# Patient Record
Sex: FEMALE | Race: WHITE | Employment: UNEMPLOYED | URBAN - METROPOLITAN AREA
[De-identification: names, ages, dates, MRNs, and addresses within clinical notes are randomized per-mention and may not be internally consistent; named-entity substitution may affect disease eponyms.]

---

## 2017-01-03 RX ORDER — TRAMADOL HYDROCHLORIDE 50 MG/1
TABLET ORAL
Qty: 240 TABLET | Refills: 0 | Status: SHIPPED | OUTPATIENT
Start: 2017-01-03 | End: 2017-01-29 | Stop reason: SDUPTHER

## 2017-02-09 DIAGNOSIS — N39.41 URGE INCONTINENCE: ICD-10-CM

## 2017-02-10 RX ORDER — OXYBUTYNIN CHLORIDE 10 MG/1
TABLET, EXTENDED RELEASE ORAL
Qty: 30 TABLET | Refills: 11 | Status: SHIPPED | OUTPATIENT
Start: 2017-02-10 | End: 2017-06-13 | Stop reason: SDUPTHER

## 2017-03-13 ENCOUNTER — OFFICE VISIT (OUTPATIENT)
Dept: FAMILY MEDICINE CLINIC | Age: 57
End: 2017-03-13
Payer: MEDICARE

## 2017-03-13 VITALS
SYSTOLIC BLOOD PRESSURE: 130 MMHG | HEART RATE: 89 BPM | BODY MASS INDEX: 43.98 KG/M2 | DIASTOLIC BLOOD PRESSURE: 74 MMHG | WEIGHT: 285 LBS

## 2017-03-13 DIAGNOSIS — M15.9 PRIMARY OSTEOARTHRITIS INVOLVING MULTIPLE JOINTS: ICD-10-CM

## 2017-03-13 DIAGNOSIS — Z12.11 COLON CANCER SCREENING: ICD-10-CM

## 2017-03-13 DIAGNOSIS — Z23 IMMUNIZATION DUE: ICD-10-CM

## 2017-03-13 DIAGNOSIS — I10 ESSENTIAL HYPERTENSION: Primary | ICD-10-CM

## 2017-03-13 PROCEDURE — 90732 PPSV23 VACC 2 YRS+ SUBQ/IM: CPT | Performed by: FAMILY MEDICINE

## 2017-03-13 PROCEDURE — 99213 OFFICE O/P EST LOW 20 MIN: CPT | Performed by: FAMILY MEDICINE

## 2017-03-13 PROCEDURE — 90471 IMMUNIZATION ADMIN: CPT | Performed by: FAMILY MEDICINE

## 2017-03-13 RX ORDER — FLUTICASONE PROPIONATE 50 MCG
2 SPRAY, SUSPENSION (ML) NASAL DAILY
Qty: 1 BOTTLE | Refills: 5 | Status: SHIPPED | OUTPATIENT
Start: 2017-03-13 | End: 2017-06-13 | Stop reason: SDUPTHER

## 2017-03-13 RX ORDER — ESCITALOPRAM OXALATE 20 MG/1
20 TABLET ORAL DAILY
Qty: 30 TABLET | Refills: 5 | Status: SHIPPED | OUTPATIENT
Start: 2017-03-13 | End: 2017-06-13 | Stop reason: SDUPTHER

## 2017-03-13 ASSESSMENT — ENCOUNTER SYMPTOMS
BACK PAIN: 1
SORE THROAT: 0
SINUS PRESSURE: 0
SHORTNESS OF BREATH: 0
DIARRHEA: 0
NAUSEA: 0
VOMITING: 0
COUGH: 0

## 2017-04-04 RX ORDER — LISINOPRIL 20 MG/1
TABLET ORAL
Qty: 30 TABLET | Refills: 4 | Status: SHIPPED | OUTPATIENT
Start: 2017-04-04 | End: 2017-06-13 | Stop reason: SDUPTHER

## 2017-04-24 RX ORDER — TRAMADOL HYDROCHLORIDE 50 MG/1
TABLET ORAL
Qty: 240 TABLET | Refills: 1 | Status: SHIPPED | OUTPATIENT
Start: 2017-04-24 | End: 2017-06-21 | Stop reason: SDUPTHER

## 2017-06-09 ENCOUNTER — TELEPHONE (OUTPATIENT)
Dept: FAMILY MEDICINE CLINIC | Age: 57
End: 2017-06-09

## 2017-06-13 ENCOUNTER — OFFICE VISIT (OUTPATIENT)
Dept: FAMILY MEDICINE CLINIC | Age: 57
End: 2017-06-13
Payer: MEDICARE

## 2017-06-13 VITALS
BODY MASS INDEX: 45.36 KG/M2 | DIASTOLIC BLOOD PRESSURE: 80 MMHG | HEIGHT: 67 IN | SYSTOLIC BLOOD PRESSURE: 120 MMHG | WEIGHT: 289 LBS | HEART RATE: 96 BPM

## 2017-06-13 DIAGNOSIS — I10 ESSENTIAL HYPERTENSION: Primary | ICD-10-CM

## 2017-06-13 DIAGNOSIS — F32.A DEPRESSION, UNSPECIFIED DEPRESSION TYPE: ICD-10-CM

## 2017-06-13 DIAGNOSIS — N39.41 URGE INCONTINENCE: ICD-10-CM

## 2017-06-13 DIAGNOSIS — M15.9 PRIMARY OSTEOARTHRITIS INVOLVING MULTIPLE JOINTS: ICD-10-CM

## 2017-06-13 PROCEDURE — 99213 OFFICE O/P EST LOW 20 MIN: CPT | Performed by: FAMILY MEDICINE

## 2017-06-13 RX ORDER — METOPROLOL TARTRATE 50 MG/1
50 TABLET, FILM COATED ORAL 2 TIMES DAILY
Qty: 60 TABLET | Refills: 5 | Status: SHIPPED | OUTPATIENT
Start: 2017-06-13 | End: 2017-10-13 | Stop reason: SDUPTHER

## 2017-06-13 RX ORDER — BUPROPION HYDROCHLORIDE 150 MG/1
150 TABLET, EXTENDED RELEASE ORAL 2 TIMES DAILY
Qty: 60 TABLET | Refills: 3 | Status: SHIPPED | OUTPATIENT
Start: 2017-06-13 | End: 2018-04-02 | Stop reason: ALTCHOICE

## 2017-06-13 RX ORDER — TRAMADOL HYDROCHLORIDE 50 MG/1
TABLET ORAL
Qty: 240 TABLET | Refills: 1 | Status: CANCELLED | OUTPATIENT
Start: 2017-06-13

## 2017-06-13 RX ORDER — ESCITALOPRAM OXALATE 20 MG/1
20 TABLET ORAL DAILY
Qty: 30 TABLET | Refills: 5 | Status: SHIPPED | OUTPATIENT
Start: 2017-06-13 | End: 2018-04-02 | Stop reason: ALTCHOICE

## 2017-06-13 RX ORDER — OXYBUTYNIN CHLORIDE 10 MG/1
TABLET, EXTENDED RELEASE ORAL
Qty: 30 TABLET | Refills: 5 | Status: SHIPPED | OUTPATIENT
Start: 2017-06-13 | End: 2018-04-02 | Stop reason: ALTCHOICE

## 2017-06-13 RX ORDER — LISINOPRIL 20 MG/1
TABLET ORAL
Qty: 30 TABLET | Refills: 5 | Status: SHIPPED | OUTPATIENT
Start: 2017-06-13 | End: 2017-10-13 | Stop reason: SDUPTHER

## 2017-06-13 RX ORDER — RANITIDINE 150 MG/1
150 TABLET ORAL 2 TIMES DAILY
Qty: 60 TABLET | Refills: 5 | Status: SHIPPED | OUTPATIENT
Start: 2017-06-13 | End: 2019-11-08

## 2017-06-13 RX ORDER — FLUTICASONE PROPIONATE 50 MCG
2 SPRAY, SUSPENSION (ML) NASAL DAILY
Qty: 1 BOTTLE | Refills: 5 | Status: SHIPPED | OUTPATIENT
Start: 2017-06-13 | End: 2018-05-22 | Stop reason: SDUPTHER

## 2017-06-13 ASSESSMENT — PATIENT HEALTH QUESTIONNAIRE - PHQ9
9. THOUGHTS THAT YOU WOULD BE BETTER OFF DEAD, OR OF HURTING YOURSELF: 2
2. FEELING DOWN, DEPRESSED OR HOPELESS: 3
4. FEELING TIRED OR HAVING LITTLE ENERGY: 3
7. TROUBLE CONCENTRATING ON THINGS, SUCH AS READING THE NEWSPAPER OR WATCHING TELEVISION: 3
SUM OF ALL RESPONSES TO PHQ9 QUESTIONS 1 & 2: 6
1. LITTLE INTEREST OR PLEASURE IN DOING THINGS: 3
SUM OF ALL RESPONSES TO PHQ QUESTIONS 1-9: 26
8. MOVING OR SPEAKING SO SLOWLY THAT OTHER PEOPLE COULD HAVE NOTICED. OR THE OPPOSITE, BEING SO FIGETY OR RESTLESS THAT YOU HAVE BEEN MOVING AROUND A LOT MORE THAN USUAL: 3
3. TROUBLE FALLING OR STAYING ASLEEP: 3
5. POOR APPETITE OR OVEREATING: 3
6. FEELING BAD ABOUT YOURSELF - OR THAT YOU ARE A FAILURE OR HAVE LET YOURSELF OR YOUR FAMILY DOWN: 3

## 2017-06-13 ASSESSMENT — ENCOUNTER SYMPTOMS
BACK PAIN: 1
DIARRHEA: 0
VOMITING: 0
SHORTNESS OF BREATH: 0
NAUSEA: 0
SINUS PRESSURE: 0
SORE THROAT: 0
COUGH: 0

## 2017-06-19 ENCOUNTER — TELEPHONE (OUTPATIENT)
Dept: FAMILY MEDICINE CLINIC | Age: 57
End: 2017-06-19

## 2017-06-19 RX ORDER — CEPHALEXIN 500 MG/1
500 CAPSULE ORAL 3 TIMES DAILY
Qty: 30 CAPSULE | Refills: 0 | Status: SHIPPED | OUTPATIENT
Start: 2017-06-19 | End: 2017-07-12 | Stop reason: SDUPTHER

## 2017-07-12 ENCOUNTER — TELEPHONE (OUTPATIENT)
Dept: FAMILY MEDICINE CLINIC | Age: 57
End: 2017-07-12

## 2017-07-12 RX ORDER — CEPHALEXIN 500 MG/1
500 CAPSULE ORAL 3 TIMES DAILY
Qty: 30 CAPSULE | Refills: 0 | Status: SHIPPED | OUTPATIENT
Start: 2017-07-12 | End: 2017-08-18 | Stop reason: SDUPTHER

## 2017-07-19 RX ORDER — TRAMADOL HYDROCHLORIDE 50 MG/1
TABLET ORAL
Qty: 240 TABLET | Refills: 0 | Status: SHIPPED | OUTPATIENT
Start: 2017-07-19 | End: 2017-08-17 | Stop reason: SDUPTHER

## 2017-07-20 ENCOUNTER — INITIAL CONSULT (OUTPATIENT)
Dept: FAMILY MEDICINE CLINIC | Age: 57
End: 2017-07-20
Payer: MEDICARE

## 2017-07-20 DIAGNOSIS — F33.2 MAJOR DEPRESSIVE DISORDER, RECURRENT EPISODE, SEVERE WITH MIXED FEATURES (HCC): Primary | ICD-10-CM

## 2017-07-20 PROCEDURE — 90791 PSYCH DIAGNOSTIC EVALUATION: CPT | Performed by: SOCIAL WORKER

## 2017-07-20 ASSESSMENT — PATIENT HEALTH QUESTIONNAIRE - PHQ9
7. TROUBLE CONCENTRATING ON THINGS, SUCH AS READING THE NEWSPAPER OR WATCHING TELEVISION: 3
9. THOUGHTS THAT YOU WOULD BE BETTER OFF DEAD, OR OF HURTING YOURSELF: 1
2. FEELING DOWN, DEPRESSED OR HOPELESS: 3
6. FEELING BAD ABOUT YOURSELF - OR THAT YOU ARE A FAILURE OR HAVE LET YOURSELF OR YOUR FAMILY DOWN: 3
3. TROUBLE FALLING OR STAYING ASLEEP: 3
10. IF YOU CHECKED OFF ANY PROBLEMS, HOW DIFFICULT HAVE THESE PROBLEMS MADE IT FOR YOU TO DO YOUR WORK, TAKE CARE OF THINGS AT HOME, OR GET ALONG WITH OTHER PEOPLE: 2
5. POOR APPETITE OR OVEREATING: 3
8. MOVING OR SPEAKING SO SLOWLY THAT OTHER PEOPLE COULD HAVE NOTICED. OR THE OPPOSITE, BEING SO FIGETY OR RESTLESS THAT YOU HAVE BEEN MOVING AROUND A LOT MORE THAN USUAL: 0
SUM OF ALL RESPONSES TO PHQ9 QUESTIONS 1 & 2: 4
SUM OF ALL RESPONSES TO PHQ QUESTIONS 1-9: 20
4. FEELING TIRED OR HAVING LITTLE ENERGY: 3
1. LITTLE INTEREST OR PLEASURE IN DOING THINGS: 1

## 2017-07-28 ENCOUNTER — INITIAL CONSULT (OUTPATIENT)
Dept: FAMILY MEDICINE CLINIC | Age: 57
End: 2017-07-28
Payer: MEDICARE

## 2017-07-28 DIAGNOSIS — F33.1 DEPRESSION, MAJOR, RECURRENT, MODERATE (HCC): Primary | ICD-10-CM

## 2017-07-28 PROCEDURE — 90834 PSYTX W PT 45 MINUTES: CPT | Performed by: SOCIAL WORKER

## 2017-08-17 RX ORDER — TRAMADOL HYDROCHLORIDE 50 MG/1
TABLET ORAL
Qty: 240 TABLET | Refills: 0 | Status: SHIPPED | OUTPATIENT
Start: 2017-08-17 | End: 2017-09-15 | Stop reason: SDUPTHER

## 2017-08-18 ENCOUNTER — TELEPHONE (OUTPATIENT)
Dept: FAMILY MEDICINE CLINIC | Age: 57
End: 2017-08-18

## 2017-08-18 ENCOUNTER — INITIAL CONSULT (OUTPATIENT)
Dept: FAMILY MEDICINE CLINIC | Age: 57
End: 2017-08-18
Payer: MEDICARE

## 2017-08-18 DIAGNOSIS — F33.1 DEPRESSION, MAJOR, RECURRENT, MODERATE (HCC): Primary | ICD-10-CM

## 2017-08-18 PROCEDURE — 90834 PSYTX W PT 45 MINUTES: CPT | Performed by: SOCIAL WORKER

## 2017-08-18 RX ORDER — CEPHALEXIN 500 MG/1
500 CAPSULE ORAL 3 TIMES DAILY
Qty: 30 CAPSULE | Refills: 0 | Status: SHIPPED | OUTPATIENT
Start: 2017-08-18 | End: 2017-08-30 | Stop reason: SDUPTHER

## 2017-08-31 RX ORDER — CEPHALEXIN 500 MG/1
CAPSULE ORAL
Qty: 30 CAPSULE | Refills: 0 | Status: SHIPPED | OUTPATIENT
Start: 2017-08-31 | End: 2017-10-13 | Stop reason: SDUPTHER

## 2017-09-21 ENCOUNTER — TELEPHONE (OUTPATIENT)
Dept: FAMILY MEDICINE CLINIC | Age: 57
End: 2017-09-21

## 2017-09-21 RX ORDER — CEPHALEXIN 500 MG/1
500 CAPSULE ORAL 3 TIMES DAILY
Qty: 30 CAPSULE | Refills: 0 | Status: SHIPPED | OUTPATIENT
Start: 2017-09-21 | End: 2017-10-13 | Stop reason: ALTCHOICE

## 2017-09-21 NOTE — TELEPHONE ENCOUNTER
Patient had to cancel her appt today because she is not feeling well at all. She wants to know if you will send an rx to the pharmacy for keflex, or whatever you gave her before.  She will call to reschedule next week. cherri nova

## 2017-10-11 RX ORDER — TRAMADOL HYDROCHLORIDE 50 MG/1
TABLET ORAL
Qty: 240 TABLET | Refills: 0 | Status: SHIPPED | OUTPATIENT
Start: 2017-10-11 | End: 2017-10-13 | Stop reason: SDUPTHER

## 2017-10-11 NOTE — TELEPHONE ENCOUNTER
Last refill 9-   Health Maintenance   Topic Date Due    Hepatitis C screen  1960    HIV screen  08/27/1975    DTaP/Tdap/Td vaccine (1 - Tdap) 08/27/1979    Diabetes screen  08/27/2000    Colon cancer screen colonoscopy  02/08/2017    Flu vaccine (1) 12/23/2017 (Originally 9/1/2017)    Breast cancer screen  01/07/2018    Lipid screen  12/07/2021    Pneumococcal med risk  Completed       No results found for: LABA1C          ( goal A1C is < 7)   No results found for: LABMICR  LDL Calculated (mg/dL)   Date Value   12/07/2016 79       (goal LDL is <100)   AST (IU/L)   Date Value   12/07/2016 26     ALT (IU/L)   Date Value   12/07/2016 21     BUN (mg/dl)   Date Value   12/07/2016 11     BP Readings from Last 3 Encounters:   06/13/17 120/80   03/13/17 130/74   12/23/16 128/82          (goal 120/80)    All Future Testing planned in [facility]  Lab Frequency Next Occurrence   CBC Auto Differential Once 01/17/2017       Next Visit Date:  Future Appointments  Date Time Provider Lane Gipson   10/13/2017 2:50 PM Shantell Villalobos MD [facility]            Patient Active Problem List:     S/P REID-BSO     Osteoarthritis     Hypertension     Chronic headaches     Lumbar stenosis with neurogenic claudication     Morbid obesity due to excess calories (HCC)     Colon polyps     Major depressive disorder, recurrent episode, severe with mixed features ([address]

## 2017-10-13 ENCOUNTER — OFFICE VISIT (OUTPATIENT)
Dept: FAMILY MEDICINE CLINIC | Age: 57
End: 2017-10-13
Payer: MEDICARE

## 2017-10-13 VITALS
DIASTOLIC BLOOD PRESSURE: 80 MMHG | HEART RATE: 96 BPM | SYSTOLIC BLOOD PRESSURE: 130 MMHG | BODY MASS INDEX: 46.05 KG/M2 | WEIGHT: 293 LBS

## 2017-10-13 DIAGNOSIS — I10 ESSENTIAL HYPERTENSION: ICD-10-CM

## 2017-10-13 DIAGNOSIS — L97.919 ULCERS OF BOTH LOWER LEGS (HCC): ICD-10-CM

## 2017-10-13 DIAGNOSIS — R73.01 IMPAIRED FASTING GLUCOSE: Primary | ICD-10-CM

## 2017-10-13 DIAGNOSIS — L97.929 ULCERS OF BOTH LOWER LEGS (HCC): ICD-10-CM

## 2017-10-13 DIAGNOSIS — Z23 IMMUNIZATION DUE: ICD-10-CM

## 2017-10-13 PROCEDURE — 90471 IMMUNIZATION ADMIN: CPT | Performed by: FAMILY MEDICINE

## 2017-10-13 PROCEDURE — 99213 OFFICE O/P EST LOW 20 MIN: CPT | Performed by: FAMILY MEDICINE

## 2017-10-13 PROCEDURE — 90688 IIV4 VACCINE SPLT 0.5 ML IM: CPT | Performed by: FAMILY MEDICINE

## 2017-10-13 RX ORDER — METOPROLOL TARTRATE 50 MG/1
50 TABLET, FILM COATED ORAL 2 TIMES DAILY
Qty: 60 TABLET | Refills: 5 | Status: SHIPPED | OUTPATIENT
Start: 2017-10-13 | End: 2018-10-14 | Stop reason: SDUPTHER

## 2017-10-13 RX ORDER — LISINOPRIL 20 MG/1
TABLET ORAL
Qty: 30 TABLET | Refills: 5 | Status: SHIPPED | OUTPATIENT
Start: 2017-10-13 | End: 2018-05-24 | Stop reason: ALTCHOICE

## 2017-10-13 RX ORDER — CEFDINIR 300 MG/1
300 CAPSULE ORAL 2 TIMES DAILY
Qty: 20 CAPSULE | Refills: 0 | Status: SHIPPED | OUTPATIENT
Start: 2017-10-13 | End: 2017-11-01 | Stop reason: SDUPTHER

## 2017-10-13 RX ORDER — TRAMADOL HYDROCHLORIDE 50 MG/1
TABLET ORAL
Qty: 240 TABLET | Refills: 0 | Status: SHIPPED | OUTPATIENT
Start: 2017-10-13 | End: 2018-01-24 | Stop reason: SDUPTHER

## 2017-10-13 ASSESSMENT — ENCOUNTER SYMPTOMS
DIARRHEA: 0
VOMITING: 0
SINUS PRESSURE: 0
BACK PAIN: 1
COUGH: 0
NAUSEA: 0
SHORTNESS OF BREATH: 0
SORE THROAT: 0

## 2017-10-13 NOTE — LETTER
MEDICATION AGREEMENT     Lowell Horton  [birth year]      For certain conditions, multiple classes of medications may be used to help better manage your symptoms, and to improve your ability to function at home, work and in social settings. However, these medications do have risks, which will be discussed with you, including addiction and dependency. The following prescribed medications need frequent monitoring and will require you to partner and assist in your healthcare. Medication  Dose, instructions and quantity as indicated on current prescription bottle Diagnosis/Reason(s) for Taking Category     Tramadol                             Benefits and goals of Controlled Substance Medications: There are two potential goals for your treatment: (1) decreased pain and suffering (2) improved daily life functions. There are many possible treatments for your chronic condition(s), and, in addition to controlled substance medications, we will try alternatives such as physical therapy, yoga, massage, home daily exercise, meditation, relaxation techniques, injections, chiropractic manipulations, surgery, cognitive therapy, hypnosis and many medications that are not habit-forming. Use of controlled substance medications may be helpful, but they are unlikely to resolve all of your symptoms or restore all function. Risks of Controlled Substance Medications:    Opioid pain medications: These medications can lead to problems such as addiction/dependence, sedation, lightheadedness/dizziness, memory issues, falls, constipation, nausea, or vomiting. They may also impair the ability to drive or operate machinery. Additionally, these medications may lower testosterone levels, leading to loss of bone strength, stamina and sex drive.   They may cause problems with breathing, sleep apnea and reduced coughing, which are especially dangerous for patients with lung disease. Overdose or dangerous interactions with alcohol and other medications may occur, leading to death. Hyperalgesia may develop, in which patients receiving opioids for the treatment of pain may actually become more sensitive to certain painful stimuli, and in some cases, experience pain from ordinarily non-painful stimuli. Women between the ages of 14-53 who could become pregnant should carefully weigh the risks and benefits of opioids with their physicians, as these medications increase the risk of pregnancy complications, including miscarriage,  delivery and stillbirth. It is also possible for babies to be born addicted to opioids. Opioid dependence withdrawal symptoms may include; feelings of uneasiness, increased pain, irritability, belly pain, diarrhea, sweats and goose-flesh. Benzodiazepines and non-benzodiazepine sleep medications: These medications can lead to problems such as addiction/dependence, sedation, fatigue, lightheadedness, dizziness, incoordination, falls, depression, hallucinations, and impaired judgment, memory and concentration. The ability to drive and operate machinery may also be affected. Abnormal sleep-related behaviors have been reported, including sleep walking, driving, making telephone calls, eating, or having sex while not fully awake. These medications can suppress breathing and worsen sleep apnea, particularly when combined with alcohol or other sedating medications, potentially leading to death. Dependence withdrawal symptoms may include tremors, anxiety, hallucinations and seizures. Stimulants:  Common adverse effects include addiction/dependence, increased blood pressure and heart rate, decreased appetite, nausea, involuntary weight loss, insomnia, irritability, and headaches.   These risks may increase when these medications are combined with other stimulants, such as caffeine pills or energy drinks, certain weight loss supplements and oral decongestants. Dependence withdrawal symptoms may include depressed mood, loss of interest, suicidal thoughts, anxiety, fatigue, appetite changes and agitation. Testosterone replacement therapy:  Potential side effects include increased risk of stroke and heart attack, blood clots, increased blood pressure, increased cholesterol, enlarged prostate, sleep apnea, irritability/aggression and other mood disorders, and decreased fertility. Other:     1. I understand that I have the following responsibilities:  · I will take medications at the dose and frequency prescribed. · I will not increase or change how I take my medications without the approval of the health care provider who signs this Medication Agreement. · I will arrange for refills at the prescribed interval ONLY during regular office hours. I will not ask for refills earlier than agreed, after-hours, on holidays or on weekends. · I will obtain all refills for these medications at  ·  ____________________________________  pharmacy (phone number  ·  ________________________), with full consent for my provider and pharmacist to exchange information in writing or verbally. · I will not request any pain medications or controlled substances from other providers and will inform this provider of all other medications I am taking. · I will inform my other health care providers that I am taking these medications and of the existence of this [id number]. In the event of an emergency, I will provide the same information to the emergency department providers. · I will protect my prescriptions and medications. I understand that lost or misplaced prescriptions will not be replaced. · I will keep medications only for my own use and will not share them with others. I will keep all medications away from children. · I agree to participate in any medical, psychological or psychiatric assessments recommended by my provider.

## 2017-10-13 NOTE — LETTER
MEDICATION AGREEMENT     Nilton Alves  [birth year]      For certain conditions, multiple classes of medications may be used to help better manage your symptoms, and to improve your ability to function at home, work and in social settings. However, these medications do have risks, which will be discussed with you, including addiction and dependency. The following prescribed medications need frequent monitoring and will require you to partner and assist in your healthcare. Medication  Dose, instructions and quantity as indicated on current prescription bottle Diagnosis/Reason(s) for Taking Category                                  Benefits and goals of Controlled Substance Medications: There are two potential goals for your treatment: (1) decreased pain and suffering (2) improved daily life functions. There are many possible treatments for your chronic condition(s), and, in addition to controlled substance medications, we will try alternatives such as physical therapy, yoga, massage, home daily exercise, meditation, relaxation techniques, injections, chiropractic manipulations, surgery, cognitive therapy, hypnosis and many medications that are not habit-forming. Use of controlled substance medications may be helpful, but they are unlikely to resolve all of your symptoms or restore all function. Risks of Controlled Substance Medications:    Opioid pain medications: These medications can lead to problems such as addiction/dependence, sedation, lightheadedness/dizziness, memory issues, falls, constipation, nausea, or vomiting. They may also impair the ability to drive or operate machinery. Additionally, these medications may lower testosterone levels, leading to loss of bone strength, stamina and sex drive.   They may cause problems with breathing, sleep apnea and reduced coughing, which are especially dangerous for patients with lung

## 2017-10-13 NOTE — PROGRESS NOTES
Gardenia Hayes is a 62 y.o. female who presents today for her medical conditions/complaints as noted below. Gardenia Hayes is c/o of Hypertension; Health Maintenance (colon, adacel); and Medication Refill      HPI:     HYPERTENSION visit     BP Readings from Last 3 Encounters:   10/13/17 130/80   06/13/17 120/80   03/13/17 130/74       LDL Calculated (mg/dL)   Date Value   12/07/2016 79     HDL (mg/dl)   Date Value   12/07/2016 40     BUN (mg/dl)   Date Value   12/07/2016 11     CREATININE (mg/dl)   Date Value   12/07/2016 0.9     Glucose (mg/dl)   Date Value   12/07/2016 104 (H)              Have you changed or started any medications since your last visit including any over-the-counter medicines, vitamins, or herbal medicines? no   Have you stopped taking any of your medications? Is so, why? -  no  Are you having any side effects from any of your medications? - no    Have you seen any other physician or provider since your last visit?  no   Have you had any other diagnostic tests since your last visit?  no   Have you been seen in the emergency room and/or had an admission in a hospital since we last saw you?  no   Have you had your routine dental cleaning in the past 6 months?  yes -      Do you have an active MyChart account? If no, what is the barrier? No:     Patient Care Team:  Kings Torres MD as PCP - General (Family Medicine)    Medical History Review  Past Medical, Family, and Social History reviewed and  contribute to the patient presenting condition    Health Maintenance   Topic Date Due    Hepatitis C screen  1960    HIV screen  08/27/1975    DTaP/Tdap/Td vaccine (1 - Tdap) 08/27/1979    Diabetes screen  08/27/2000    Colon cancer screen colonoscopy  02/08/2017    Flu vaccine (1) 12/23/2017 (Originally 9/1/2017)    Breast cancer screen  01/07/2018    Lipid screen  12/07/2021    Pneumococcal med risk  Completed         No past medical history on file.    Past Surgical History:

## 2017-10-13 NOTE — LETTER
[facility]  [address]  [address] [place] [address]  Phone: [phone]  Fax: [phone]    Maru Mendez MD        October 20, 2017     [address]  [facility] [address]      Dear Yoel Herron:    Your test results are essentially normal.     Below are the results from your recent visit:    Resulted Orders   Lipid Panel   Result Value Ref Range    Cholesterol 147 <200 mg/dl    Triglycerides 137 <150 mg/dl    HDL 44 40 - 60 mg/dl      Comment:         HDL <40 mg/dL IS A RISK FACTOR FOR CORONARY HEART DISEASE. HDL >60 mg/dL IS A NEGATIVE RISK FACTOR FOR CORONARY HEART DISEASE.          LDL Calculated 76 <130 mg/dL      Comment:         LDL CHOLESTEROL REFERENCE RANGE FOR 0-19 YEARS:        DESIRABLE <110 mg/dL, BORDERLINE 110-129, HIGH RISK >130     LDL CHOLESTEROL REFERENCE RANGE FOR ADULTS:        DESIRABLE <100 mg/dL, BORDERLINE 130-159, HIGH RISK >160       **REFERENCE RANGES REVISED 6/10/16 ACCORDING TO NCEP GUIDELINES**      VLDL CHOLESTEROL CALCULATED 27 <30 mg/dL    LDl/HDL Ratio 1.7 <3.5    Chol/HDL Ratio 3.3 <5    Narrative    Ordering Provider: Susanna iVllegas   Comprehensive Metabolic Panel   Result Value Ref Range    Glucose 143 (H) 70 - 100 mg/dl      Comment:            DIAGNOSTIC THRESHOLDS FOR DIABETES AND IMPAIRED FASTING GLUCOSE (IFG)                                        FASTING PLASMA GLUCOSE                NORMAL                       <100     MG/DL                IFG                          100-125  MG/DL                DIABETES                     >/= 126  MG/DL      BUN 16 10 - 20 mg/dl    CREATININE 1.1 0.5 - 1.3 mg/dl    eGFR African American 62 >60    EGFR IF NonAfrican American 51 (L) >60      Comment:               * ESTIMATED GFR (eGFR) IS CALCULATED FROM SERUM CREATININE AND        OTHER VARIABLES AFFECTING ITS VALUE (AGE, RACE, AND SEX). * FOR PATIENT WITH ESTABLISHED CHRONIC KIDNEY DISEASE, THE CHART        BELOW DEFINES STAGES WITH eGFR VALUES.                       Stage 1     90 mL/min/1.73 m2 or greater                  Stage 2     60-89 mL/min/1.73 m2                   Stage 3     30-59 mL/min/1.73 m2                  Stage 4     15-29 mL/min/1.73 m2                  Stage 5     14 mL/min/1.73 m2 or less      Calcium 9.5 8.7 - 10.8 mg/dl    Sodium 149 (H) 134 - 147 mmol/L    Potassium 5.3 3.5 - 5.4 mmol/L    Chloride 107 95 - 111 mmol/L    CO2 23 21 - 32 mmol/L    Anion Gap 24     Total Bilirubin 0.5 0.2 - 1.3 mg/dl    Alk Phosphatase 104 35 - 121 IU/L    AST 18 10 - 42 IU/L    ALT 22 5 - 59 IU/L    Total Protein 6.5 5.8 - 8.0 g/dl    Alb 3.8 3.2 - 5.3 g/dl    Narrative    Ordering Provider: Hermelinda Vaca   CBC   Result Value Ref Range    WBC 10.4 3.7 - 10.8 K/ul    RBC 5.29 4.00 - 5.50 M/ul    Hemoglobin 15.2 12.0 - 16.0 g/dL    Hematocrit 45.3 36.0 - 48.0 %    MCV 85.6 80 - 100 fl    MCH 28.7 27.0 - 34.0 pg    MCHC 33.6 31.0 - 36.0 g/dl    RDW 12.8 10.8 - 14.8 %    Platelets 725 095 - 373 K/ul    Absolute Neut # 6.1 1.3 - 9.1 K/ul    Neutrophils % 58.3 %    Absolute Lymph # 3.1 0.8 - 5.2 K/ul    Lymphocyte % 30.0 %    Absolute Mono # 0.7 0.1 - 0.9 K/ul    Monocytes 7.1 %    Absolute Eos # 0.3 0.1 - 0.4 K/ul    Eosinophils % 3.2 %    Absolute Baso # 0.1 0.0 - 0.1 K/ul    Basophils % 0.9 %      Comment:      MANUAL DIFFERENTIAL PERFORMED         Narrative    Ordering Provider: Hermelinda Vaca   Hemoglobin A1C   Result Value Ref Range    Hemoglobin A1C 5.7 4.2 - 5.8 %    AVERAGE GLUCOSE 117 (H) 66 - 114 mg/dL      Comment:                 HEMOGLOBIN A1c      DEGREE OF GLUCOSE CONTROL        ----------------    ----------------------------            <5.7%            Decreased risk of diabetes          5.7 - 6.4%         Increased risk of diabetes             >6.4%            Consistent with diagnosis of diabetes Pathology [address] [facility] [address], [address]  : [place] [address] [id number]   [facility] Accreditation No. [id number]      Narrative    Ordering Provider: Nelly Cruz     The test results show that your current treatment is working. Please continue your current medication and plan. If you have any questions or concerns, please don't hesitate to call.     Sincerely,        Michael Brewer MD   TW

## 2017-10-17 LAB
ABSOLUTE BASO #: 0.1 K/UL (ref 0–0.1)
ABSOLUTE EOS #: 0.3 K/UL (ref 0.1–0.4)
ABSOLUTE LYMPH #: 3.1 K/UL (ref 0.8–5.2)
ABSOLUTE MONO #: 0.7 K/UL (ref 0.1–0.9)
ABSOLUTE NEUT #: 6.1 K/UL (ref 1.3–9.1)
ALBUMIN SERPL-MCNC: 3.8 G/DL (ref 3.2–5.3)
ALK PHOSPHATASE: 104 IU/L (ref 35–121)
ALT SERPL-CCNC: 22 IU/L (ref 5–59)
ANION GAP SERPL CALCULATED.3IONS-SCNC: 24 MMOL/L
AST SERPL-CCNC: 18 IU/L (ref 10–42)
AVERAGE GLUCOSE: 117 MG/DL (ref 66–114)
BASOPHILS RELATIVE PERCENT: 0.9 %
BILIRUB SERPL-MCNC: 0.5 MG/DL (ref 0.2–1.3)
BUN BLDV-MCNC: 16 MG/DL (ref 10–20)
CALCIUM SERPL-MCNC: 9.5 MG/DL (ref 8.7–10.8)
CHLORIDE BLD-SCNC: 107 MMOL/L (ref 95–111)
CHOLESTEROL/HDL RATIO: 3.3
CHOLESTEROL: 147 MG/DL
CO2: 23 MMOL/L (ref 21–32)
CREAT SERPL-MCNC: 1.1 MG/DL (ref 0.5–1.3)
EGFR AFRICAN AMERICAN: 62
EGFR IF NONAFRICAN AMERICAN: 51
EOSINOPHILS RELATIVE PERCENT: 3.2 %
GLUCOSE: 143 MG/DL (ref 70–100)
HBA1C MFR BLD: 5.7 % (ref 4.2–5.8)
HCT VFR BLD CALC: 45.3 % (ref 36–48)
HDLC SERPL-MCNC: 44 MG/DL (ref 40–60)
HEMOGLOBIN: 15.2 G/DL (ref 12–16)
LDL CHOLESTEROL CALCULATED: 76 MG/DL
LDL/HDL RATIO: 1.7
LYMPHOCYTE %: 30 %
MCH RBC QN AUTO: 28.7 PG (ref 27–34)
MCHC RBC AUTO-ENTMCNC: 33.6 G/DL (ref 31–36)
MCV RBC AUTO: 85.6 FL (ref 80–100)
MONOCYTES # BLD: 7.1 %
NEUTROPHILS RELATIVE PERCENT: 58.3 %
PDW BLD-RTO: 12.8 % (ref 10.8–14.8)
PLATELETS: 208 K/UL (ref 150–450)
POTASSIUM SERPL-SCNC: 5.3 MMOL/L (ref 3.5–5.4)
RBC: 5.29 M/UL (ref 4–5.5)
SODIUM BLD-SCNC: 149 MMOL/L (ref 134–147)
TOTAL PROTEIN: 6.5 G/DL (ref 5.8–8)
TRIGL SERPL-MCNC: 137 MG/DL
VLDLC SERPL CALC-MCNC: 27 MG/DL
WBC: 10.4 K/UL (ref 3.7–10.8)

## 2017-11-01 DIAGNOSIS — L97.919 ULCERS OF BOTH LOWER LEGS (HCC): ICD-10-CM

## 2017-11-01 DIAGNOSIS — L97.929 ULCERS OF BOTH LOWER LEGS (HCC): ICD-10-CM

## 2017-11-02 RX ORDER — CEFDINIR 300 MG/1
CAPSULE ORAL
Qty: 20 CAPSULE | Refills: 0 | Status: SHIPPED | OUTPATIENT
Start: 2017-11-02 | End: 2017-12-07 | Stop reason: SDUPTHER

## 2017-11-13 RX ORDER — TRAMADOL HYDROCHLORIDE 50 MG/1
TABLET ORAL
Qty: 240 TABLET | Refills: 0 | Status: SHIPPED | OUTPATIENT
Start: 2017-11-13 | End: 2017-12-07 | Stop reason: SDUPTHER

## 2017-12-07 DIAGNOSIS — L97.929 ULCERS OF BOTH LOWER LEGS (HCC): ICD-10-CM

## 2017-12-07 DIAGNOSIS — L97.919 ULCERS OF BOTH LOWER LEGS (HCC): ICD-10-CM

## 2017-12-07 RX ORDER — CEFDINIR 300 MG/1
CAPSULE ORAL
Qty: 20 CAPSULE | Refills: 0 | Status: SHIPPED | OUTPATIENT
Start: 2017-12-07 | End: 2018-01-03 | Stop reason: SDUPTHER

## 2017-12-07 RX ORDER — TRAMADOL HYDROCHLORIDE 50 MG/1
TABLET ORAL
Qty: 240 TABLET | Refills: 0 | Status: SHIPPED | OUTPATIENT
Start: 2017-12-07 | End: 2018-01-03 | Stop reason: SDUPTHER

## 2018-01-03 DIAGNOSIS — L97.929 ULCERS OF BOTH LOWER LEGS (HCC): ICD-10-CM

## 2018-01-03 DIAGNOSIS — L97.919 ULCERS OF BOTH LOWER LEGS (HCC): ICD-10-CM

## 2018-01-03 DIAGNOSIS — M48.062 LUMBAR STENOSIS WITH NEUROGENIC CLAUDICATION: Primary | ICD-10-CM

## 2018-01-03 RX ORDER — TRAMADOL HYDROCHLORIDE 50 MG/1
TABLET ORAL
Qty: 240 TABLET | Refills: 0 | Status: SHIPPED | OUTPATIENT
Start: 2018-01-03 | End: 2018-02-06 | Stop reason: SDUPTHER

## 2018-01-03 RX ORDER — CEFDINIR 300 MG/1
CAPSULE ORAL
Qty: 20 CAPSULE | Refills: 0 | Status: SHIPPED | OUTPATIENT
Start: 2018-01-03 | End: 2018-01-24 | Stop reason: SDUPTHER

## 2018-01-03 NOTE — TELEPHONE ENCOUNTER
Last refill 12-   Health Maintenance   Topic Date Due    Potassium monitoring  1960    Creatinine monitoring  1960    Hepatitis C screen  1960    HIV screen  08/27/1975    DTaP/Tdap/Td vaccine (1 - Tdap) 08/27/1979    Colon cancer screen colonoscopy  02/08/2017    Breast cancer screen  01/07/2018    A1C test (Diabetic or Prediabetic)  10/16/2018    Diabetes screen  10/16/2020    Lipid screen  10/16/2022    Flu vaccine  Completed    Pneumococcal med risk  Completed       Hemoglobin A1C (%)   Date Value   10/16/2017 5.7             ( goal A1C is < 7)   No results found for: LABMICR  LDL Calculated (mg/dL)   Date Value   10/16/2017 76       (goal LDL is <100)   AST (IU/L)   Date Value   10/16/2017 18     ALT (IU/L)   Date Value   10/16/2017 22     BUN (mg/dl)   Date Value   10/16/2017 16     BP Readings from Last 3 Encounters:   10/13/17 130/80   06/13/17 120/80   03/13/17 130/74          (goal 120/80)    All Future Testing planned in [facility]  Lab Frequency Next Occurrence   CBC Auto Differential Once 01/11/2018       Next Visit Date:  Future Appointments  Date Time Provider Lane [address]   1/15/2018 2:00 PM MD XU [facility]re[facility]            Patient Active Problem List:     S/P REID-BSO     Osteoarthritis     Hypertension     Chronic headaches     Lumbar stenosis with neurogenic claudication     Morbid obesity due to excess calories (HCC)     Colon polyps     Major depressive disorder, recurrent episode, severe with mixed features ([address]

## 2018-01-09 ENCOUNTER — TELEPHONE (OUTPATIENT)
Dept: FAMILY MEDICINE CLINIC | Age: 58
End: 2018-01-09

## 2018-01-10 ENCOUNTER — TELEPHONE (OUTPATIENT)
Dept: FAMILY MEDICINE CLINIC | Age: 58
End: 2018-01-10

## 2018-01-12 ENCOUNTER — TELEPHONE (OUTPATIENT)
Dept: FAMILY MEDICINE CLINIC | Age: 58
End: 2018-01-12

## 2018-01-24 ENCOUNTER — OFFICE VISIT (OUTPATIENT)
Dept: FAMILY MEDICINE CLINIC | Age: 58
End: 2018-01-24
Payer: MEDICARE

## 2018-01-24 VITALS
BODY MASS INDEX: 45.99 KG/M2 | HEART RATE: 86 BPM | WEIGHT: 293 LBS | HEIGHT: 67 IN | DIASTOLIC BLOOD PRESSURE: 84 MMHG | SYSTOLIC BLOOD PRESSURE: 136 MMHG

## 2018-01-24 DIAGNOSIS — M48.062 LUMBAR STENOSIS WITH NEUROGENIC CLAUDICATION: ICD-10-CM

## 2018-01-24 DIAGNOSIS — E66.01 MORBID OBESITY DUE TO EXCESS CALORIES (HCC): ICD-10-CM

## 2018-01-24 DIAGNOSIS — M15.9 PRIMARY OSTEOARTHRITIS INVOLVING MULTIPLE JOINTS: Primary | ICD-10-CM

## 2018-01-24 DIAGNOSIS — L97.929 ULCERS OF BOTH LOWER LEGS (HCC): ICD-10-CM

## 2018-01-24 DIAGNOSIS — I10 ESSENTIAL HYPERTENSION: ICD-10-CM

## 2018-01-24 DIAGNOSIS — L97.919 ULCERS OF BOTH LOWER LEGS (HCC): ICD-10-CM

## 2018-01-24 PROCEDURE — G8427 DOCREV CUR MEDS BY ELIG CLIN: HCPCS | Performed by: FAMILY MEDICINE

## 2018-01-24 PROCEDURE — 3017F COLORECTAL CA SCREEN DOC REV: CPT | Performed by: FAMILY MEDICINE

## 2018-01-24 PROCEDURE — G8484 FLU IMMUNIZE NO ADMIN: HCPCS | Performed by: FAMILY MEDICINE

## 2018-01-24 PROCEDURE — 3014F SCREEN MAMMO DOC REV: CPT | Performed by: FAMILY MEDICINE

## 2018-01-24 PROCEDURE — 4004F PT TOBACCO SCREEN RCVD TLK: CPT | Performed by: FAMILY MEDICINE

## 2018-01-24 PROCEDURE — 99213 OFFICE O/P EST LOW 20 MIN: CPT | Performed by: FAMILY MEDICINE

## 2018-01-24 PROCEDURE — G8417 CALC BMI ABV UP PARAM F/U: HCPCS | Performed by: FAMILY MEDICINE

## 2018-01-24 RX ORDER — CEFDINIR 300 MG/1
CAPSULE ORAL
Qty: 20 CAPSULE | Refills: 0 | Status: SHIPPED | OUTPATIENT
Start: 2018-01-24 | End: 2018-02-06 | Stop reason: SDUPTHER

## 2018-01-24 ASSESSMENT — ENCOUNTER SYMPTOMS
COUGH: 0
SHORTNESS OF BREATH: 0
VOMITING: 0
DIARRHEA: 0
BACK PAIN: 1
SINUS PRESSURE: 0
SORE THROAT: 0
NAUSEA: 0

## 2018-02-02 ENCOUNTER — TELEPHONE (OUTPATIENT)
Dept: FAMILY MEDICINE CLINIC | Age: 58
End: 2018-02-02

## 2018-02-06 DIAGNOSIS — L97.929 ULCERS OF BOTH LOWER LEGS (HCC): ICD-10-CM

## 2018-02-06 DIAGNOSIS — L97.919 ULCERS OF BOTH LOWER LEGS (HCC): ICD-10-CM

## 2018-02-06 DIAGNOSIS — M48.062 LUMBAR STENOSIS WITH NEUROGENIC CLAUDICATION: ICD-10-CM

## 2018-02-06 RX ORDER — TRAMADOL HYDROCHLORIDE 50 MG/1
TABLET ORAL
Qty: 170 TABLET | Refills: 0 | Status: SHIPPED | OUTPATIENT
Start: 2018-02-06 | End: 2018-02-26 | Stop reason: SDUPTHER

## 2018-02-06 RX ORDER — CEFDINIR 300 MG/1
CAPSULE ORAL
Qty: 20 CAPSULE | Refills: 0 | Status: SHIPPED | OUTPATIENT
Start: 2018-02-06 | End: 2018-04-02 | Stop reason: ALTCHOICE

## 2018-02-08 ENCOUNTER — TELEPHONE (OUTPATIENT)
Dept: FAMILY MEDICINE CLINIC | Age: 58
End: 2018-02-08

## 2018-02-08 NOTE — TELEPHONE ENCOUNTER
Patient is requesting that you call her at [phone]. I asked if I could help her but she said she has to go through you.

## 2018-02-26 RX ORDER — CEPHALEXIN 500 MG/1
CAPSULE ORAL
Qty: 30 CAPSULE | Refills: 0 | Status: SHIPPED | OUTPATIENT
Start: 2018-02-26 | End: 2018-02-27 | Stop reason: ALTCHOICE

## 2018-02-26 NOTE — TELEPHONE ENCOUNTER
Health Maintenance   Topic Date Due    Hepatitis C screen  1960    HIV screen  08/27/1975    DTaP/Tdap/Td vaccine (1 - Tdap) 08/27/1979    Shingles Vaccine (1 of 2 - 2 Dose Series) 08/27/2010    Colon cancer screen colonoscopy  02/08/2017    Breast cancer screen  01/07/2018    A1C test (Diabetic or Prediabetic)  10/16/2018    Potassium monitoring  10/16/2018    Creatinine monitoring  10/16/2018    Lipid screen  10/16/2022    Flu vaccine  Completed    Pneumococcal med risk  Completed       Hemoglobin A1C (%)   Date Value   10/16/2017 5.7             ( goal A1C is < 7)   No results found for: LABMICR  LDL Calculated (mg/dL)   Date Value   10/16/2017 76       (goal LDL is <100)   AST (IU/L)   Date Value   10/16/2017 18     ALT (IU/L)   Date Value   10/16/2017 22     BUN (mg/dl)   Date Value   10/16/2017 16     BP Readings from Last 3 Encounters:   01/24/18 136/84   10/13/17 130/80   06/13/17 120/80          (goal 120/80)    All Future Testing planned in [facility]  Lab Frequency Next Occurrence   CBC Auto Differential Once 01/11/2018       Next Visit Date:  Future Appointments  Date Time Provider Lane Gipson   2/27/2018 1:50 PM MD XU [facility]ai [facility]   4/24/2018 2:20 PM MD XU Desai            Patient Active Problem List:     S/P REID-BSO     Osteoarthritis     Hypertension     Chronic headaches     Lumbar stenosis with neurogenic claudication     Morbid obesity due to excess calories (HCC)     Colon polyps     Major depressive disorder, recurrent episode, severe with mixed features ([address]

## 2018-02-27 ENCOUNTER — OFFICE VISIT (OUTPATIENT)
Dept: FAMILY MEDICINE CLINIC | Age: 58
End: 2018-02-27
Payer: MEDICARE

## 2018-02-27 VITALS
BODY MASS INDEX: 47.14 KG/M2 | SYSTOLIC BLOOD PRESSURE: 150 MMHG | HEART RATE: 97 BPM | DIASTOLIC BLOOD PRESSURE: 90 MMHG | WEIGHT: 293 LBS

## 2018-02-27 DIAGNOSIS — I10 ESSENTIAL HYPERTENSION: ICD-10-CM

## 2018-02-27 DIAGNOSIS — M15.9 PRIMARY OSTEOARTHRITIS INVOLVING MULTIPLE JOINTS: Primary | ICD-10-CM

## 2018-02-27 DIAGNOSIS — E66.01 MORBID OBESITY DUE TO EXCESS CALORIES (HCC): ICD-10-CM

## 2018-02-27 DIAGNOSIS — M48.062 LUMBAR STENOSIS WITH NEUROGENIC CLAUDICATION: ICD-10-CM

## 2018-02-27 PROCEDURE — G8484 FLU IMMUNIZE NO ADMIN: HCPCS | Performed by: FAMILY MEDICINE

## 2018-02-27 PROCEDURE — G8417 CALC BMI ABV UP PARAM F/U: HCPCS | Performed by: FAMILY MEDICINE

## 2018-02-27 PROCEDURE — G8427 DOCREV CUR MEDS BY ELIG CLIN: HCPCS | Performed by: FAMILY MEDICINE

## 2018-02-27 PROCEDURE — 3014F SCREEN MAMMO DOC REV: CPT | Performed by: FAMILY MEDICINE

## 2018-02-27 PROCEDURE — 99213 OFFICE O/P EST LOW 20 MIN: CPT | Performed by: FAMILY MEDICINE

## 2018-02-27 PROCEDURE — 4004F PT TOBACCO SCREEN RCVD TLK: CPT | Performed by: FAMILY MEDICINE

## 2018-02-27 PROCEDURE — 3017F COLORECTAL CA SCREEN DOC REV: CPT | Performed by: FAMILY MEDICINE

## 2018-02-27 RX ORDER — TRAMADOL HYDROCHLORIDE 50 MG/1
TABLET ORAL
Qty: 240 TABLET | Refills: 0 | Status: SHIPPED | OUTPATIENT
Start: 2018-02-27 | End: 2018-04-02 | Stop reason: SDUPTHER

## 2018-02-27 ASSESSMENT — ENCOUNTER SYMPTOMS
SHORTNESS OF BREATH: 0
COUGH: 0
VOMITING: 0
NAUSEA: 0
BACK PAIN: 0
SINUS PRESSURE: 0
DIARRHEA: 0
SORE THROAT: 0

## 2018-02-27 NOTE — PROGRESS NOTES
Wilmar Jensen is a 62 y.o. female who presents today for her medical conditions/complaints as noted below. Wilmar Jensen is c/o of Hypertension and Health Maintenance (macy, colon)  Routine follow up on PL having a lot of knee pain right more so than left. She is asking for tramadol Rx early as time for approval and then need is such to satisfy insurance needs. She wants to see an Ortho Dr for her knee pain. HPI:     Visit Information    Have you changed or started any medications since your last visit including any over-the-counter medicines, vitamins, or herbal medicines? no   Are you having any side effects from any of your medications? -  no  Have you stopped taking any of your medications? Is so, why? -  no    Have you seen any other physician or provider since your last visit? No  Have you had any other diagnostic tests since your last visit? No  Have you been seen in the emergency room and/or had an admission to a hospital since we last saw you? No  Have you had your routine dental cleaning in the past 6 months? yes -     Have you activated your [employer] account? If not, what are your barriers? No:      Patient Care Team:  Modesto Santana MD as PCP - General (Family Medicine)    Medical History Review  Past Medical, Family, and Social History reviewed and  contribute to the patient presenting condition    Health Maintenance   Topic Date Due    Hepatitis C screen  1960    HIV screen  08/27/1975    DTaP/Tdap/Td vaccine (1 - Tdap) 08/27/1979    Shingles Vaccine (1 of 2 - 2 Dose Series) 08/27/2010    Colon cancer screen colonoscopy  02/08/2017    Breast cancer screen  01/07/2018    A1C test (Diabetic or Prediabetic)  10/16/2018    Potassium monitoring  10/16/2018    Creatinine monitoring  10/16/2018    Lipid screen  10/16/2022    Flu vaccine  Completed    Pneumococcal med risk  Completed       No past medical history on file.    Past Surgical History:   Procedure Laterality Date Specific Question:   Reason for exam:     Answer:   knee pain    XR KNEE RIGHT (3 VIEWS)     Standing Status:   Future     Standing Expiration Date:   2/27/2019     Order Specific Question:   Reason for exam:     Answer:   pain   [id number], Karen Washburn DO, [facility]     Referral Priority:   Routine     Referral Type:   Consult for Advice and Opinion     Referral Reason:   Specialty Services Required     Referred to Provider:   Connor Dugan DO     Requested Specialty:   Orthopedic Surgery     Number of Visits Requested:   1     Orders Placed This Encounter   Medications    traMADol (ULTRAM) 50 MG tablet     Sig: One to two pills every six hours as needed for pain. Dispense:  240 tablet     Refill:  0     Discussed weight loss and the use of low CHO diet and suggested readings were provided to her that might benefit her.

## 2018-03-02 ENCOUNTER — TELEPHONE (OUTPATIENT)
Dept: FAMILY MEDICINE CLINIC | Age: 58
End: 2018-03-02

## 2018-03-02 NOTE — TELEPHONE ENCOUNTER
Mily Templeton is telling her that they havent received the prior auth back from the insurance co, and we have to call the insurance co. Today so she can get her Ultram.

## 2018-03-05 ENCOUNTER — TELEPHONE (OUTPATIENT)
Dept: FAMILY MEDICINE CLINIC | Age: 58
End: 2018-03-05

## 2018-03-13 ENCOUNTER — TELEPHONE (OUTPATIENT)
Dept: FAMILY MEDICINE CLINIC | Age: 58
End: 2018-03-13

## 2018-03-13 DIAGNOSIS — M15.9 PRIMARY OSTEOARTHRITIS INVOLVING MULTIPLE JOINTS: ICD-10-CM

## 2018-03-14 ENCOUNTER — TELEPHONE (OUTPATIENT)
Dept: FAMILY MEDICINE CLINIC | Age: 58
End: 2018-03-14

## 2018-03-16 RX ORDER — CEPHALEXIN 500 MG/1
CAPSULE ORAL
Qty: 30 CAPSULE | Refills: 0 | Status: SHIPPED | OUTPATIENT
Start: 2018-03-16 | End: 2018-04-02 | Stop reason: SDUPTHER

## 2018-03-16 RX ORDER — FLUTICASONE PROPIONATE 50 MCG
SPRAY, SUSPENSION (ML) NASAL
Qty: 1 BOTTLE | Refills: 4 | Status: SHIPPED | OUTPATIENT
Start: 2018-03-16 | End: 2019-02-28 | Stop reason: SDUPTHER

## 2018-03-16 NOTE — TELEPHONE ENCOUNTER
Health Maintenance   Topic Date Due    Hepatitis C screen  1960    HIV screen  08/27/1975    DTaP/Tdap/Td vaccine (1 - Tdap) 08/27/1979    Shingles Vaccine (1 of 2 - 2 Dose Series) 08/27/2010    Colon cancer screen colonoscopy  02/08/2017    Breast cancer screen  01/07/2018    A1C test (Diabetic or Prediabetic)  10/16/2018    Potassium monitoring  10/16/2018    Creatinine monitoring  10/16/2018    Lipid screen  10/16/2022    Flu vaccine  Completed    Pneumococcal med risk  Completed       Hemoglobin A1C (%)   Date Value   10/16/2017 5.7             ( goal A1C is < 7)   No results found for: LABMICR  LDL Calculated (mg/dL)   Date Value   10/16/2017 76       (goal LDL is <100)   AST (IU/L)   Date Value   10/16/2017 18     ALT (IU/L)   Date Value   10/16/2017 22     BUN (mg/dl)   Date Value   10/16/2017 16     BP Readings from Last 3 Encounters:   02/27/18 (!) 150/90   01/24/18 136/84   10/13/17 130/80          (goal 120/80)    All Future Testing planned in [facility]  Lab Frequency Next Occurrence   CBC Auto Differential Once 01/11/2018   XR KNEE LEFT (3 VIEWS) Once 03/06/2018       Next Visit Date:  Future Appointments  Date Time Provider Lane Gipson   3/26/2018 11:00 AM SCHEDULE, [facility] [address]   4/24/2018 2:20 PM Modesto Santana MD [facility]            Patient Active Problem List:     S/P REID-BSO     Osteoarthritis     Hypertension     Chronic headaches     Lumbar stenosis with neurogenic claudication     Morbid obesity due to excess calories (HCC)     Colon polyps     Major depressive disorder, recurrent episode, severe with mixed features ([address]

## 2018-04-02 ENCOUNTER — OFFICE VISIT (OUTPATIENT)
Dept: ORTHOPEDIC SURGERY | Age: 58
End: 2018-04-02
Payer: MEDICARE

## 2018-04-02 VITALS — BODY MASS INDEX: 45.99 KG/M2 | HEIGHT: 67 IN | WEIGHT: 293 LBS

## 2018-04-02 DIAGNOSIS — M15.9 PRIMARY OSTEOARTHRITIS INVOLVING MULTIPLE JOINTS: ICD-10-CM

## 2018-04-02 DIAGNOSIS — M48.062 LUMBAR STENOSIS WITH NEUROGENIC CLAUDICATION: ICD-10-CM

## 2018-04-02 DIAGNOSIS — M17.0 BILATERAL PRIMARY OSTEOARTHRITIS OF KNEE: Primary | ICD-10-CM

## 2018-04-02 PROCEDURE — 4004F PT TOBACCO SCREEN RCVD TLK: CPT | Performed by: STUDENT IN AN ORGANIZED HEALTH CARE EDUCATION/TRAINING PROGRAM

## 2018-04-02 PROCEDURE — G8417 CALC BMI ABV UP PARAM F/U: HCPCS | Performed by: STUDENT IN AN ORGANIZED HEALTH CARE EDUCATION/TRAINING PROGRAM

## 2018-04-02 PROCEDURE — 3014F SCREEN MAMMO DOC REV: CPT | Performed by: STUDENT IN AN ORGANIZED HEALTH CARE EDUCATION/TRAINING PROGRAM

## 2018-04-02 PROCEDURE — 20610 DRAIN/INJ JOINT/BURSA W/O US: CPT | Performed by: STUDENT IN AN ORGANIZED HEALTH CARE EDUCATION/TRAINING PROGRAM

## 2018-04-02 PROCEDURE — 99203 OFFICE O/P NEW LOW 30 MIN: CPT | Performed by: STUDENT IN AN ORGANIZED HEALTH CARE EDUCATION/TRAINING PROGRAM

## 2018-04-02 PROCEDURE — G8427 DOCREV CUR MEDS BY ELIG CLIN: HCPCS | Performed by: STUDENT IN AN ORGANIZED HEALTH CARE EDUCATION/TRAINING PROGRAM

## 2018-04-02 PROCEDURE — 3017F COLORECTAL CA SCREEN DOC REV: CPT | Performed by: STUDENT IN AN ORGANIZED HEALTH CARE EDUCATION/TRAINING PROGRAM

## 2018-04-02 RX ORDER — CEPHALEXIN 500 MG/1
CAPSULE ORAL
Qty: 30 CAPSULE | Refills: 0 | Status: SHIPPED | OUTPATIENT
Start: 2018-04-02 | End: 2018-04-25 | Stop reason: SDUPTHER

## 2018-04-02 RX ORDER — METHYLPREDNISOLONE ACETATE 80 MG/ML
80 INJECTION, SUSPENSION INTRA-ARTICULAR; INTRALESIONAL; INTRAMUSCULAR; SOFT TISSUE ONCE
Status: COMPLETED | OUTPATIENT
Start: 2018-04-02 | End: 2018-04-03

## 2018-04-02 RX ORDER — ACETAMINOPHEN, ASPIRIN AND CAFFEINE 250; 250; 65 MG/1; MG/1; MG/1
2 TABLET, FILM COATED ORAL 2 TIMES DAILY
COMMUNITY

## 2018-04-02 RX ORDER — TRAMADOL HYDROCHLORIDE 50 MG/1
TABLET ORAL
Qty: 120 TABLET | Refills: 0 | Status: SHIPPED | OUTPATIENT
Start: 2018-04-02 | End: 2018-04-25 | Stop reason: SDUPTHER

## 2018-04-02 RX ORDER — BUPIVACAINE HYDROCHLORIDE 2.5 MG/ML
2 INJECTION, SOLUTION INFILTRATION; PERINEURAL ONCE
Status: COMPLETED | OUTPATIENT
Start: 2018-04-02 | End: 2018-04-03

## 2018-04-02 ASSESSMENT — ENCOUNTER SYMPTOMS
ABDOMINAL PAIN: 0
COUGH: 0
SHORTNESS OF BREATH: 0
COLOR CHANGE: 0

## 2018-04-03 RX ADMIN — METHYLPREDNISOLONE ACETATE 80 MG: 80 INJECTION, SUSPENSION INTRA-ARTICULAR; INTRALESIONAL; INTRAMUSCULAR; SOFT TISSUE at 11:28

## 2018-04-03 RX ADMIN — BUPIVACAINE HYDROCHLORIDE 5 MG: 2.5 INJECTION, SOLUTION INFILTRATION; PERINEURAL at 11:28

## 2018-05-22 DIAGNOSIS — M48.062 LUMBAR STENOSIS WITH NEUROGENIC CLAUDICATION: ICD-10-CM

## 2018-05-22 DIAGNOSIS — M15.9 PRIMARY OSTEOARTHRITIS INVOLVING MULTIPLE JOINTS: ICD-10-CM

## 2018-05-22 RX ORDER — TRAMADOL HYDROCHLORIDE 50 MG/1
TABLET ORAL
Qty: 120 TABLET | Refills: 0 | Status: SHIPPED | OUTPATIENT
Start: 2018-05-22 | End: 2018-06-19 | Stop reason: SDUPTHER

## 2018-05-22 RX ORDER — FLUTICASONE PROPIONATE 50 MCG
2 SPRAY, SUSPENSION (ML) NASAL DAILY
Qty: 1 BOTTLE | Refills: 5 | Status: SHIPPED | OUTPATIENT
Start: 2018-05-22 | End: 2018-05-24 | Stop reason: SDUPTHER

## 2018-05-22 RX ORDER — CEPHALEXIN 500 MG/1
CAPSULE ORAL
Qty: 30 CAPSULE | Refills: 0 | Status: SHIPPED | OUTPATIENT
Start: 2018-05-22 | End: 2018-09-13 | Stop reason: ALTCHOICE

## 2018-05-24 ENCOUNTER — OFFICE VISIT (OUTPATIENT)
Dept: FAMILY MEDICINE CLINIC | Age: 58
End: 2018-05-24
Payer: MEDICARE

## 2018-05-24 VITALS
BODY MASS INDEX: 46.83 KG/M2 | WEIGHT: 293 LBS | SYSTOLIC BLOOD PRESSURE: 144 MMHG | HEART RATE: 104 BPM | DIASTOLIC BLOOD PRESSURE: 90 MMHG

## 2018-05-24 DIAGNOSIS — I10 ESSENTIAL HYPERTENSION: ICD-10-CM

## 2018-05-24 DIAGNOSIS — M48.062 LUMBAR STENOSIS WITH NEUROGENIC CLAUDICATION: ICD-10-CM

## 2018-05-24 DIAGNOSIS — M79.7 FIBROMYALGIA: ICD-10-CM

## 2018-05-24 DIAGNOSIS — F33.2 MAJOR DEPRESSIVE DISORDER, RECURRENT EPISODE, SEVERE WITH MIXED FEATURES (HCC): ICD-10-CM

## 2018-05-24 DIAGNOSIS — M15.9 PRIMARY OSTEOARTHRITIS INVOLVING MULTIPLE JOINTS: Primary | ICD-10-CM

## 2018-05-24 PROCEDURE — 3017F COLORECTAL CA SCREEN DOC REV: CPT | Performed by: FAMILY MEDICINE

## 2018-05-24 PROCEDURE — 99213 OFFICE O/P EST LOW 20 MIN: CPT | Performed by: FAMILY MEDICINE

## 2018-05-24 PROCEDURE — G8417 CALC BMI ABV UP PARAM F/U: HCPCS | Performed by: FAMILY MEDICINE

## 2018-05-24 PROCEDURE — 4004F PT TOBACCO SCREEN RCVD TLK: CPT | Performed by: FAMILY MEDICINE

## 2018-05-24 PROCEDURE — G8427 DOCREV CUR MEDS BY ELIG CLIN: HCPCS | Performed by: FAMILY MEDICINE

## 2018-05-24 RX ORDER — LISINOPRIL AND HYDROCHLOROTHIAZIDE 20; 12.5 MG/1; MG/1
1 TABLET ORAL DAILY
Qty: 30 TABLET | Refills: 5 | Status: SHIPPED | OUTPATIENT
Start: 2018-05-24 | End: 2018-09-13 | Stop reason: SDUPTHER

## 2018-05-24 ASSESSMENT — ENCOUNTER SYMPTOMS
SHORTNESS OF BREATH: 0
VOMITING: 0
NAUSEA: 0
SINUS PRESSURE: 0
BACK PAIN: 1
SORE THROAT: 0
DIARRHEA: 0
COUGH: 0

## 2018-07-17 ENCOUNTER — TELEPHONE (OUTPATIENT)
Dept: FAMILY MEDICINE CLINIC | Age: 58
End: 2018-07-17

## 2018-07-17 DIAGNOSIS — M48.062 LUMBAR STENOSIS WITH NEUROGENIC CLAUDICATION: ICD-10-CM

## 2018-07-17 DIAGNOSIS — M15.9 PRIMARY OSTEOARTHRITIS INVOLVING MULTIPLE JOINTS: ICD-10-CM

## 2018-07-17 RX ORDER — TRAMADOL HYDROCHLORIDE 50 MG/1
TABLET ORAL
Qty: 120 TABLET | Refills: 0 | Status: SHIPPED | OUTPATIENT
Start: 2018-07-17 | End: 2018-08-20 | Stop reason: SDUPTHER

## 2018-07-17 NOTE — TELEPHONE ENCOUNTER
PATIENT STATES SHE WAS GIVEN ANTIBIOTIC FOR SORES ON LEGS THE ANTIBIOTIC HELPED CLEAR LEFT LEG UP BUT RIGHT LEG SORES  ARE STILL THERE AND VERY ITCHY AND SHE THINKS MORE ANTIBIOTIC WILL HELP MEDICATION WAS KEFLEX 500 MG 1 TID,CAN THIS BE CALLED IN OR DOES SHE NEED APPT .  PLEASE ADVISE OFFICE STAFF POOL    Health Maintenance   Topic Date Due    Hepatitis C screen  1960    HIV screen  08/27/1975    DTaP/Tdap/Td vaccine (1 - Tdap) 08/27/1979    Shingles Vaccine (1 of 2 - 2 Dose Series) 08/27/2010    Colon cancer screen colonoscopy  02/08/2017    Breast cancer screen  01/07/2018    Flu vaccine (1) 09/01/2018    A1C test (Diabetic or Prediabetic)  10/16/2018    Potassium monitoring  10/16/2018    Creatinine monitoring  10/16/2018    Lipid screen  10/16/2022    Pneumococcal med risk  Completed             (applicable per patient's age: Cancer Screenings, Depression Screening, Fall Risk Screening, Immunizations)    Hemoglobin A1C (%)   Date Value   10/16/2017 5.7     LDL Calculated (mg/dL)   Date Value   10/16/2017 76     AST (IU/L)   Date Value   10/16/2017 18     ALT (IU/L)   Date Value   10/16/2017 22     BUN (mg/dl)   Date Value   10/16/2017 16      (goal A1C is < 7)   (goal LDL is <100) need 30-50% reduction from baseline     BP Readings from Last 3 Encounters:   05/24/18 (!) 144/90   02/27/18 (!) 150/90   01/24/18 136/84    (goal /80)      All Future Testing planned in [facility]:  Lab Frequency Next Occurrence   CBC Auto Differential Once 01/11/2018   XR KNEE LEFT (3 VIEWS) Once 03/06/2018       Next Visit Date:  Future Appointments  Date Time Provider Lane [place]   8/28/2018 2:00 PM MD XU Nuñez            Patient Active Problem List:     S/P REID-BSO     Osteoarthritis     Hypertension     Chronic headaches     Lumbar stenosis with neurogenic claudication     Morbid obesity due to excess calories (HCC)     Colon polyps     Major depressive disorder, recurrent episode, severe with mixed features (HCC)     Fibromyalgia

## 2018-08-20 DIAGNOSIS — M15.9 PRIMARY OSTEOARTHRITIS INVOLVING MULTIPLE JOINTS: ICD-10-CM

## 2018-08-20 DIAGNOSIS — M48.062 LUMBAR STENOSIS WITH NEUROGENIC CLAUDICATION: ICD-10-CM

## 2018-08-20 RX ORDER — TRAMADOL HYDROCHLORIDE 50 MG/1
TABLET ORAL
Qty: 120 TABLET | Refills: 0 | Status: SHIPPED | OUTPATIENT
Start: 2018-08-20 | End: 2018-09-19

## 2018-09-13 ENCOUNTER — OFFICE VISIT (OUTPATIENT)
Dept: FAMILY MEDICINE CLINIC | Age: 58
End: 2018-09-13
Payer: MEDICARE

## 2018-09-13 VITALS
HEART RATE: 84 BPM | WEIGHT: 293 LBS | SYSTOLIC BLOOD PRESSURE: 130 MMHG | DIASTOLIC BLOOD PRESSURE: 80 MMHG | BODY MASS INDEX: 46.36 KG/M2

## 2018-09-13 DIAGNOSIS — M48.062 LUMBAR STENOSIS WITH NEUROGENIC CLAUDICATION: ICD-10-CM

## 2018-09-13 DIAGNOSIS — R73.01 IMPAIRED FASTING GLUCOSE: ICD-10-CM

## 2018-09-13 DIAGNOSIS — R07.89 CHEST WALL PAIN: ICD-10-CM

## 2018-09-13 DIAGNOSIS — L97.911 ULCER OF RIGHT LOWER EXTREMITY, LIMITED TO BREAKDOWN OF SKIN (HCC): ICD-10-CM

## 2018-09-13 DIAGNOSIS — Z23 IMMUNIZATION DUE: ICD-10-CM

## 2018-09-13 DIAGNOSIS — M15.9 PRIMARY OSTEOARTHRITIS INVOLVING MULTIPLE JOINTS: ICD-10-CM

## 2018-09-13 DIAGNOSIS — I10 ESSENTIAL HYPERTENSION: Primary | ICD-10-CM

## 2018-09-13 PROCEDURE — 99213 OFFICE O/P EST LOW 20 MIN: CPT | Performed by: FAMILY MEDICINE

## 2018-09-13 PROCEDURE — 3017F COLORECTAL CA SCREEN DOC REV: CPT | Performed by: FAMILY MEDICINE

## 2018-09-13 PROCEDURE — G8417 CALC BMI ABV UP PARAM F/U: HCPCS | Performed by: FAMILY MEDICINE

## 2018-09-13 PROCEDURE — 90471 IMMUNIZATION ADMIN: CPT | Performed by: FAMILY MEDICINE

## 2018-09-13 PROCEDURE — 90688 IIV4 VACCINE SPLT 0.5 ML IM: CPT | Performed by: FAMILY MEDICINE

## 2018-09-13 PROCEDURE — G8427 DOCREV CUR MEDS BY ELIG CLIN: HCPCS | Performed by: FAMILY MEDICINE

## 2018-09-13 PROCEDURE — 4004F PT TOBACCO SCREEN RCVD TLK: CPT | Performed by: FAMILY MEDICINE

## 2018-09-13 RX ORDER — TRAMADOL HYDROCHLORIDE 50 MG/1
TABLET ORAL
Qty: 120 TABLET | Refills: 0 | Status: CANCELLED | OUTPATIENT
Start: 2018-09-13 | End: 2018-10-13

## 2018-09-13 RX ORDER — LISINOPRIL AND HYDROCHLOROTHIAZIDE 20; 12.5 MG/1; MG/1
1 TABLET ORAL DAILY
Qty: 30 TABLET | Refills: 5 | Status: SHIPPED | OUTPATIENT
Start: 2018-09-13 | End: 2021-11-10 | Stop reason: SDUPTHER

## 2018-09-13 RX ORDER — TRAMADOL HYDROCHLORIDE 50 MG/1
50 TABLET ORAL EVERY 6 HOURS PRN
Qty: 120 TABLET | Refills: 0 | Status: SHIPPED | OUTPATIENT
Start: 2018-09-13 | End: 2018-10-11 | Stop reason: SDUPTHER

## 2018-09-13 ASSESSMENT — ENCOUNTER SYMPTOMS
SORE THROAT: 0
NAUSEA: 0
SINUS PRESSURE: 0
COUGH: 0
SHORTNESS OF BREATH: 0
VOMITING: 0
DIARRHEA: 0
BACK PAIN: 0

## 2018-09-13 NOTE — PROGRESS NOTES
Ysabel Fagan is a 62 y.o. female who presents today for her medical conditions/complaints as noted below. Ysabel Fagan is c/o of Back Pain (follow up); Joint Pain; and Wound Dehiscence (on legs)    Routine follow up on PL she is under stress with sisters cancer illness. HPI:     Visit Information    Have you changed or started any medications since your last visit including any over-the-counter medicines, vitamins, or herbal medicines? no   Are you having any side effects from any of your medications? -  no  Have you stopped taking any of your medications? Is so, why? -  no    Have you seen any other physician or provider since your last visit? No  Have you had any other diagnostic tests since your last visit? No  Have you been seen in the emergency room and/or had an admission to a hospital since we last saw you? No  Have you had your routine dental cleaning in the past 6 months? no    Have you activated your [employer] account? If not, what are your barriers? No:      Patient Care Team:  Del Tao MD as PCP - General (Family Medicine)  Del Tao MD as PCP - S Attributed Provider    Medical History Review  Past Medical, Family, and Social History reviewed and  contribute to the patient presenting condition    Health Maintenance   Topic Date Due    Hepatitis C screen  1960    HIV screen  08/27/1975    DTaP/Tdap/Td vaccine (1 - Tdap) 08/27/1979    Shingles Vaccine (1 of 2 - 2 Dose Series) 08/27/2010    Colon cancer screen colonoscopy  02/08/2017    Breast cancer screen  01/07/2018    Flu vaccine (1) 09/01/2018    A1C test (Diabetic or Prediabetic)  10/16/2018    Potassium monitoring  10/16/2018    Creatinine monitoring  10/16/2018    Lipid screen  10/16/2022    Pneumococcal med risk  Completed       No past medical history on file.    Past Surgical History:   Procedure Laterality Date    COLONOSCOPY  06/2016    poor prep, there appears to be some polyps in the colon mixed with stool, could not be removed     No family history on file. Social History   Substance Use Topics    Smoking status: Current Every Day Smoker     Packs/day: 0.50     Years: 30.00     Types: Cigarettes    Smokeless tobacco: Never Used    Alcohol use No      Current Outpatient Prescriptions   Medication Sig Dispense Refill    lisinopril-hydrochlorothiazide (PRINZIDE;ZESTORETIC) 20-12.5 MG per tablet Take 1 tablet by mouth daily 30 tablet 5    traMADol (ULTRAM) 50 MG tablet Take 1 tablet by mouth every 6 hours as needed for Pain for up to 15 days. . 120 tablet 0    mupirocin (BACTROBAN) 2 % ointment Apply 3 times daily. 15 g 0    traMADol (ULTRAM) 50 MG tablet TAKE ONE TO TWO TABLETS BY MOUTH EVERY 6 HOURS AS NEEDED FOR PAIN 120 tablet 0    aspirin-acetaminophen-caffeine (EXCEDRIN MIGRAINE) 250-250-65 MG per tablet Take 2 tablets by mouth 2 times daily      fluticasone (FLONASE) 50 MCG/ACT nasal spray PLACE TWO SPRAYS IN EACH NOSTRIL ONCE DAILY 1 Bottle 4    metoprolol tartrate (LOPRESSOR) 50 MG tablet Take 1 tablet by mouth 2 times daily 60 tablet 5    ranitidine (ZANTAC 150 MAXIMUM STRENGTH) 150 MG tablet Take 1 tablet by mouth 2 times daily (Patient taking differently: Take 150 mg by mouth 4 times daily ) 60 tablet 5     No current facility-administered medications for this visit. No Known Allergies      Subjective:   Review of Systems   Constitutional: Negative for chills, diaphoresis and fever. HENT: Negative for congestion, sinus pressure and sore throat. Eyes: Negative for visual disturbance. Respiratory: Negative for cough and shortness of breath. Cardiovascular: Negative for chest pain and leg swelling. Gastrointestinal: Negative for diarrhea, nausea and vomiting. Genitourinary: Negative for dysuria, menstrual problem, urgency and vaginal discharge. Musculoskeletal: Negative for arthralgias, back pain and myalgias. Skin: Negative for rash.    Neurological: Negative for

## 2018-10-11 DIAGNOSIS — M48.062 LUMBAR STENOSIS WITH NEUROGENIC CLAUDICATION: ICD-10-CM

## 2018-10-11 DIAGNOSIS — M15.9 PRIMARY OSTEOARTHRITIS INVOLVING MULTIPLE JOINTS: ICD-10-CM

## 2018-10-11 RX ORDER — TRAMADOL HYDROCHLORIDE 50 MG/1
TABLET ORAL
Qty: 120 TABLET | Refills: 0 | Status: SHIPPED | OUTPATIENT
Start: 2018-10-11 | End: 2018-11-09 | Stop reason: SDUPTHER

## 2018-10-14 DIAGNOSIS — I10 ESSENTIAL HYPERTENSION: ICD-10-CM

## 2018-10-14 RX ORDER — METOPROLOL TARTRATE 50 MG/1
TABLET, FILM COATED ORAL
Qty: 60 TABLET | Refills: 4 | Status: SHIPPED | OUTPATIENT
Start: 2018-10-14 | End: 2019-02-28 | Stop reason: SDUPTHER

## 2018-11-09 DIAGNOSIS — M15.9 PRIMARY OSTEOARTHRITIS INVOLVING MULTIPLE JOINTS: ICD-10-CM

## 2018-11-09 DIAGNOSIS — M48.062 LUMBAR STENOSIS WITH NEUROGENIC CLAUDICATION: ICD-10-CM

## 2018-11-09 RX ORDER — TRAMADOL HYDROCHLORIDE 50 MG/1
TABLET ORAL
Qty: 120 TABLET | Refills: 0 | Status: SHIPPED | OUTPATIENT
Start: 2018-11-09 | End: 2018-12-07 | Stop reason: SDUPTHER

## 2018-11-20 ENCOUNTER — TELEPHONE (OUTPATIENT)
Dept: FAMILY MEDICINE CLINIC | Age: 58
End: 2018-11-20

## 2018-11-20 NOTE — TELEPHONE ENCOUNTER
Patient called today to say that she is being discharged from [facility] today and her blood sugar is 437. Message left on phone telling her that dr Hoffman Roles wants to see her here in the office tomorrow.

## 2018-12-03 ENCOUNTER — OFFICE VISIT (OUTPATIENT)
Dept: FAMILY MEDICINE CLINIC | Age: 58
End: 2018-12-03
Payer: MEDICARE

## 2018-12-03 VITALS
BODY MASS INDEX: 46.83 KG/M2 | SYSTOLIC BLOOD PRESSURE: 130 MMHG | DIASTOLIC BLOOD PRESSURE: 80 MMHG | WEIGHT: 293 LBS | HEART RATE: 80 BPM

## 2018-12-03 DIAGNOSIS — M15.9 PRIMARY OSTEOARTHRITIS INVOLVING MULTIPLE JOINTS: ICD-10-CM

## 2018-12-03 DIAGNOSIS — I10 ESSENTIAL HYPERTENSION: ICD-10-CM

## 2018-12-03 DIAGNOSIS — Z79.4 TYPE 2 DIABETES MELLITUS WITHOUT COMPLICATION, WITH LONG-TERM CURRENT USE OF INSULIN (HCC): Primary | ICD-10-CM

## 2018-12-03 DIAGNOSIS — E11.9 TYPE 2 DIABETES MELLITUS WITHOUT COMPLICATION, WITH LONG-TERM CURRENT USE OF INSULIN (HCC): Primary | ICD-10-CM

## 2018-12-03 DIAGNOSIS — M79.7 FIBROMYALGIA: ICD-10-CM

## 2018-12-03 LAB — HBA1C MFR BLD: 9.5 %

## 2018-12-03 PROCEDURE — 2022F DILAT RTA XM EVC RTNOPTHY: CPT | Performed by: FAMILY MEDICINE

## 2018-12-03 PROCEDURE — 83036 HEMOGLOBIN GLYCOSYLATED A1C: CPT | Performed by: FAMILY MEDICINE

## 2018-12-03 PROCEDURE — 3017F COLORECTAL CA SCREEN DOC REV: CPT | Performed by: FAMILY MEDICINE

## 2018-12-03 PROCEDURE — 3046F HEMOGLOBIN A1C LEVEL >9.0%: CPT | Performed by: FAMILY MEDICINE

## 2018-12-03 PROCEDURE — 99213 OFFICE O/P EST LOW 20 MIN: CPT | Performed by: FAMILY MEDICINE

## 2018-12-03 PROCEDURE — G8482 FLU IMMUNIZE ORDER/ADMIN: HCPCS | Performed by: FAMILY MEDICINE

## 2018-12-03 PROCEDURE — 4004F PT TOBACCO SCREEN RCVD TLK: CPT | Performed by: FAMILY MEDICINE

## 2018-12-03 PROCEDURE — 1111F DSCHRG MED/CURRENT MED MERGE: CPT | Performed by: FAMILY MEDICINE

## 2018-12-03 PROCEDURE — G8417 CALC BMI ABV UP PARAM F/U: HCPCS | Performed by: FAMILY MEDICINE

## 2018-12-03 PROCEDURE — G8427 DOCREV CUR MEDS BY ELIG CLIN: HCPCS | Performed by: FAMILY MEDICINE

## 2018-12-03 ASSESSMENT — ENCOUNTER SYMPTOMS
SHORTNESS OF BREATH: 0
NAUSEA: 0
COUGH: 0
SINUS PRESSURE: 0
VOMITING: 0
SORE THROAT: 0
BACK PAIN: 1
DIARRHEA: 0

## 2018-12-07 DIAGNOSIS — M15.9 PRIMARY OSTEOARTHRITIS INVOLVING MULTIPLE JOINTS: ICD-10-CM

## 2018-12-07 DIAGNOSIS — M48.062 LUMBAR STENOSIS WITH NEUROGENIC CLAUDICATION: ICD-10-CM

## 2018-12-07 DIAGNOSIS — L97.911 ULCER OF RIGHT LOWER EXTREMITY, LIMITED TO BREAKDOWN OF SKIN (HCC): ICD-10-CM

## 2018-12-07 RX ORDER — TRAMADOL HYDROCHLORIDE 50 MG/1
TABLET ORAL
Qty: 120 TABLET | Refills: 1 | Status: SHIPPED | OUTPATIENT
Start: 2018-12-07 | End: 2019-02-01 | Stop reason: SDUPTHER

## 2018-12-07 NOTE — TELEPHONE ENCOUNTER
Health Maintenance   Topic Date Due    Hepatitis C screen  1960    Diabetic foot exam  08/27/1970    Diabetic retinal exam  08/27/1970    HIV screen  08/27/1975    Diabetic microalbuminuria test  08/27/1978    DTaP/Tdap/Td vaccine (1 - Tdap) 08/27/1979    Shingles Vaccine (1 of 2 - 2 Dose Series) 08/27/2010    Colon cancer screen colonoscopy  02/08/2017    Breast cancer screen  01/07/2018    Lipid screen  10/16/2018    A1C test (Diabetic or Prediabetic)  03/03/2019    Potassium monitoring  11/17/2019    Creatinine monitoring  11/17/2019    Flu vaccine  Completed    Pneumococcal med risk  Completed       Hemoglobin A1C (%)   Date Value   12/03/2018 9.5   10/16/2017 5.7             ( goal A1C is < 7)   No results found for: LABMICR  LDL Calculated (mg/dL)   Date Value   10/16/2017 76       (goal LDL is <100)   AST (IU/L)   Date Value   11/17/2018 19     ALT (IU/L)   Date Value   11/17/2018 22     BUN (mg/dL)   Date Value   11/17/2018 19     BP Readings from Last 3 Encounters:   12/03/18 130/80   09/13/18 130/80   05/24/18 (!) 144/90          (goal 120/80)    All Future Testing planned in [facility]  Lab Frequency Next Occurrence   XR KNEE LEFT (3 VIEWS) Once 03/06/2018   CBC Auto Differential Once 10/13/2018   Comprehensive Metabolic Panel Once 31/75/7480   Lipid Panel Once 10/13/2018   Hemoglobin A1C Once 10/13/2018   XR CHEST STANDARD (2 VW) Once 09/13/2018   XR HIP RIGHT (2-3 VIEWS) Once 12/17/2018       Next Visit Date:  Future Appointments  Date Time Provider Lane Gipson   3/6/2019 3:45 PM MD XU Stanton            Patient Active Problem List:     S/P REID-BSO     Osteoarthritis     Hypertension     Chronic headaches     Lumbar stenosis with neurogenic claudication     Morbid obesity due to excess calories ([address]     Colon polyps     Major depressive disorder, recurrent episode, severe with mixed features ([address]     Fibromyalgia     Type 2 diabetes mellitus without complication, with long-term current use of insulin ([facility] [address]

## 2019-01-04 DIAGNOSIS — L97.911 ULCER OF RIGHT LOWER EXTREMITY, LIMITED TO BREAKDOWN OF SKIN (HCC): ICD-10-CM

## 2019-01-04 RX ORDER — PEN NEEDLE, DIABETIC 32GX 5/32"
NEEDLE, DISPOSABLE MISCELLANEOUS
COMMUNITY
Start: 2018-12-26 | End: 2019-01-04 | Stop reason: SDUPTHER

## 2019-01-04 RX ORDER — PEN NEEDLE, DIABETIC 32GX 5/32"
100 NEEDLE, DISPOSABLE MISCELLANEOUS 2 TIMES DAILY
Qty: 100 EACH | Refills: 3 | Status: SHIPPED | OUTPATIENT
Start: 2019-01-04 | End: 2019-02-28 | Stop reason: SDUPTHER

## 2019-02-01 DIAGNOSIS — M48.062 LUMBAR STENOSIS WITH NEUROGENIC CLAUDICATION: ICD-10-CM

## 2019-02-01 DIAGNOSIS — M15.9 PRIMARY OSTEOARTHRITIS INVOLVING MULTIPLE JOINTS: ICD-10-CM

## 2019-02-01 RX ORDER — TRAMADOL HYDROCHLORIDE 50 MG/1
TABLET ORAL
Qty: 120 TABLET | Refills: 0 | Status: SHIPPED | OUTPATIENT
Start: 2019-02-01 | End: 2019-02-28 | Stop reason: SDUPTHER

## 2019-02-05 DIAGNOSIS — Z79.4 TYPE 2 DIABETES MELLITUS WITHOUT COMPLICATION, WITH LONG-TERM CURRENT USE OF INSULIN (HCC): Primary | ICD-10-CM

## 2019-02-05 DIAGNOSIS — E11.9 TYPE 2 DIABETES MELLITUS WITHOUT COMPLICATION, WITH LONG-TERM CURRENT USE OF INSULIN (HCC): Primary | ICD-10-CM

## 2019-02-05 DIAGNOSIS — E66.01 MORBID OBESITY DUE TO EXCESS CALORIES (HCC): ICD-10-CM

## 2019-02-05 RX ORDER — GLUCOSAMINE HCL/CHONDROITIN SU 500-400 MG
CAPSULE ORAL
Qty: 100 STRIP | Refills: 3 | Status: SHIPPED | OUTPATIENT
Start: 2019-02-05 | End: 2019-02-28 | Stop reason: SDUPTHER

## 2019-02-28 DIAGNOSIS — M48.062 LUMBAR STENOSIS WITH NEUROGENIC CLAUDICATION: ICD-10-CM

## 2019-02-28 DIAGNOSIS — M15.9 PRIMARY OSTEOARTHRITIS INVOLVING MULTIPLE JOINTS: ICD-10-CM

## 2019-02-28 DIAGNOSIS — E66.01 MORBID OBESITY DUE TO EXCESS CALORIES (HCC): ICD-10-CM

## 2019-02-28 DIAGNOSIS — Z79.4 TYPE 2 DIABETES MELLITUS WITHOUT COMPLICATION, WITH LONG-TERM CURRENT USE OF INSULIN (HCC): ICD-10-CM

## 2019-02-28 DIAGNOSIS — E11.9 TYPE 2 DIABETES MELLITUS WITHOUT COMPLICATION, WITH LONG-TERM CURRENT USE OF INSULIN (HCC): ICD-10-CM

## 2019-02-28 DIAGNOSIS — I10 ESSENTIAL HYPERTENSION: ICD-10-CM

## 2019-02-28 RX ORDER — GLUCOSAMINE HCL/CHONDROITIN SU 500-400 MG
CAPSULE ORAL
Qty: 100 STRIP | Refills: 3 | Status: SHIPPED | OUTPATIENT
Start: 2019-02-28 | End: 2019-06-20 | Stop reason: SDUPTHER

## 2019-02-28 RX ORDER — TRAMADOL HYDROCHLORIDE 50 MG/1
50 TABLET ORAL EVERY 6 HOURS PRN
Qty: 120 TABLET | Refills: 0 | Status: SHIPPED | OUTPATIENT
Start: 2019-02-28 | End: 2019-04-01 | Stop reason: SDUPTHER

## 2019-02-28 RX ORDER — METOPROLOL TARTRATE 50 MG/1
TABLET, FILM COATED ORAL
Qty: 60 TABLET | Refills: 4 | Status: SHIPPED | OUTPATIENT
Start: 2019-02-28 | End: 2019-04-26 | Stop reason: SDUPTHER

## 2019-02-28 RX ORDER — FLUTICASONE PROPIONATE 50 MCG
SPRAY, SUSPENSION (ML) NASAL
Qty: 1 BOTTLE | Refills: 4 | Status: SHIPPED | OUTPATIENT
Start: 2019-02-28 | End: 2019-04-01 | Stop reason: SDUPTHER

## 2019-02-28 RX ORDER — PEN NEEDLE, DIABETIC 32GX 5/32"
100 NEEDLE, DISPOSABLE MISCELLANEOUS 2 TIMES DAILY
Qty: 100 EACH | Refills: 3 | Status: SHIPPED | OUTPATIENT
Start: 2019-02-28 | End: 2019-08-07 | Stop reason: SDUPTHER

## 2019-03-20 ENCOUNTER — OFFICE VISIT (OUTPATIENT)
Dept: FAMILY MEDICINE CLINIC | Age: 59
End: 2019-03-20
Payer: MEDICARE

## 2019-03-20 VITALS
WEIGHT: 289 LBS | OXYGEN SATURATION: 95 % | HEART RATE: 65 BPM | SYSTOLIC BLOOD PRESSURE: 132 MMHG | DIASTOLIC BLOOD PRESSURE: 80 MMHG | BODY MASS INDEX: 45.26 KG/M2

## 2019-03-20 DIAGNOSIS — Z79.4 TYPE 2 DIABETES MELLITUS WITHOUT COMPLICATION, WITH LONG-TERM CURRENT USE OF INSULIN (HCC): Primary | ICD-10-CM

## 2019-03-20 DIAGNOSIS — E11.9 TYPE 2 DIABETES MELLITUS WITHOUT COMPLICATION, WITH LONG-TERM CURRENT USE OF INSULIN (HCC): Primary | ICD-10-CM

## 2019-03-20 DIAGNOSIS — Z12.39 SCREENING FOR BREAST CANCER: ICD-10-CM

## 2019-03-20 LAB
CREATININE URINE POCT: 100
HBA1C MFR BLD: 6.8 %
MICROALBUMIN/CREAT 24H UR: 10 MG/G{CREAT}
MICROALBUMIN/CREAT UR-RTO: <30

## 2019-03-20 PROCEDURE — 83036 HEMOGLOBIN GLYCOSYLATED A1C: CPT | Performed by: FAMILY MEDICINE

## 2019-03-20 PROCEDURE — G8417 CALC BMI ABV UP PARAM F/U: HCPCS | Performed by: FAMILY MEDICINE

## 2019-03-20 PROCEDURE — 2022F DILAT RTA XM EVC RTNOPTHY: CPT | Performed by: FAMILY MEDICINE

## 2019-03-20 PROCEDURE — 82044 UR ALBUMIN SEMIQUANTITATIVE: CPT | Performed by: FAMILY MEDICINE

## 2019-03-20 PROCEDURE — 3044F HG A1C LEVEL LT 7.0%: CPT | Performed by: FAMILY MEDICINE

## 2019-03-20 PROCEDURE — 4004F PT TOBACCO SCREEN RCVD TLK: CPT | Performed by: FAMILY MEDICINE

## 2019-03-20 PROCEDURE — 99213 OFFICE O/P EST LOW 20 MIN: CPT | Performed by: FAMILY MEDICINE

## 2019-03-20 PROCEDURE — G8482 FLU IMMUNIZE ORDER/ADMIN: HCPCS | Performed by: FAMILY MEDICINE

## 2019-03-20 PROCEDURE — 3017F COLORECTAL CA SCREEN DOC REV: CPT | Performed by: FAMILY MEDICINE

## 2019-03-20 PROCEDURE — G8427 DOCREV CUR MEDS BY ELIG CLIN: HCPCS | Performed by: FAMILY MEDICINE

## 2019-03-20 ASSESSMENT — ENCOUNTER SYMPTOMS
SHORTNESS OF BREATH: 0
BACK PAIN: 1
COUGH: 0
DIARRHEA: 0
VOMITING: 0
SORE THROAT: 0
SINUS PRESSURE: 0
NAUSEA: 0

## 2019-04-01 DIAGNOSIS — M15.9 PRIMARY OSTEOARTHRITIS INVOLVING MULTIPLE JOINTS: ICD-10-CM

## 2019-04-01 DIAGNOSIS — M48.062 LUMBAR STENOSIS WITH NEUROGENIC CLAUDICATION: ICD-10-CM

## 2019-04-01 RX ORDER — TRAMADOL HYDROCHLORIDE 50 MG/1
50 TABLET ORAL EVERY 6 HOURS PRN
Qty: 120 TABLET | Refills: 0 | Status: SHIPPED | OUTPATIENT
Start: 2019-04-01 | End: 2019-04-26 | Stop reason: SDUPTHER

## 2019-04-01 RX ORDER — FLUTICASONE PROPIONATE 50 MCG
SPRAY, SUSPENSION (ML) NASAL
Qty: 1 BOTTLE | Refills: 4 | Status: SHIPPED | OUTPATIENT
Start: 2019-04-01 | End: 2019-04-26 | Stop reason: SDUPTHER

## 2019-04-01 NOTE — TELEPHONE ENCOUNTER
Last visit: 3/20/19  Last Med refill: 2/28/19  Does patient have enough medication for 72 hours: No    Next Visit Date:  Future Appointments   Date Time Provider Lane Gipson   6/21/2019  2:00 PM Dale Etienne  [address] [facility] Maintenance   Topic Date Due    Hepatitis C screen  1960    Diabetic retinal exam  08/27/1970    HIV screen  08/27/1975    Hepatitis B Vaccine (1 of 3 - Risk 3-dose series) 08/27/1979    DTaP/Tdap/Td vaccine (1 - Tdap) 08/27/1979    Shingles Vaccine (1 of 2) 08/27/2010    Colon cancer screen colonoscopy  02/08/2017    Breast cancer screen  01/07/2018    Lipid screen  10/16/2018    Potassium monitoring  11/17/2019    Creatinine monitoring  11/17/2019    Diabetic foot exam  03/20/2020    A1C test (Diabetic or Prediabetic)  03/20/2020    Diabetic microalbuminuria test  03/20/2020    Flu vaccine  Completed    Pneumococcal 0-64 years at Risk Vaccine  Completed       Hemoglobin A1C (%)   Date Value   03/20/2019 6.8   12/03/2018 9.5   10/16/2017 5.7             ( goal A1C is < 7)   No results found for: LABMICR  LDL Calculated (mg/dL)   Date Value   10/16/2017 76   12/07/2016 79       (goal LDL is <100)   AST (IU/L)   Date Value   11/17/2018 19     ALT (IU/L)   Date Value   11/17/2018 22     BUN (mg/dL)   Date Value   11/17/2018 19     BP Readings from Last 3 Encounters:   03/20/19 132/80   12/03/18 130/80   09/13/18 130/80          (goal 120/80)    All Future Testing planned in [facility]  Lab Frequency Next Occurrence   CBC Auto Differential Once 10/13/2018   Comprehensive Metabolic Panel Once 20/51/2786   Lipid Panel Once 10/13/2018   Hemoglobin A1C Once 10/13/2018   XR CHEST STANDARD (2 VW) Once 09/13/2018   XR HIP RIGHT (2-3 VIEWS) Once 12/17/2018   KAMINI DIGITAL SCREEN W OR WO CAD BILATERAL Once 03/20/2019               Patient Active Problem List:     S/P REID-BSO     Osteoarthritis     Hypertension     Chronic headaches     Lumbar stenosis with neurogenic claudication     Morbid obesity due to excess calories (HCC)     Colon polyps     Major depressive disorder, recurrent episode, severe with mixed features ([facility] [address]     Fibromyalgia     Type 2 diabetes mellitus without complication, with long-term current use of insulin ([facility] [address]

## 2019-04-03 ENCOUNTER — HOSPITAL ENCOUNTER (OUTPATIENT)
Dept: MAMMOGRAPHY | Age: 59
Discharge: HOME | End: 2019-04-05
Payer: MEDICARE

## 2019-04-03 DIAGNOSIS — Z12.39 SCREENING FOR BREAST CANCER: ICD-10-CM

## 2019-04-03 PROCEDURE — 77063 BREAST TOMOSYNTHESIS BI: CPT

## 2019-04-09 ENCOUNTER — HOSPITAL ENCOUNTER (OUTPATIENT)
Dept: MAMMOGRAPHY | Age: 59
Discharge: HOME | End: 2019-04-11
Payer: MEDICARE

## 2019-04-09 ENCOUNTER — HOSPITAL ENCOUNTER (OUTPATIENT)
Dept: ULTRASOUND IMAGING | Age: 59
Discharge: HOME | End: 2019-04-11
Payer: MEDICARE

## 2019-04-09 DIAGNOSIS — R92.8 ABNORMAL MAMMOGRAM: ICD-10-CM

## 2019-04-09 PROCEDURE — 76642 ULTRASOUND BREAST LIMITED: CPT

## 2019-04-09 PROCEDURE — G0279 TOMOSYNTHESIS, MAMMO: HCPCS

## 2019-04-16 ENCOUNTER — HOSPITAL ENCOUNTER (OUTPATIENT)
Dept: MAMMOGRAPHY | Age: 59
Discharge: HOME | End: 2019-04-18
Payer: MEDICARE

## 2019-04-16 VITALS — DIASTOLIC BLOOD PRESSURE: 92 MMHG | HEART RATE: 73 BPM | SYSTOLIC BLOOD PRESSURE: 142 MMHG

## 2019-04-16 DIAGNOSIS — R92.8 ABNORMAL MAMMOGRAM: ICD-10-CM

## 2019-04-16 PROCEDURE — 19082 BX BREAST ADD LESION STRTCTC: CPT

## 2019-04-16 PROCEDURE — 2500000003 HC RX 250 WO HCPCS

## 2019-04-16 PROCEDURE — 88305 TISSUE EXAM BY PATHOLOGIST: CPT

## 2019-04-16 PROCEDURE — A4648 IMPLANTABLE TISSUE MARKER: HCPCS

## 2019-04-16 PROCEDURE — 88342 IMHCHEM/IMCYTCHM 1ST ANTB: CPT

## 2019-04-16 PROCEDURE — 19081 BX BREAST 1ST LESION STRTCTC: CPT

## 2019-04-18 LAB — SURGICAL PATHOLOGY REPORT: NORMAL

## 2019-04-26 DIAGNOSIS — I10 ESSENTIAL HYPERTENSION: ICD-10-CM

## 2019-04-26 DIAGNOSIS — M48.062 LUMBAR STENOSIS WITH NEUROGENIC CLAUDICATION: ICD-10-CM

## 2019-04-26 DIAGNOSIS — M15.9 PRIMARY OSTEOARTHRITIS INVOLVING MULTIPLE JOINTS: ICD-10-CM

## 2019-04-26 RX ORDER — TRAMADOL HYDROCHLORIDE 50 MG/1
50 TABLET ORAL EVERY 6 HOURS PRN
Qty: 120 TABLET | Refills: 0 | OUTPATIENT
Start: 2019-04-26 | End: 2019-05-26

## 2019-04-26 RX ORDER — METOPROLOL TARTRATE 50 MG/1
TABLET, FILM COATED ORAL
Qty: 60 TABLET | Refills: 0 | Status: SHIPPED | OUTPATIENT
Start: 2019-04-26 | End: 2019-06-11 | Stop reason: SDUPTHER

## 2019-04-26 RX ORDER — FLUTICASONE PROPIONATE 50 MCG
SPRAY, SUSPENSION (ML) NASAL
Qty: 1 BOTTLE | Refills: 4 | Status: SHIPPED | OUTPATIENT
Start: 2019-04-26 | End: 2019-05-24 | Stop reason: SDUPTHER

## 2019-04-26 NOTE — TELEPHONE ENCOUNTER
Last visit: 3/20/19    Next Visit Date:  Future Appointments   Date Time Provider Lane Edwardsi   6/24/2019  2:30 PM Azul Fontaine  [address] [facility]   Topic Date Due    Hepatitis C screen  1960    Diabetic retinal exam  08/27/1970    HIV screen  08/27/1975    Hepatitis B Vaccine (1 of 3 - Risk 3-dose series) 08/27/1979    DTaP/Tdap/Td vaccine (1 - Tdap) 08/27/1979    Shingles Vaccine (1 of 2) 08/27/2010    Colon cancer screen colonoscopy  02/08/2017    Lipid screen  10/16/2018    Potassium monitoring  11/17/2019    Creatinine monitoring  11/17/2019    Diabetic foot exam  03/20/2020    A1C test (Diabetic or Prediabetic)  03/20/2020    Diabetic microalbuminuria test  03/20/2020    Breast cancer screen  04/16/2021    Flu vaccine  Completed    Pneumococcal 0-64 years Vaccine  Completed       Hemoglobin A1C (%)   Date Value   03/20/2019 6.8   12/03/2018 9.5   10/16/2017 5.7             ( goal A1C is < 7)   No results found for: LABMICR  LDL Calculated (mg/dL)   Date Value   10/16/2017 76   12/07/2016 79       (goal LDL is <100)   AST (IU/L)   Date Value   11/17/2018 19     ALT (IU/L)   Date Value   11/17/2018 22     BUN (mg/dL)   Date Value   11/17/2018 19     BP Readings from Last 3 Encounters:   04/16/19 (!) 142/92   03/20/19 132/80   12/03/18 130/80          (goal 120/80)    All Future Testing planned in [facility]  Lab Frequency Next Occurrence   CBC Auto Differential Once 10/13/2018   Comprehensive Metabolic Panel Once 83/12/2537   Lipid Panel Once 10/13/2018   Hemoglobin A1C Once 10/13/2018   XR CHEST STANDARD (2 VW) Once 09/13/2018   XR HIP RIGHT (2-3 VIEWS) Once 12/17/2018               Patient Active Problem List:     S/P REID-BSO     Osteoarthritis     Hypertension     Chronic headaches     Lumbar stenosis with neurogenic claudication     Morbid obesity due to excess calories (HCC)     Colon polyps     Major depressive disorder, recurrent episode, severe with mixed features ([facility] [address]     Fibromyalgia     Type 2 diabetes mellitus without complication, with long-term current use of insulin ([facility] [address]

## 2019-04-29 RX ORDER — TRAMADOL HYDROCHLORIDE 50 MG/1
TABLET ORAL
Qty: 120 TABLET | Refills: 0 | Status: SHIPPED | OUTPATIENT
Start: 2019-04-29 | End: 2019-05-24 | Stop reason: SDUPTHER

## 2019-04-29 NOTE — TELEPHONE ENCOUNTER
Last visit: 3/20/19  Last Med refill: 4/1/19    Next Visit Date:  Future Appointments   Date Time Provider Lane Edwardsi   6/24/2019  2:30 PM Tigre Hurley  [address] [facility]   Topic Date Due    Hepatitis C screen  1960    Diabetic retinal exam  08/27/1970    HIV screen  08/27/1975    Hepatitis B Vaccine (1 of 3 - Risk 3-dose series) 08/27/1979    DTaP/Tdap/Td vaccine (1 - Tdap) 08/27/1979    Shingles Vaccine (1 of 2) 08/27/2010    Colon cancer screen colonoscopy  02/08/2017    Lipid screen  10/16/2018    Potassium monitoring  11/17/2019    Creatinine monitoring  11/17/2019    Diabetic foot exam  03/20/2020    A1C test (Diabetic or Prediabetic)  03/20/2020    Diabetic microalbuminuria test  03/20/2020    Breast cancer screen  04/16/2021    Flu vaccine  Completed    Pneumococcal 0-64 years Vaccine  Completed       Hemoglobin A1C (%)   Date Value   03/20/2019 6.8   12/03/2018 9.5   10/16/2017 5.7             ( goal A1C is < 7)   No results found for: LABMICR  LDL Calculated (mg/dL)   Date Value   10/16/2017 76   12/07/2016 79       (goal LDL is <100)   AST (IU/L)   Date Value   11/17/2018 19     ALT (IU/L)   Date Value   11/17/2018 22     BUN (mg/dL)   Date Value   11/17/2018 19     BP Readings from Last 3 Encounters:   04/16/19 (!) 142/92   03/20/19 132/80   12/03/18 130/80          (goal 120/80)    All Future Testing planned in [facility]  Lab Frequency Next Occurrence   CBC Auto Differential Once 10/13/2018   Comprehensive Metabolic Panel Once 85/24/6289   Lipid Panel Once 10/13/2018   Hemoglobin A1C Once 10/13/2018   XR CHEST STANDARD (2 VW) Once 09/13/2018   XR HIP RIGHT (2-3 VIEWS) Once 12/17/2018               Patient Active Problem List:     S/P REID-BSO     Osteoarthritis     Hypertension     Chronic headaches     Lumbar stenosis with neurogenic claudication     Morbid obesity due to excess calories (HCC)     Colon polyps     Major depressive disorder, recurrent episode, severe with mixed features ([facility] [address]     Fibromyalgia     Type 2 diabetes mellitus without complication, with long-term current use of insulin ([facility] [address]

## 2019-05-24 DIAGNOSIS — M15.9 PRIMARY OSTEOARTHRITIS INVOLVING MULTIPLE JOINTS: ICD-10-CM

## 2019-05-24 DIAGNOSIS — M48.062 LUMBAR STENOSIS WITH NEUROGENIC CLAUDICATION: ICD-10-CM

## 2019-05-24 RX ORDER — FLUTICASONE PROPIONATE 50 MCG
SPRAY, SUSPENSION (ML) NASAL
Qty: 1 BOTTLE | Refills: 4 | Status: SHIPPED | OUTPATIENT
Start: 2019-05-24 | End: 2019-06-20 | Stop reason: SDUPTHER

## 2019-05-24 RX ORDER — TRAMADOL HYDROCHLORIDE 50 MG/1
50 TABLET ORAL EVERY 6 HOURS PRN
Qty: 120 TABLET | Refills: 0 | Status: SHIPPED | OUTPATIENT
Start: 2019-05-24 | End: 2019-06-20 | Stop reason: SDUPTHER

## 2019-05-24 NOTE — TELEPHONE ENCOUNTER
Last visit: 3/20/19  Oars please review today      Next Visit Date:  Future Appointments   Date Time Provider Lane Edwardsi   6/24/2019  2:30 PM Tigre Hurley  [address] [facility]   Topic Date Due    Hepatitis C screen  1960    Diabetic retinal exam  08/27/1970    HIV screen  08/27/1975    Hepatitis B Vaccine (1 of 3 - Risk 3-dose series) 08/27/1979    DTaP/Tdap/Td vaccine (1 - Tdap) 08/27/1979    Shingles Vaccine (1 of 2) 08/27/2010    Colon cancer screen colonoscopy  02/08/2017    Lipid screen  10/16/2018    Potassium monitoring  11/17/2019    Creatinine monitoring  11/17/2019    Diabetic foot exam  03/20/2020    A1C test (Diabetic or Prediabetic)  03/20/2020    Diabetic microalbuminuria test  03/20/2020    Breast cancer screen  04/16/2021    Flu vaccine  Completed    Pneumococcal 0-64 years Vaccine  Completed       Hemoglobin A1C (%)   Date Value   03/20/2019 6.8   12/03/2018 9.5   10/16/2017 5.7             ( goal A1C is < 7)   No results found for: LABMICR  LDL Calculated (mg/dL)   Date Value   10/16/2017 76   12/07/2016 79       (goal LDL is <100)   AST (IU/L)   Date Value   11/17/2018 19     ALT (IU/L)   Date Value   11/17/2018 22     BUN (mg/dL)   Date Value   11/17/2018 19     BP Readings from Last 3 Encounters:   04/16/19 (!) 142/92   03/20/19 132/80   12/03/18 130/80          (goal 120/80)    All Future Testing planned in [facility]  Lab Frequency Next Occurrence   CBC Auto Differential Once 10/13/2018   Comprehensive Metabolic Panel Once 63/26/8100   Lipid Panel Once 10/13/2018   Hemoglobin A1C Once 10/13/2018   XR CHEST STANDARD (2 VW) Once 09/13/2018   XR HIP RIGHT (2-3 VIEWS) Once 12/17/2018               Patient Active Problem List:     S/P REID-BSO     Osteoarthritis     Hypertension     Chronic headaches     Lumbar stenosis with neurogenic claudication     Morbid obesity due to excess calories (HCC)     Colon polyps     Major depressive disorder, recurrent episode, severe with mixed features ([facility] [address]     Fibromyalgia     Type 2 diabetes mellitus without complication, with long-term current use of insulin ([facility] [address]

## 2019-06-11 DIAGNOSIS — I10 ESSENTIAL HYPERTENSION: ICD-10-CM

## 2019-06-11 RX ORDER — METOPROLOL TARTRATE 50 MG/1
TABLET, FILM COATED ORAL
Qty: 60 TABLET | Refills: 3 | Status: SHIPPED | OUTPATIENT
Start: 2019-06-11 | End: 2019-07-17 | Stop reason: SDUPTHER

## 2019-06-11 NOTE — TELEPHONE ENCOUNTER
Last visit: 3/20/19  Last Med refill:   Does patient have enough medication for 72 hours:     Next Visit Date:  Future Appointments   Date Time Provider Lane Braristi   6/24/2019  2:30 PM Donna Duran  [address] [facility]   Topic Date Due    Hepatitis C screen  1960    Diabetic retinal exam  08/27/1970    HIV screen  08/27/1975    Hepatitis B Vaccine (1 of 3 - Risk 3-dose series) 08/27/1979    DTaP/Tdap/Td vaccine (1 - Tdap) 08/27/1979    Shingles Vaccine (1 of 2) 08/27/2010    Colon cancer screen colonoscopy  02/08/2017    Lipid screen  10/16/2018    Potassium monitoring  11/17/2019    Creatinine monitoring  11/17/2019    Diabetic foot exam  03/20/2020    A1C test (Diabetic or Prediabetic)  03/20/2020    Diabetic microalbuminuria test  03/20/2020    Breast cancer screen  04/16/2021    Flu vaccine  Completed    Pneumococcal 0-64 years Vaccine  Completed       Hemoglobin A1C (%)   Date Value   03/20/2019 6.8   12/03/2018 9.5   10/16/2017 5.7             ( goal A1C is < 7)   No results found for: LABMICR  LDL Calculated (mg/dL)   Date Value   10/16/2017 76   12/07/2016 79       (goal LDL is <100)   AST (IU/L)   Date Value   11/17/2018 19     ALT (IU/L)   Date Value   11/17/2018 22     BUN (mg/dL)   Date Value   11/17/2018 19     BP Readings from Last 3 Encounters:   04/16/19 (!) 142/92   03/20/19 132/80   12/03/18 130/80          (goal 120/80)    All Future Testing planned in [facility]  Lab Frequency Next Occurrence   CBC Auto Differential Once 10/13/2018   Comprehensive Metabolic Panel Once 61/49/7203   Lipid Panel Once 10/13/2018   Hemoglobin A1C Once 10/13/2018   XR CHEST STANDARD (2 VW) Once 09/13/2018   XR HIP RIGHT (2-3 VIEWS) Once 12/17/2018               Patient Active Problem List:     S/P REID-BSO     Osteoarthritis     Hypertension     Chronic headaches     Lumbar stenosis with neurogenic claudication     Morbid obesity due to excess calories ([address] ROSE MARY/HARISH Discharge Plan  Informed patient is ready for discharge. Patientâs discharge destination is Home/apartment with Services/Support. Rafi De Oliveira RN to meet pt at his home shortly after 1300, as arranged and updated this AM by unit . Patient stated that he is no longer able to receive transportation from his friend this morning and has no other resources for transport. Patient to be picked up by [facility] at [phone], but stated he has no way to pay for the $47.50 for the ride after much conversation. SW Manager authorized for [facility] to pay for wheelchair [employer]. Patient/interested person has been counseled for post hospitalization care. Patient agrees and understands goals and plan. Initial implementation of the patientâs discharge plan has been arranged, including any devices/equipment needed for discharge. Discharge plan communicated to MD, RN and patient.

## 2019-06-20 DIAGNOSIS — E11.9 TYPE 2 DIABETES MELLITUS WITHOUT COMPLICATION, WITH LONG-TERM CURRENT USE OF INSULIN (HCC): ICD-10-CM

## 2019-06-20 DIAGNOSIS — M15.9 PRIMARY OSTEOARTHRITIS INVOLVING MULTIPLE JOINTS: ICD-10-CM

## 2019-06-20 DIAGNOSIS — Z79.4 TYPE 2 DIABETES MELLITUS WITHOUT COMPLICATION, WITH LONG-TERM CURRENT USE OF INSULIN (HCC): ICD-10-CM

## 2019-06-20 DIAGNOSIS — M48.062 LUMBAR STENOSIS WITH NEUROGENIC CLAUDICATION: ICD-10-CM

## 2019-06-20 DIAGNOSIS — E66.01 MORBID OBESITY DUE TO EXCESS CALORIES (HCC): ICD-10-CM

## 2019-06-20 RX ORDER — FLUTICASONE PROPIONATE 50 MCG
SPRAY, SUSPENSION (ML) NASAL
Qty: 1 BOTTLE | Refills: 0 | Status: SHIPPED | OUTPATIENT
Start: 2019-06-20 | End: 2019-07-17 | Stop reason: SDUPTHER

## 2019-06-20 RX ORDER — GLUCOSAMINE HCL/CHONDROITIN SU 500-400 MG
CAPSULE ORAL
Qty: 100 STRIP | Refills: 0 | Status: SHIPPED | OUTPATIENT
Start: 2019-06-20 | End: 2019-07-17 | Stop reason: SDUPTHER

## 2019-06-20 RX ORDER — TRAMADOL HYDROCHLORIDE 50 MG/1
50 TABLET ORAL EVERY 6 HOURS PRN
Qty: 120 TABLET | Refills: 0 | Status: SHIPPED | OUTPATIENT
Start: 2019-06-20 | End: 2019-07-17 | Stop reason: SDUPTHER

## 2019-06-26 ENCOUNTER — TELEPHONE (OUTPATIENT)
Dept: FAMILY MEDICINE CLINIC | Age: 59
End: 2019-06-26

## 2019-07-17 DIAGNOSIS — E11.9 TYPE 2 DIABETES MELLITUS WITHOUT COMPLICATION, WITH LONG-TERM CURRENT USE OF INSULIN (HCC): ICD-10-CM

## 2019-07-17 DIAGNOSIS — E66.01 MORBID OBESITY DUE TO EXCESS CALORIES (HCC): ICD-10-CM

## 2019-07-17 DIAGNOSIS — M48.062 LUMBAR STENOSIS WITH NEUROGENIC CLAUDICATION: ICD-10-CM

## 2019-07-17 DIAGNOSIS — Z79.4 TYPE 2 DIABETES MELLITUS WITHOUT COMPLICATION, WITH LONG-TERM CURRENT USE OF INSULIN (HCC): ICD-10-CM

## 2019-07-17 DIAGNOSIS — I10 ESSENTIAL HYPERTENSION: ICD-10-CM

## 2019-07-17 DIAGNOSIS — M15.9 PRIMARY OSTEOARTHRITIS INVOLVING MULTIPLE JOINTS: ICD-10-CM

## 2019-07-17 RX ORDER — FLUTICASONE PROPIONATE 50 MCG
SPRAY, SUSPENSION (ML) NASAL
Qty: 1 BOTTLE | Refills: 0 | Status: SHIPPED | OUTPATIENT
Start: 2019-07-17 | End: 2019-08-14 | Stop reason: SDUPTHER

## 2019-07-17 RX ORDER — METOPROLOL TARTRATE 50 MG/1
TABLET, FILM COATED ORAL
Qty: 60 TABLET | Refills: 3 | Status: SHIPPED | OUTPATIENT
Start: 2019-07-17 | End: 2019-08-14 | Stop reason: SDUPTHER

## 2019-07-17 RX ORDER — GLUCOSAMINE HCL/CHONDROITIN SU 500-400 MG
CAPSULE ORAL
Qty: 100 STRIP | Refills: 0 | Status: SHIPPED | OUTPATIENT
Start: 2019-07-17 | End: 2019-08-07 | Stop reason: SDUPTHER

## 2019-07-17 RX ORDER — TRAMADOL HYDROCHLORIDE 50 MG/1
50 TABLET ORAL EVERY 6 HOURS PRN
Qty: 120 TABLET | Refills: 0 | Status: SHIPPED | OUTPATIENT
Start: 2019-07-17 | End: 2019-08-14 | Stop reason: SDUPTHER

## 2019-07-18 ENCOUNTER — OFFICE VISIT (OUTPATIENT)
Dept: FAMILY MEDICINE CLINIC | Age: 59
End: 2019-07-18
Payer: MEDICARE

## 2019-07-18 VITALS
OXYGEN SATURATION: 96 % | DIASTOLIC BLOOD PRESSURE: 80 MMHG | BODY MASS INDEX: 45.74 KG/M2 | WEIGHT: 291.4 LBS | HEIGHT: 67 IN | SYSTOLIC BLOOD PRESSURE: 136 MMHG | HEART RATE: 82 BPM

## 2019-07-18 DIAGNOSIS — Z79.4 TYPE 2 DIABETES MELLITUS WITHOUT COMPLICATION, WITH LONG-TERM CURRENT USE OF INSULIN (HCC): ICD-10-CM

## 2019-07-18 DIAGNOSIS — F33.2 MAJOR DEPRESSIVE DISORDER, RECURRENT EPISODE, SEVERE WITH MIXED FEATURES (HCC): ICD-10-CM

## 2019-07-18 DIAGNOSIS — L97.911 ULCER OF RIGHT LOWER EXTREMITY, LIMITED TO BREAKDOWN OF SKIN (HCC): Primary | ICD-10-CM

## 2019-07-18 DIAGNOSIS — E11.9 TYPE 2 DIABETES MELLITUS WITHOUT COMPLICATION, WITH LONG-TERM CURRENT USE OF INSULIN (HCC): ICD-10-CM

## 2019-07-18 DIAGNOSIS — M79.671 FOOT PAIN, BILATERAL: ICD-10-CM

## 2019-07-18 DIAGNOSIS — M79.672 FOOT PAIN, BILATERAL: ICD-10-CM

## 2019-07-18 LAB — HBA1C MFR BLD: 6.4 %

## 2019-07-18 PROCEDURE — 99213 OFFICE O/P EST LOW 20 MIN: CPT | Performed by: FAMILY MEDICINE

## 2019-07-18 PROCEDURE — 3017F COLORECTAL CA SCREEN DOC REV: CPT | Performed by: FAMILY MEDICINE

## 2019-07-18 PROCEDURE — G8427 DOCREV CUR MEDS BY ELIG CLIN: HCPCS | Performed by: FAMILY MEDICINE

## 2019-07-18 PROCEDURE — 3044F HG A1C LEVEL LT 7.0%: CPT | Performed by: FAMILY MEDICINE

## 2019-07-18 PROCEDURE — 2022F DILAT RTA XM EVC RTNOPTHY: CPT | Performed by: FAMILY MEDICINE

## 2019-07-18 PROCEDURE — 83036 HEMOGLOBIN GLYCOSYLATED A1C: CPT | Performed by: FAMILY MEDICINE

## 2019-07-18 PROCEDURE — G8417 CALC BMI ABV UP PARAM F/U: HCPCS | Performed by: FAMILY MEDICINE

## 2019-07-18 PROCEDURE — 4004F PT TOBACCO SCREEN RCVD TLK: CPT | Performed by: FAMILY MEDICINE

## 2019-07-18 RX ORDER — CEPHALEXIN 500 MG/1
500 CAPSULE ORAL 3 TIMES DAILY
Qty: 30 CAPSULE | Refills: 0 | Status: SHIPPED | OUTPATIENT
Start: 2019-07-18 | End: 2019-08-02 | Stop reason: SDUPTHER

## 2019-07-18 ASSESSMENT — ENCOUNTER SYMPTOMS
SORE THROAT: 0
COUGH: 0
DIARRHEA: 0
VOMITING: 0
SHORTNESS OF BREATH: 0
BACK PAIN: 1
SINUS PRESSURE: 0
NAUSEA: 0

## 2019-08-05 RX ORDER — CEPHALEXIN 500 MG/1
500 CAPSULE ORAL 3 TIMES DAILY
Qty: 30 CAPSULE | Refills: 0 | Status: SHIPPED | OUTPATIENT
Start: 2019-08-05 | End: 2019-08-26 | Stop reason: SDUPTHER

## 2019-08-07 DIAGNOSIS — Z79.4 TYPE 2 DIABETES MELLITUS WITHOUT COMPLICATION, WITH LONG-TERM CURRENT USE OF INSULIN (HCC): ICD-10-CM

## 2019-08-07 DIAGNOSIS — E11.9 TYPE 2 DIABETES MELLITUS WITHOUT COMPLICATION, WITH LONG-TERM CURRENT USE OF INSULIN (HCC): ICD-10-CM

## 2019-08-07 DIAGNOSIS — E66.01 MORBID OBESITY DUE TO EXCESS CALORIES (HCC): ICD-10-CM

## 2019-08-07 DIAGNOSIS — L97.911 ULCER OF RIGHT LOWER EXTREMITY, LIMITED TO BREAKDOWN OF SKIN (HCC): ICD-10-CM

## 2019-08-07 RX ORDER — GLUCOSAMINE HCL/CHONDROITIN SU 500-400 MG
CAPSULE ORAL
Qty: 100 STRIP | Refills: 0 | Status: SHIPPED | OUTPATIENT
Start: 2019-08-07 | End: 2019-09-12 | Stop reason: SDUPTHER

## 2019-08-07 RX ORDER — PEN NEEDLE, DIABETIC 32GX 5/32"
100 NEEDLE, DISPOSABLE MISCELLANEOUS 2 TIMES DAILY
Qty: 100 EACH | Refills: 3 | Status: SHIPPED | OUTPATIENT
Start: 2019-08-07 | End: 2021-05-18 | Stop reason: SDUPTHER

## 2019-08-14 ENCOUNTER — OFFICE VISIT (OUTPATIENT)
Dept: PODIATRY | Age: 59
End: 2019-08-14
Payer: MEDICARE

## 2019-08-14 VITALS — BODY MASS INDEX: 45.58 KG/M2 | WEIGHT: 291 LBS

## 2019-08-14 DIAGNOSIS — D23.72 BENIGN NEOPLASM OF SKIN OF LEFT FOOT: ICD-10-CM

## 2019-08-14 DIAGNOSIS — M15.9 PRIMARY OSTEOARTHRITIS INVOLVING MULTIPLE JOINTS: ICD-10-CM

## 2019-08-14 DIAGNOSIS — Z79.4 TYPE 2 DIABETES MELLITUS WITHOUT COMPLICATION, WITH LONG-TERM CURRENT USE OF INSULIN (HCC): Primary | ICD-10-CM

## 2019-08-14 DIAGNOSIS — M79.604 PAIN IN BOTH LOWER EXTREMITIES: ICD-10-CM

## 2019-08-14 DIAGNOSIS — M79.605 PAIN IN BOTH LOWER EXTREMITIES: ICD-10-CM

## 2019-08-14 DIAGNOSIS — I10 ESSENTIAL HYPERTENSION: ICD-10-CM

## 2019-08-14 DIAGNOSIS — M48.062 LUMBAR STENOSIS WITH NEUROGENIC CLAUDICATION: ICD-10-CM

## 2019-08-14 DIAGNOSIS — E11.9 TYPE 2 DIABETES MELLITUS WITHOUT COMPLICATION, WITH LONG-TERM CURRENT USE OF INSULIN (HCC): Primary | ICD-10-CM

## 2019-08-14 PROCEDURE — 3017F COLORECTAL CA SCREEN DOC REV: CPT | Performed by: PODIATRIST

## 2019-08-14 PROCEDURE — 2022F DILAT RTA XM EVC RTNOPTHY: CPT | Performed by: PODIATRIST

## 2019-08-14 PROCEDURE — 17110 DESTRUCTION B9 LES UP TO 14: CPT | Performed by: PODIATRIST

## 2019-08-14 PROCEDURE — 3044F HG A1C LEVEL LT 7.0%: CPT | Performed by: PODIATRIST

## 2019-08-14 PROCEDURE — 4004F PT TOBACCO SCREEN RCVD TLK: CPT | Performed by: PODIATRIST

## 2019-08-14 PROCEDURE — G8428 CUR MEDS NOT DOCUMENT: HCPCS | Performed by: PODIATRIST

## 2019-08-14 PROCEDURE — G8417 CALC BMI ABV UP PARAM F/U: HCPCS | Performed by: PODIATRIST

## 2019-08-14 PROCEDURE — 99203 OFFICE O/P NEW LOW 30 MIN: CPT | Performed by: PODIATRIST

## 2019-08-14 RX ORDER — METOPROLOL TARTRATE 50 MG/1
TABLET, FILM COATED ORAL
Qty: 60 TABLET | Refills: 3 | Status: SHIPPED | OUTPATIENT
Start: 2019-08-14 | End: 2019-09-12 | Stop reason: SDUPTHER

## 2019-08-14 RX ORDER — TRAMADOL HYDROCHLORIDE 50 MG/1
50 TABLET ORAL EVERY 6 HOURS PRN
Qty: 120 TABLET | Refills: 0 | Status: SHIPPED | OUTPATIENT
Start: 2019-08-14 | End: 2019-09-12 | Stop reason: SDUPTHER

## 2019-08-14 RX ORDER — FLUTICASONE PROPIONATE 50 MCG
SPRAY, SUSPENSION (ML) NASAL
Qty: 1 BOTTLE | Refills: 0 | Status: SHIPPED | OUTPATIENT
Start: 2019-08-14 | End: 2019-09-12 | Stop reason: SDUPTHER

## 2019-08-14 NOTE — TELEPHONE ENCOUNTER
oarrs 5/24/19    Last visit: 7/18/19    Last Med refill: 7/17/19    Does patient have enough medication for 72 hours: no    Next Visit Date:  Future Appointments   Date Time Provider Lane Leonela   8/14/2019  1:45 PM Katherine Kay DPM [facility] [facility]   10/18/2019  3:30 PM Aura Abdi  [address] [facility]   Topic Date Due    Hepatitis C screen  1960    Diabetic retinal exam  08/27/1970    HIV screen  08/27/1975    Hepatitis B Vaccine (1 of 3 - Risk 3-dose series) 08/27/1979    DTaP/Tdap/Td vaccine (1 - Tdap) 08/27/1979    Shingles Vaccine (1 of 2) 08/27/2010    Colon cancer screen colonoscopy  02/08/2017    Lipid screen  10/16/2018    Flu vaccine (1) 09/01/2019    Potassium monitoring  11/17/2019    Creatinine monitoring  11/17/2019    Diabetic foot exam  03/20/2020    Diabetic microalbuminuria test  03/20/2020    A1C test (Diabetic or Prediabetic)  07/18/2020    Breast cancer screen  04/16/2021    Pneumococcal 0-64 years Vaccine  Completed       Hemoglobin A1C (%)   Date Value   07/18/2019 6.4   03/20/2019 6.8   12/03/2018 9.5             ( goal A1C is < 7)   No results found for: LABMICR  LDL Calculated (mg/dL)   Date Value   10/16/2017 76   12/07/2016 79       (goal LDL is <100)   AST (IU/L)   Date Value   11/17/2018 19     ALT (IU/L)   Date Value   11/17/2018 22     BUN (mg/dL)   Date Value   11/17/2018 19     BP Readings from Last 3 Encounters:   07/18/19 136/80   04/16/19 (!) 142/92   03/20/19 132/80          (goal 120/80)    All Future Testing planned in [facility]  Lab Frequency Next Occurrence   CBC Auto Differential Once 10/13/2018   Comprehensive Metabolic Panel Once 46/77/1151   Lipid Panel Once 10/13/2018   Hemoglobin A1C Once 10/13/2018   XR CHEST STANDARD (2 VW) Once 09/13/2018   XR HIP RIGHT (2-3 VIEWS) Once 12/17/2018               Patient Active Problem List:     S/P REID-BSO     Osteoarthritis     Hypertension     Chronic headaches     Lumbar

## 2019-08-26 RX ORDER — CEPHALEXIN 500 MG/1
500 CAPSULE ORAL 3 TIMES DAILY
Qty: 30 CAPSULE | Refills: 0 | Status: SHIPPED | OUTPATIENT
Start: 2019-08-26 | End: 2019-09-12 | Stop reason: SDUPTHER

## 2019-09-12 DIAGNOSIS — M48.062 LUMBAR STENOSIS WITH NEUROGENIC CLAUDICATION: ICD-10-CM

## 2019-09-12 DIAGNOSIS — I10 ESSENTIAL HYPERTENSION: ICD-10-CM

## 2019-09-12 DIAGNOSIS — Z79.4 TYPE 2 DIABETES MELLITUS WITHOUT COMPLICATION, WITH LONG-TERM CURRENT USE OF INSULIN (HCC): ICD-10-CM

## 2019-09-12 DIAGNOSIS — E11.9 TYPE 2 DIABETES MELLITUS WITHOUT COMPLICATION, WITH LONG-TERM CURRENT USE OF INSULIN (HCC): ICD-10-CM

## 2019-09-12 DIAGNOSIS — M15.9 PRIMARY OSTEOARTHRITIS INVOLVING MULTIPLE JOINTS: ICD-10-CM

## 2019-09-12 DIAGNOSIS — L97.911 ULCER OF RIGHT LOWER EXTREMITY, LIMITED TO BREAKDOWN OF SKIN (HCC): ICD-10-CM

## 2019-09-12 DIAGNOSIS — E66.01 MORBID OBESITY DUE TO EXCESS CALORIES (HCC): ICD-10-CM

## 2019-09-12 RX ORDER — FLUTICASONE PROPIONATE 50 MCG
SPRAY, SUSPENSION (ML) NASAL
Qty: 1 BOTTLE | Refills: 0 | Status: SHIPPED | OUTPATIENT
Start: 2019-09-12 | End: 2019-10-08 | Stop reason: SDUPTHER

## 2019-09-12 RX ORDER — CEPHALEXIN 500 MG/1
500 CAPSULE ORAL 3 TIMES DAILY
Qty: 30 CAPSULE | Refills: 0 | Status: SHIPPED | OUTPATIENT
Start: 2019-09-12 | End: 2019-09-27 | Stop reason: SDUPTHER

## 2019-09-12 RX ORDER — TRAMADOL HYDROCHLORIDE 50 MG/1
50 TABLET ORAL EVERY 6 HOURS PRN
Qty: 120 TABLET | Refills: 0 | Status: SHIPPED | OUTPATIENT
Start: 2019-09-12 | End: 2019-10-08 | Stop reason: SDUPTHER

## 2019-09-12 RX ORDER — GLUCOSAMINE HCL/CHONDROITIN SU 500-400 MG
CAPSULE ORAL
Qty: 100 STRIP | Refills: 0 | Status: SHIPPED | OUTPATIENT
Start: 2019-09-12 | End: 2019-11-25 | Stop reason: SDUPTHER

## 2019-09-12 RX ORDER — METOPROLOL TARTRATE 50 MG/1
TABLET, FILM COATED ORAL
Qty: 60 TABLET | Refills: 3 | Status: SHIPPED | OUTPATIENT
Start: 2019-09-12 | End: 2020-09-17

## 2019-09-12 NOTE — TELEPHONE ENCOUNTER
oarrs 8/14/19    Last visit:7/18/19    Last Med refill: 8/14/19    Does patient have enough medication for 72 hours:no    Next Visit Date:  Future Appointments   Date Time Provider Lane [place]   10/16/2019  2:00 PM Bryan Watson DPM [id number]   10/18/2019  3:30 PM Erika Johnston  [address] [facility]   Topic Date Due    Hepatitis C screen  1960    Diabetic retinal exam  08/27/1970    HIV screen  08/27/1975    Hepatitis B Vaccine (1 of 3 - Risk 3-dose series) 08/27/1979    DTaP/Tdap/Td vaccine (1 - Tdap) 08/27/1979    Shingles Vaccine (1 of 2) 08/27/2010    Colon cancer screen colonoscopy  02/08/2017    Lipid screen  10/16/2018    Flu vaccine (1) 09/01/2019    Potassium monitoring  11/17/2019    Creatinine monitoring  11/17/2019    Diabetic microalbuminuria test  03/20/2020    A1C test (Diabetic or Prediabetic)  07/18/2020    Diabetic foot exam  08/26/2020    Breast cancer screen  04/16/2021    Pneumococcal 0-64 years Vaccine  Completed       Hemoglobin A1C (%)   Date Value   07/18/2019 6.4   03/20/2019 6.8   12/03/2018 9.5             ( goal A1C is < 7)   No results found for: LABMICR  LDL Calculated (mg/dL)   Date Value   10/16/2017 76   12/07/2016 79       (goal LDL is <100)   AST (IU/L)   Date Value   11/17/2018 19     ALT (IU/L)   Date Value   11/17/2018 22     BUN (mg/dL)   Date Value   11/17/2018 19     BP Readings from Last 3 Encounters:   07/18/19 136/80   04/16/19 (!) 142/92   03/20/19 132/80          (goal 120/80)    All Future Testing planned in [facility]  Lab Frequency Next Occurrence   CBC Auto Differential Once 10/13/2018   Comprehensive Metabolic Panel Once 44/11/8305   Lipid Panel Once 10/13/2018   Hemoglobin A1C Once 10/13/2018   XR CHEST STANDARD (2 VW) Once 09/13/2018   XR HIP RIGHT (2-3 VIEWS) Once 12/17/2018               Patient Active Problem List:     S/P REID-BSO     Osteoarthritis     Hypertension     Chronic headaches     Lumbar

## 2019-09-27 RX ORDER — CEPHALEXIN 500 MG/1
CAPSULE ORAL
Qty: 30 CAPSULE | Refills: 0 | Status: SHIPPED | OUTPATIENT
Start: 2019-09-27 | End: 2019-10-16 | Stop reason: SDUPTHER

## 2019-10-08 DIAGNOSIS — M48.062 LUMBAR STENOSIS WITH NEUROGENIC CLAUDICATION: ICD-10-CM

## 2019-10-08 DIAGNOSIS — M15.9 PRIMARY OSTEOARTHRITIS INVOLVING MULTIPLE JOINTS: ICD-10-CM

## 2019-10-08 DIAGNOSIS — L97.911 ULCER OF RIGHT LOWER EXTREMITY, LIMITED TO BREAKDOWN OF SKIN (HCC): ICD-10-CM

## 2019-10-08 RX ORDER — TRAMADOL HYDROCHLORIDE 50 MG/1
50 TABLET ORAL EVERY 6 HOURS PRN
Qty: 120 TABLET | Refills: 0 | Status: SHIPPED | OUTPATIENT
Start: 2019-10-08 | End: 2019-11-08 | Stop reason: SDUPTHER

## 2019-10-08 RX ORDER — FLUTICASONE PROPIONATE 50 MCG
SPRAY, SUSPENSION (ML) NASAL
Qty: 1 BOTTLE | Refills: 0 | Status: SHIPPED | OUTPATIENT
Start: 2019-10-08 | End: 2019-12-06 | Stop reason: SDUPTHER

## 2019-10-16 RX ORDER — CEPHALEXIN 500 MG/1
CAPSULE ORAL
Qty: 30 CAPSULE | Refills: 0 | Status: SHIPPED | OUTPATIENT
Start: 2019-10-16 | End: 2019-11-04 | Stop reason: SDUPTHER

## 2019-11-04 RX ORDER — CEPHALEXIN 500 MG/1
CAPSULE ORAL
Qty: 30 CAPSULE | Refills: 0 | Status: SHIPPED | OUTPATIENT
Start: 2019-11-04 | End: 2019-11-25 | Stop reason: SDUPTHER

## 2019-11-08 ENCOUNTER — OFFICE VISIT (OUTPATIENT)
Dept: FAMILY MEDICINE CLINIC | Age: 59
End: 2019-11-08
Payer: MEDICARE

## 2019-11-08 VITALS
SYSTOLIC BLOOD PRESSURE: 126 MMHG | OXYGEN SATURATION: 97 % | BODY MASS INDEX: 45.98 KG/M2 | WEIGHT: 293 LBS | DIASTOLIC BLOOD PRESSURE: 84 MMHG | HEART RATE: 108 BPM

## 2019-11-08 DIAGNOSIS — M48.062 LUMBAR STENOSIS WITH NEUROGENIC CLAUDICATION: ICD-10-CM

## 2019-11-08 DIAGNOSIS — E11.9 TYPE 2 DIABETES MELLITUS WITHOUT COMPLICATION, WITH LONG-TERM CURRENT USE OF INSULIN (HCC): Primary | ICD-10-CM

## 2019-11-08 DIAGNOSIS — M15.9 PRIMARY OSTEOARTHRITIS INVOLVING MULTIPLE JOINTS: ICD-10-CM

## 2019-11-08 DIAGNOSIS — Z79.4 TYPE 2 DIABETES MELLITUS WITHOUT COMPLICATION, WITH LONG-TERM CURRENT USE OF INSULIN (HCC): Primary | ICD-10-CM

## 2019-11-08 DIAGNOSIS — E53.9 VITAMIN B DEFICIENCY: ICD-10-CM

## 2019-11-08 LAB — HBA1C MFR BLD: 6.9 %

## 2019-11-08 PROCEDURE — 3044F HG A1C LEVEL LT 7.0%: CPT | Performed by: FAMILY MEDICINE

## 2019-11-08 PROCEDURE — 83036 HEMOGLOBIN GLYCOSYLATED A1C: CPT | Performed by: FAMILY MEDICINE

## 2019-11-08 PROCEDURE — 99213 OFFICE O/P EST LOW 20 MIN: CPT | Performed by: FAMILY MEDICINE

## 2019-11-08 PROCEDURE — G8427 DOCREV CUR MEDS BY ELIG CLIN: HCPCS | Performed by: FAMILY MEDICINE

## 2019-11-08 PROCEDURE — G8417 CALC BMI ABV UP PARAM F/U: HCPCS | Performed by: FAMILY MEDICINE

## 2019-11-08 PROCEDURE — 4004F PT TOBACCO SCREEN RCVD TLK: CPT | Performed by: FAMILY MEDICINE

## 2019-11-08 PROCEDURE — 90471 IMMUNIZATION ADMIN: CPT | Performed by: FAMILY MEDICINE

## 2019-11-08 PROCEDURE — 90688 IIV4 VACCINE SPLT 0.5 ML IM: CPT | Performed by: FAMILY MEDICINE

## 2019-11-08 PROCEDURE — 2022F DILAT RTA XM EVC RTNOPTHY: CPT | Performed by: FAMILY MEDICINE

## 2019-11-08 PROCEDURE — 3017F COLORECTAL CA SCREEN DOC REV: CPT | Performed by: FAMILY MEDICINE

## 2019-11-08 PROCEDURE — G8482 FLU IMMUNIZE ORDER/ADMIN: HCPCS | Performed by: FAMILY MEDICINE

## 2019-11-08 RX ORDER — TRAMADOL HYDROCHLORIDE 50 MG/1
50 TABLET ORAL EVERY 6 HOURS PRN
Qty: 120 TABLET | Refills: 0 | Status: SHIPPED | OUTPATIENT
Start: 2019-11-08 | End: 2019-12-06 | Stop reason: SDUPTHER

## 2019-11-08 RX ORDER — CYANOCOBALAMIN 1000 UG/ML
1000 INJECTION INTRAMUSCULAR; SUBCUTANEOUS ONCE
Status: COMPLETED | OUTPATIENT
Start: 2019-11-08 | End: 2019-11-08

## 2019-11-08 RX ADMIN — CYANOCOBALAMIN 1000 MCG: 1000 INJECTION INTRAMUSCULAR; SUBCUTANEOUS at 14:20

## 2019-11-08 ASSESSMENT — ENCOUNTER SYMPTOMS
VOMITING: 0
DIARRHEA: 0
SINUS PRESSURE: 0
COUGH: 0
NAUSEA: 0
SORE THROAT: 0
SHORTNESS OF BREATH: 0
BACK PAIN: 0

## 2019-11-25 DIAGNOSIS — Z79.4 TYPE 2 DIABETES MELLITUS WITHOUT COMPLICATION, WITH LONG-TERM CURRENT USE OF INSULIN (HCC): ICD-10-CM

## 2019-11-25 DIAGNOSIS — E11.9 TYPE 2 DIABETES MELLITUS WITHOUT COMPLICATION, WITH LONG-TERM CURRENT USE OF INSULIN (HCC): ICD-10-CM

## 2019-11-25 DIAGNOSIS — E66.01 MORBID OBESITY DUE TO EXCESS CALORIES (HCC): ICD-10-CM

## 2019-11-25 RX ORDER — CEPHALEXIN 500 MG/1
CAPSULE ORAL
Qty: 30 CAPSULE | Refills: 0 | Status: SHIPPED | OUTPATIENT
Start: 2019-11-25 | End: 2019-12-19 | Stop reason: SDUPTHER

## 2019-11-25 RX ORDER — GLUCOSAMINE HCL/CHONDROITIN SU 500-400 MG
CAPSULE ORAL
Qty: 100 STRIP | Refills: 5 | Status: SHIPPED | OUTPATIENT
Start: 2019-11-25 | End: 2020-03-05 | Stop reason: SDUPTHER

## 2019-12-06 DIAGNOSIS — M48.062 LUMBAR STENOSIS WITH NEUROGENIC CLAUDICATION: ICD-10-CM

## 2019-12-06 DIAGNOSIS — M15.9 PRIMARY OSTEOARTHRITIS INVOLVING MULTIPLE JOINTS: ICD-10-CM

## 2019-12-06 RX ORDER — TRAMADOL HYDROCHLORIDE 50 MG/1
50 TABLET ORAL EVERY 6 HOURS PRN
Qty: 120 TABLET | Refills: 0 | Status: SHIPPED | OUTPATIENT
Start: 2019-12-06 | End: 2020-01-03

## 2019-12-06 RX ORDER — FLUTICASONE PROPIONATE 50 MCG
SPRAY, SUSPENSION (ML) NASAL
Qty: 1 BOTTLE | Refills: 0 | Status: SHIPPED | OUTPATIENT
Start: 2019-12-06 | End: 2020-01-06

## 2019-12-19 RX ORDER — CEPHALEXIN 500 MG/1
CAPSULE ORAL
Qty: 30 CAPSULE | Refills: 0 | Status: SHIPPED | OUTPATIENT
Start: 2019-12-19 | End: 2020-01-30

## 2020-01-03 RX ORDER — TRAMADOL HYDROCHLORIDE 50 MG/1
TABLET ORAL
Qty: 120 TABLET | Refills: 0 | Status: SHIPPED | OUTPATIENT
Start: 2020-01-03 | End: 2020-01-30

## 2020-01-06 RX ORDER — FLUTICASONE PROPIONATE 50 MCG
SPRAY, SUSPENSION (ML) NASAL
Qty: 1 BOTTLE | Refills: 2 | Status: SHIPPED | OUTPATIENT
Start: 2020-01-06 | End: 2020-04-24 | Stop reason: SDUPTHER

## 2020-01-30 RX ORDER — CEPHALEXIN 500 MG/1
CAPSULE ORAL
Qty: 30 CAPSULE | Refills: 0 | Status: SHIPPED | OUTPATIENT
Start: 2020-01-30 | End: 2020-02-27 | Stop reason: SDUPTHER

## 2020-01-30 RX ORDER — TRAMADOL HYDROCHLORIDE 50 MG/1
TABLET ORAL
Qty: 120 TABLET | Refills: 0 | Status: SHIPPED | OUTPATIENT
Start: 2020-01-30 | End: 2020-01-31 | Stop reason: SDUPTHER

## 2020-01-31 RX ORDER — TRAMADOL HYDROCHLORIDE 50 MG/1
50 TABLET ORAL EVERY 6 HOURS PRN
Qty: 120 TABLET | Refills: 0 | Status: SHIPPED | OUTPATIENT
Start: 2020-01-31 | End: 2020-02-27 | Stop reason: SDUPTHER

## 2020-02-27 RX ORDER — TRAMADOL HYDROCHLORIDE 50 MG/1
50 TABLET ORAL EVERY 6 HOURS PRN
Qty: 120 TABLET | Refills: 0 | Status: SHIPPED | OUTPATIENT
Start: 2020-02-27 | End: 2020-04-24 | Stop reason: SDUPTHER

## 2020-02-27 RX ORDER — CEPHALEXIN 500 MG/1
CAPSULE ORAL
Qty: 30 CAPSULE | Refills: 0 | Status: SHIPPED | OUTPATIENT
Start: 2020-02-27 | End: 2020-03-26 | Stop reason: SDUPTHER

## 2020-02-27 NOTE — TELEPHONE ENCOUNTER
Next Visit Date:  Future Appointments   Date Time Provider Lane [place]   3/2/2020  2:00 PM Angelica Justin  [address] [facility]   Topic Date Due    Hepatitis C screen  1960    Diabetic retinal exam  08/27/1970    DTaP/Tdap/Td vaccine (1 - Tdap) 08/27/1971    HIV screen  08/27/1975    Hepatitis B vaccine (1 of 3 - Risk 3-dose series) 08/27/1979    Shingles Vaccine (1 of 2) 08/27/2010    Colon cancer screen colonoscopy  02/08/2017    Lipid screen  10/16/2018    Potassium monitoring  11/17/2019    Creatinine monitoring  11/17/2019    Diabetic microalbuminuria test  03/20/2020    Diabetic foot exam  08/26/2020    A1C test (Diabetic or Prediabetic)  11/08/2020    Breast cancer screen  04/16/2021    Flu vaccine  Completed    Pneumococcal 0-64 years Vaccine  Completed    Hepatitis A vaccine  Aged Out    Hib vaccine  Aged Out    Meningococcal (ACWY) vaccine  Aged Out       Hemoglobin A1C (%)   Date Value   11/08/2019 6.9   07/18/2019 6.4   03/20/2019 6.8             ( goal A1C is < 7)   No results found for: LABMICR  LDL Calculated (mg/dL)   Date Value   10/16/2017 76   12/07/2016 79       (goal LDL is <100)   AST (IU/L)   Date Value   11/17/2018 19     ALT (IU/L)   Date Value   11/17/2018 22     BUN (mg/dL)   Date Value   11/17/2018 19     BP Readings from Last 3 Encounters:   11/08/19 126/84   07/18/19 136/80   04/16/19 (!) 142/92          (goal 120/80)    All Future Testing planned in [facility]  Lab Frequency Next Occurrence               Patient Active Problem List:     S/P REID-BSO     Osteoarthritis     Hypertension     Chronic headaches     Lumbar stenosis with neurogenic claudication     Morbid obesity due to excess calories (HCC)     Colon polyps     Major depressive disorder, recurrent episode, severe with mixed features ([address]     Fibromyalgia     Type 2 diabetes mellitus without complication, with long-term current use of insulin ([address]

## 2020-03-02 ENCOUNTER — OFFICE VISIT (OUTPATIENT)
Dept: FAMILY MEDICINE CLINIC | Age: 60
End: 2020-03-02
Payer: MEDICARE

## 2020-03-02 VITALS
HEART RATE: 92 BPM | DIASTOLIC BLOOD PRESSURE: 78 MMHG | SYSTOLIC BLOOD PRESSURE: 124 MMHG | HEIGHT: 68 IN | WEIGHT: 293 LBS | BODY MASS INDEX: 44.41 KG/M2 | OXYGEN SATURATION: 98 %

## 2020-03-02 PROCEDURE — 3017F COLORECTAL CA SCREEN DOC REV: CPT | Performed by: FAMILY MEDICINE

## 2020-03-02 PROCEDURE — G8482 FLU IMMUNIZE ORDER/ADMIN: HCPCS | Performed by: FAMILY MEDICINE

## 2020-03-02 PROCEDURE — G8417 CALC BMI ABV UP PARAM F/U: HCPCS | Performed by: FAMILY MEDICINE

## 2020-03-02 PROCEDURE — 4004F PT TOBACCO SCREEN RCVD TLK: CPT | Performed by: FAMILY MEDICINE

## 2020-03-02 PROCEDURE — 2022F DILAT RTA XM EVC RTNOPTHY: CPT | Performed by: FAMILY MEDICINE

## 2020-03-02 PROCEDURE — 3046F HEMOGLOBIN A1C LEVEL >9.0%: CPT | Performed by: FAMILY MEDICINE

## 2020-03-02 PROCEDURE — G8427 DOCREV CUR MEDS BY ELIG CLIN: HCPCS | Performed by: FAMILY MEDICINE

## 2020-03-02 PROCEDURE — 99213 OFFICE O/P EST LOW 20 MIN: CPT | Performed by: FAMILY MEDICINE

## 2020-03-02 RX ORDER — CYANOCOBALAMIN 1000 UG/ML
1000 INJECTION INTRAMUSCULAR; SUBCUTANEOUS ONCE
Status: COMPLETED | OUTPATIENT
Start: 2020-03-02 | End: 2020-03-02

## 2020-03-02 RX ADMIN — CYANOCOBALAMIN 1000 MCG: 1000 INJECTION INTRAMUSCULAR; SUBCUTANEOUS at 14:39

## 2020-03-02 ASSESSMENT — ENCOUNTER SYMPTOMS
COUGH: 0
SORE THROAT: 0
NAUSEA: 0
SINUS PRESSURE: 0
SHORTNESS OF BREATH: 0
VOMITING: 0
BACK PAIN: 1
DIARRHEA: 0

## 2020-03-02 NOTE — PROGRESS NOTES
50 MG tablet TAKE ONE TABLET BY MOUTH TWICE A DAY (Patient not taking: Reported on 3/2/2020) 60 tablet 3    aspirin-acetaminophen-caffeine (EXCEDRIN MIGRAINE) 420-147-48 MG per tablet Take 2 tablets by mouth 2 times daily       No current facility-administered medications for this visit. No Known Allergies      Subjective:   Review of Systems   Constitutional: Positive for fatigue. Negative for chills, diaphoresis and fever. HENT: Negative for congestion, sinus pressure and sore throat. Eyes: Negative for visual disturbance. Respiratory: Negative for cough and shortness of breath. Cardiovascular: Negative for chest pain and leg swelling. Gastrointestinal: Negative for diarrhea, nausea and vomiting. Genitourinary: Negative for dysuria, menstrual problem, urgency and vaginal discharge. Musculoskeletal: Positive for arthralgias, back pain, gait problem and myalgias. Skin: Negative for rash. Neurological: Positive for dizziness. Negative for weakness, numbness and headaches. Psychiatric/Behavioral: Positive for decreased concentration and dysphoric mood. Negative for sleep disturbance.       :   /78   Pulse 92   Ht 5' 7.5\" (1.715 m)   Wt 293 lb 12.8 oz (133.3 kg)   SpO2 98%   BMI 45.34 kg/m²     Physical Exam  Constitutional:       General: She is not in acute distress. Appearance: She is well-developed. She is not diaphoretic. HENT:      Head: Normocephalic and atraumatic. Mouth/Throat:      Pharynx: No oropharyngeal exudate. Eyes:      General: No scleral icterus. Neck:      Musculoskeletal: Neck supple. Vascular: No carotid bruit. Cardiovascular:      Heart sounds: No murmur. No friction rub. No gallop. Pulmonary:      Effort: No respiratory distress. Breath sounds: No wheezing or rales. Chest:      Chest wall: No tenderness. Musculoskeletal:         General: Tenderness present. Right lower leg: No edema. Left lower leg: No edema. Lymphadenopathy:      Cervical: No cervical adenopathy. Skin:     Findings: No rash. Neurological:      Mental Status: She is alert and oriented to person, place, and time. Cranial Nerves: No cranial nerve deficit. Psychiatric:         Behavior: Behavior normal.         Thought Content: Thought content normal.         Judgment: Judgment normal.         Assessment:       Diagnosis Orders   1. Chronic vertigo  ALONSO - Roxie Dougherty MD, Otolaryngology, [facility]   2. Essential hypertension  CBC Auto Differential    Comprehensive Metabolic Panel    Lipid Panel   3. Type 2 diabetes mellitus without complication, with long-term current use of insulin ([facility])  Comprehensive Metabolic Panel    Lipid Panel    Hemoglobin A1C         Plan:      Return in about 3 months (around 6/2/2020).     Orders Placed This Encounter   Procedures    MRI BRAIN WO CONTRAST     Standing Status:   Future     Standing Expiration Date:   3/2/2021     Order Specific Question:   Reason for exam:     Answer:   chronic vertigo    CBC Auto Differential     Standing Status:   Future     Standing Expiration Date:   3/2/2021    Comprehensive Metabolic Panel     Standing Status:   Future     Standing Expiration Date:   3/2/2021    Lipid Panel     Standing Status:   Future     Standing Expiration Date:   3/2/2021     Order Specific Question:   Is Patient Fasting?/# of Hours     Answer:   12 hour fast    Hemoglobin A1C     Standing Status:   Future     Standing Expiration Date:   3/2/2021   Deidra Ellis MD, Otolaryngology, [facility]     Referral Priority:   Routine     Referral Type:   Eval and Treat     Referral Reason:   Specialty Services Required     Referred to Provider:   Alfa Barragan MD     Requested Specialty:   Otolaryngology     Number of Visits Requested:   1     Orders Placed This Encounter   Medications    cyanocobalamin injection 1,000 mcg       Consults per pt request.  B12 requested as this has

## 2020-03-05 ENCOUNTER — TELEPHONE (OUTPATIENT)
Dept: FAMILY MEDICINE CLINIC | Age: 60
End: 2020-03-05

## 2020-03-05 RX ORDER — GLUCOSAMINE HCL/CHONDROITIN SU 500-400 MG
CAPSULE ORAL
Qty: 100 STRIP | Refills: 5 | Status: SHIPPED | OUTPATIENT
Start: 2020-03-05 | End: 2020-09-17 | Stop reason: SDUPTHER

## 2020-03-05 NOTE — TELEPHONE ENCOUNTER
Pt received call from Dr Gemma Narayanan office stating that they do not accept her insurance. Who would you like us to refer her too?

## 2020-03-14 ENCOUNTER — HOSPITAL ENCOUNTER (OUTPATIENT)
Dept: MRI IMAGING | Age: 60
Discharge: HOME | End: 2020-03-16
Payer: MEDICARE

## 2020-03-14 PROCEDURE — 70551 MRI BRAIN STEM W/O DYE: CPT

## 2020-03-26 RX ORDER — CEPHALEXIN 500 MG/1
CAPSULE ORAL
Qty: 30 CAPSULE | Refills: 0 | Status: SHIPPED | OUTPATIENT
Start: 2020-03-26 | End: 2020-04-24 | Stop reason: SDUPTHER

## 2020-03-26 NOTE — TELEPHONE ENCOUNTER
Next Visit Date:  Future Appointments   Date Time Provider Lane Leonela   6/3/2020  2:00 PM Poly Medina  [address] [facility]   Topic Date Due    Hepatitis C screen  1960    Diabetic retinal exam  08/27/1970    HIV screen  08/27/1975    Hepatitis B vaccine (1 of 3 - Risk 3-dose series) 08/27/1979    DTaP/Tdap/Td vaccine (1 - Tdap) 08/27/1979    Shingles Vaccine (1 of 2) 08/27/2010    Colon cancer screen colonoscopy  02/08/2017    Lipid screen  10/16/2018    Potassium monitoring  11/17/2019    Creatinine monitoring  11/17/2019    Diabetic microalbuminuria test  03/20/2020    Diabetic foot exam  08/26/2020    A1C test (Diabetic or Prediabetic)  11/08/2020    Breast cancer screen  04/16/2021    Flu vaccine  Completed    Pneumococcal 0-64 years Vaccine  Completed    Hepatitis A vaccine  Aged Out    Hib vaccine  Aged Out    Meningococcal (ACWY) vaccine  Aged Out       Hemoglobin A1C (%)   Date Value   11/08/2019 6.9   07/18/2019 6.4   03/20/2019 6.8             ( goal A1C is < 7)   No results found for: LABMICR  LDL Calculated (mg/dL)   Date Value   10/16/2017 76   12/07/2016 79       (goal LDL is <100)   AST (IU/L)   Date Value   11/17/2018 19     ALT (IU/L)   Date Value   11/17/2018 22     BUN (mg/dL)   Date Value   11/17/2018 19     BP Readings from Last 3 Encounters:   03/02/20 124/78   11/08/19 126/84   07/18/19 136/80          (goal 120/80)    All Future Testing planned in [facility]  Lab Frequency Next Occurrence   CBC Auto Differential Once 03/02/2020   Comprehensive Metabolic Panel Once 09/53/9819   Lipid Panel Once 03/02/2020   Hemoglobin A1C Once 03/02/2020               Patient Active Problem List:     S/P REID-BSO     Osteoarthritis     Hypertension     Chronic headaches     Lumbar stenosis with neurogenic claudication     Morbid obesity due to excess calories (HCC)     Colon polyps     Major depressive disorder, recurrent episode, severe with mixed features ([facility]     Fibromyalgia     Type 2 diabetes mellitus without complication, with long-term current use of insulin ([facility]

## 2020-04-24 RX ORDER — INSULIN GLARGINE 100 [IU]/ML
15 INJECTION, SOLUTION SUBCUTANEOUS 2 TIMES DAILY
Qty: 5 PEN | Refills: 3 | Status: SHIPPED | OUTPATIENT
Start: 2020-04-24 | End: 2020-07-17 | Stop reason: SDUPTHER

## 2020-04-24 RX ORDER — CEPHALEXIN 500 MG/1
CAPSULE ORAL
Qty: 30 CAPSULE | Refills: 0 | Status: SHIPPED | OUTPATIENT
Start: 2020-04-24 | End: 2020-05-26

## 2020-04-24 RX ORDER — FLUTICASONE PROPIONATE 50 MCG
SPRAY, SUSPENSION (ML) NASAL
Qty: 1 BOTTLE | Refills: 2 | Status: SHIPPED | OUTPATIENT
Start: 2020-04-24 | End: 2020-07-21 | Stop reason: SDUPTHER

## 2020-04-24 RX ORDER — TRAMADOL HYDROCHLORIDE 50 MG/1
50 TABLET ORAL EVERY 6 HOURS PRN
Qty: 120 TABLET | Refills: 0 | Status: SHIPPED | OUTPATIENT
Start: 2020-04-24 | End: 2020-05-26

## 2020-04-24 NOTE — TELEPHONE ENCOUNTER
features ([facility]     Fibromyalgia     Type 2 diabetes mellitus without complication, with long-term current use of insulin ([facility]

## 2020-05-26 RX ORDER — TRAMADOL HYDROCHLORIDE 50 MG/1
TABLET ORAL
Qty: 120 TABLET | Refills: 0 | Status: SHIPPED | OUTPATIENT
Start: 2020-05-26 | End: 2020-06-24 | Stop reason: SDUPTHER

## 2020-05-26 RX ORDER — CEPHALEXIN 500 MG/1
CAPSULE ORAL
Qty: 30 CAPSULE | Refills: 0 | Status: SHIPPED | OUTPATIENT
Start: 2020-05-26 | End: 2020-06-24 | Stop reason: SDUPTHER

## 2020-05-26 NOTE — TELEPHONE ENCOUNTER
Last visit: 3/2/20  Last Med refill: 2/27/20      Next Visit Date:  Future Appointments   Date Time Provider Lane Leonela   6/3/2020  2:00 PM Autry Lesch,  [address] [facility]   Topic Date Due    Hepatitis C screen  1960    Diabetic retinal exam  08/27/1970    HIV screen  08/27/1975    Hepatitis B vaccine (1 of 3 - Risk 3-dose series) 08/27/1979    DTaP/Tdap/Td vaccine (1 - Tdap) 08/27/1979    Shingles Vaccine (1 of 2) 08/27/2010    Colon cancer screen colonoscopy  02/08/2017    Lipid screen  10/16/2018    Potassium monitoring  11/17/2019    Creatinine monitoring  11/17/2019    Diabetic microalbuminuria test  03/20/2020    Diabetic foot exam  08/26/2020    A1C test (Diabetic or Prediabetic)  11/08/2020    Breast cancer screen  04/16/2021    Flu vaccine  Completed    Pneumococcal 0-64 years Vaccine  Completed    Hepatitis A vaccine  Aged Out    Hib vaccine  Aged Out    Meningococcal (ACWY) vaccine  Aged Out       Hemoglobin A1C (%)   Date Value   11/08/2019 6.9   07/18/2019 6.4   03/20/2019 6.8             ( goal A1C is < 7)   No results found for: LABMICR  LDL Calculated (mg/dL)   Date Value   10/16/2017 76   12/07/2016 79       (goal LDL is <100)   AST (IU/L)   Date Value   11/17/2018 19     ALT (IU/L)   Date Value   11/17/2018 22     BUN (mg/dL)   Date Value   11/17/2018 19     BP Readings from Last 3 Encounters:   03/02/20 124/78   11/08/19 126/84   07/18/19 136/80          (goal 120/80)    All Future Testing planned in [facility]  Lab Frequency Next Occurrence   CBC Auto Differential Once 03/02/2021   Comprehensive Metabolic Panel Once 17/32/2800   Lipid Panel Once 03/02/2021   Hemoglobin A1C Once 03/02/2021               Patient Active Problem List:     S/P REID-BSO     Osteoarthritis     Hypertension     Chronic headaches     Lumbar stenosis with neurogenic claudication     Morbid obesity due to excess calories (HCC)     Colon polyps     Major depressive disorder, recurrent episode, severe with mixed features ([facility]     Fibromyalgia     Type 2 diabetes mellitus without complication, with long-term current use of insulin ([facility]

## 2020-05-29 ENCOUNTER — TELEPHONE (OUTPATIENT)
Dept: FAMILY MEDICINE CLINIC | Age: 60
End: 2020-05-29

## 2020-05-29 NOTE — TELEPHONE ENCOUNTER
features ([facility]     Fibromyalgia     Type 2 diabetes mellitus without complication, with long-term current use of insulin ([facility]

## 2020-06-05 ENCOUNTER — OFFICE VISIT (OUTPATIENT)
Dept: FAMILY MEDICINE CLINIC | Age: 60
End: 2020-06-05
Payer: MEDICARE

## 2020-06-05 VITALS
SYSTOLIC BLOOD PRESSURE: 148 MMHG | BODY MASS INDEX: 44.75 KG/M2 | HEART RATE: 116 BPM | TEMPERATURE: 97 F | WEIGHT: 290 LBS | DIASTOLIC BLOOD PRESSURE: 98 MMHG | OXYGEN SATURATION: 96 %

## 2020-06-05 PROCEDURE — 3017F COLORECTAL CA SCREEN DOC REV: CPT | Performed by: FAMILY MEDICINE

## 2020-06-05 PROCEDURE — 3046F HEMOGLOBIN A1C LEVEL >9.0%: CPT | Performed by: FAMILY MEDICINE

## 2020-06-05 PROCEDURE — 2022F DILAT RTA XM EVC RTNOPTHY: CPT | Performed by: FAMILY MEDICINE

## 2020-06-05 PROCEDURE — 4004F PT TOBACCO SCREEN RCVD TLK: CPT | Performed by: FAMILY MEDICINE

## 2020-06-05 PROCEDURE — G8427 DOCREV CUR MEDS BY ELIG CLIN: HCPCS | Performed by: FAMILY MEDICINE

## 2020-06-05 PROCEDURE — G8417 CALC BMI ABV UP PARAM F/U: HCPCS | Performed by: FAMILY MEDICINE

## 2020-06-05 PROCEDURE — 99213 OFFICE O/P EST LOW 20 MIN: CPT | Performed by: FAMILY MEDICINE

## 2020-06-05 SDOH — ECONOMIC STABILITY: FOOD INSECURITY: WITHIN THE PAST 12 MONTHS, THE FOOD YOU BOUGHT JUST DIDN'T LAST AND YOU DIDN'T HAVE MONEY TO GET MORE.: PATIENT DECLINED

## 2020-06-05 SDOH — ECONOMIC STABILITY: TRANSPORTATION INSECURITY
IN THE PAST 12 MONTHS, HAS THE LACK OF TRANSPORTATION KEPT YOU FROM MEDICAL APPOINTMENTS OR FROM GETTING MEDICATIONS?: PATIENT DECLINED

## 2020-06-05 SDOH — ECONOMIC STABILITY: FOOD INSECURITY: WITHIN THE PAST 12 MONTHS, YOU WORRIED THAT YOUR FOOD WOULD RUN OUT BEFORE YOU GOT MONEY TO BUY MORE.: PATIENT DECLINED

## 2020-06-05 SDOH — ECONOMIC STABILITY: TRANSPORTATION INSECURITY
IN THE PAST 12 MONTHS, HAS LACK OF TRANSPORTATION KEPT YOU FROM MEETINGS, WORK, OR FROM GETTING THINGS NEEDED FOR DAILY LIVING?: PATIENT DECLINED

## 2020-06-05 SDOH — ECONOMIC STABILITY: INCOME INSECURITY: HOW HARD IS IT FOR YOU TO PAY FOR THE VERY BASICS LIKE FOOD, HOUSING, MEDICAL CARE, AND HEATING?: PATIENT DECLINED

## 2020-06-05 ASSESSMENT — ENCOUNTER SYMPTOMS
SINUS PRESSURE: 0
VOMITING: 0
COUGH: 0
DIARRHEA: 0
SORE THROAT: 0
SHORTNESS OF BREATH: 0
BACK PAIN: 1
NAUSEA: 0

## 2020-06-05 NOTE — PROGRESS NOTES
100 each 3    lisinopril-hydrochlorothiazide (PRINZIDE;ZESTORETIC) 20-12.5 MG per tablet Take 1 tablet by mouth daily 30 tablet 5    aspirin-acetaminophen-caffeine (EXCEDRIN MIGRAINE) 250-250-65 MG per tablet Take 2 tablets by mouth 2 times daily       No current facility-administered medications for this visit. No Known Allergies      Subjective:   Review of Systems   Constitutional: Negative for chills, diaphoresis and fever. HENT: Negative for congestion, sinus pressure and sore throat. Eyes: Negative for visual disturbance. Respiratory: Negative for cough and shortness of breath. Cardiovascular: Negative for chest pain and leg swelling. Gastrointestinal: Negative for diarrhea, nausea and vomiting. Genitourinary: Negative for dysuria, menstrual problem, urgency and vaginal discharge. Musculoskeletal: Positive for back pain and gait problem. Negative for arthralgias and myalgias. Skin: Negative for rash. Neurological: Negative for weakness, numbness and headaches. Psychiatric/Behavioral: Negative for sleep disturbance.       :   BP (!) 148/98   Pulse 116   Temp 97 °F (36.1 °C) (Temporal)   Wt 290 lb (131.5 kg)   SpO2 96%   BMI 44.75 kg/m²     Physical Exam  Constitutional:       General: She is not in acute distress. Appearance: She is well-developed. She is not diaphoretic. HENT:      Head: Normocephalic and atraumatic. Mouth/Throat:      Pharynx: No oropharyngeal exudate. Eyes:      General: No scleral icterus. Neck:      Musculoskeletal: Neck supple. Vascular: No carotid bruit. Cardiovascular:      Heart sounds: No murmur. No friction rub. No gallop. Pulmonary:      Effort: No respiratory distress. Breath sounds: No wheezing or rales. Chest:      Chest wall: No tenderness. Lymphadenopathy:      Cervical: No cervical adenopathy. Skin:     Findings: No rash.    Neurological:      Mental Status: She is alert and oriented to person, place, and

## 2020-06-24 RX ORDER — TRAMADOL HYDROCHLORIDE 50 MG/1
50 TABLET ORAL EVERY 6 HOURS PRN
Qty: 120 TABLET | Refills: 0 | Status: SHIPPED | OUTPATIENT
Start: 2020-06-25 | End: 2020-07-21 | Stop reason: SDUPTHER

## 2020-06-24 RX ORDER — CEPHALEXIN 500 MG/1
CAPSULE ORAL
Qty: 30 CAPSULE | Refills: 0 | Status: SHIPPED | OUTPATIENT
Start: 2020-06-24 | End: 2020-07-21 | Stop reason: SDUPTHER

## 2020-07-17 RX ORDER — INSULIN GLARGINE 100 [IU]/ML
15 INJECTION, SOLUTION SUBCUTANEOUS 2 TIMES DAILY
Qty: 5 PEN | Refills: 3 | Status: SHIPPED | OUTPATIENT
Start: 2020-07-17 | End: 2021-02-05 | Stop reason: SDUPTHER

## 2020-07-17 NOTE — TELEPHONE ENCOUNTER
recurrent episode, severe with mixed features ([facility]     Fibromyalgia     Type 2 diabetes mellitus without complication, with long-term current use of insulin ([facility]

## 2020-07-21 RX ORDER — FLUTICASONE PROPIONATE 50 MCG
SPRAY, SUSPENSION (ML) NASAL
Qty: 1 BOTTLE | Refills: 2 | Status: SHIPPED | OUTPATIENT
Start: 2020-07-21 | End: 2020-09-17 | Stop reason: SDUPTHER

## 2020-07-21 RX ORDER — TRAMADOL HYDROCHLORIDE 50 MG/1
50 TABLET ORAL EVERY 6 HOURS PRN
Qty: 120 TABLET | Refills: 0 | Status: SHIPPED | OUTPATIENT
Start: 2020-07-23 | End: 2020-08-19

## 2020-07-21 RX ORDER — CEPHALEXIN 500 MG/1
CAPSULE ORAL
Qty: 30 CAPSULE | Refills: 0 | Status: SHIPPED | OUTPATIENT
Start: 2020-07-21 | End: 2020-11-19

## 2020-07-21 NOTE — TELEPHONE ENCOUNTER
Patient requesting refills    Last visit: 6-5-20  Last Med refill:   Does patient have enough medication for 48 hours: Yes    Next Visit Date:  Future Appointments   Date Time Provider Lane [place]   9/9/2020  3:30 PM Elisa Duran  [address] [facility]   Topic Date Due    Hepatitis C screen  1960    Diabetic retinal exam  08/27/1970    HIV screen  08/27/1975    Hepatitis B vaccine (1 of 3 - Risk 3-dose series) 08/27/1979    DTaP/Tdap/Td vaccine (1 - Tdap) 08/27/1979    Colon cancer screen colonoscopy  02/08/2017    Lipid screen  10/16/2018    Potassium monitoring  11/17/2019    Creatinine monitoring  11/17/2019    Diabetic microalbuminuria test  03/20/2020    Shingles Vaccine (1 of 2) 06/05/2021 (Originally 8/27/2010)    Diabetic foot exam  08/26/2020    Flu vaccine (1) 09/01/2020    A1C test (Diabetic or Prediabetic)  11/08/2020    Breast cancer screen  04/16/2021    Pneumococcal 0-64 years Vaccine  Completed    Hepatitis A vaccine  Aged Out    Hib vaccine  Aged Out    Meningococcal (ACWY) vaccine  Aged Out       Hemoglobin A1C (%)   Date Value   11/08/2019 6.9   07/18/2019 6.4   03/20/2019 6.8             ( goal A1C is < 7)   No results found for: LABMICR  LDL Calculated (mg/dL)   Date Value   10/16/2017 76   12/07/2016 79       (goal LDL is <100)   AST (IU/L)   Date Value   11/17/2018 19     ALT (IU/L)   Date Value   11/17/2018 22     BUN (mg/dL)   Date Value   11/17/2018 19     BP Readings from Last 3 Encounters:   06/05/20 (!) 148/98   03/02/20 124/78   11/08/19 126/84          (goal 120/80)    All Future Testing planned in [facility]  Lab Frequency Next Occurrence   CBC Auto Differential Once 03/02/2021   Comprehensive Metabolic Panel Once 88/58/3533   Lipid Panel Once 03/02/2021   Hemoglobin A1C Once 03/02/2021               Patient Active Problem List:     S/P REID-BSO     Osteoarthritis     Hypertension     Chronic headaches     Lumbar stenosis with neurogenic claudication     Morbid obesity due to excess calories (HCC)     Colon polyps     Major depressive disorder, recurrent episode, severe with mixed features ([facility]     Fibromyalgia     Type 2 diabetes mellitus without complication, with long-term current use of insulin ([facility]

## 2020-07-22 ENCOUNTER — TELEPHONE (OUTPATIENT)
Dept: FAMILY MEDICINE CLINIC | Age: 60
End: 2020-07-22

## 2020-07-23 NOTE — TELEPHONE ENCOUNTER
[facility] called to say that the basaglar is still not approved, covered options include levemir, lantus, or treciba. None of these need a prior auth.  Pt only has 1 injection left

## 2020-08-19 RX ORDER — CEPHALEXIN 500 MG/1
CAPSULE ORAL
Qty: 30 CAPSULE | Refills: 0 | OUTPATIENT
Start: 2020-08-19

## 2020-08-19 RX ORDER — TRAMADOL HYDROCHLORIDE 50 MG/1
TABLET ORAL
Qty: 120 TABLET | Refills: 0 | Status: SHIPPED | OUTPATIENT
Start: 2020-08-19 | End: 2020-09-17 | Stop reason: SDUPTHER

## 2020-08-28 ENCOUNTER — OFFICE VISIT (OUTPATIENT)
Dept: FAMILY MEDICINE CLINIC | Age: 60
End: 2020-08-28
Payer: MEDICARE

## 2020-08-28 VITALS
BODY MASS INDEX: 42.98 KG/M2 | HEART RATE: 67 BPM | TEMPERATURE: 97.4 F | SYSTOLIC BLOOD PRESSURE: 126 MMHG | DIASTOLIC BLOOD PRESSURE: 78 MMHG | HEIGHT: 68 IN | OXYGEN SATURATION: 98 % | WEIGHT: 283.6 LBS

## 2020-08-28 PROCEDURE — 3017F COLORECTAL CA SCREEN DOC REV: CPT | Performed by: FAMILY MEDICINE

## 2020-08-28 PROCEDURE — G8427 DOCREV CUR MEDS BY ELIG CLIN: HCPCS | Performed by: FAMILY MEDICINE

## 2020-08-28 PROCEDURE — 99213 OFFICE O/P EST LOW 20 MIN: CPT | Performed by: FAMILY MEDICINE

## 2020-08-28 PROCEDURE — 4004F PT TOBACCO SCREEN RCVD TLK: CPT | Performed by: FAMILY MEDICINE

## 2020-08-28 PROCEDURE — G8417 CALC BMI ABV UP PARAM F/U: HCPCS | Performed by: FAMILY MEDICINE

## 2020-08-28 ASSESSMENT — ENCOUNTER SYMPTOMS
COUGH: 0
DIARRHEA: 0
BACK PAIN: 0
VOMITING: 0
SHORTNESS OF BREATH: 0
NAUSEA: 0
SORE THROAT: 0
SINUS PRESSURE: 0

## 2020-08-28 ASSESSMENT — PATIENT HEALTH QUESTIONNAIRE - PHQ9
SUM OF ALL RESPONSES TO PHQ QUESTIONS 1-9: 0
2. FEELING DOWN, DEPRESSED OR HOPELESS: 0
SUM OF ALL RESPONSES TO PHQ9 QUESTIONS 1 & 2: 0
SUM OF ALL RESPONSES TO PHQ QUESTIONS 1-9: 0
1. LITTLE INTEREST OR PLEASURE IN DOING THINGS: 0

## 2020-08-28 NOTE — PROGRESS NOTES
Kalyn Serrato is a 61 y.o. female who presents todayfor her medical conditions/complaints as noted below. Kalyn Serrato is here today c/Jw Pain (left)      :     HPI    Follow up on her recurrent leg infections. Currently improved but on ab. She describes severe pruritis of both legs that she can not avoid itching. No past medical history on file. Past Surgical History:   Procedure Laterality Date    COLONOSCOPY  06/2016    poor prep, there appears to be some polyps in the colon mixed with stool, could not be removed     No family history on file. Social History     Tobacco Use    Smoking status: Current Every Day Smoker     Packs/day: 0.50     Years: 30.00     Pack years: 15.00     Types: Cigarettes    Smokeless tobacco: Never Used   Substance Use Topics    Alcohol use: No      Current Outpatient Medications   Medication Sig Dispense Refill    fluocinonide (LIDEX) 0.05 % cream Apply topically 2 times daily. 120 g 2    traMADol (ULTRAM) 50 MG tablet TAKE ONE TABLET BY MOUTH EVERY 6 HOURS AS NEEDED FOR PAIN FOR UP TO 30 DAYS **REDUCE DOSES TAKEN AS PAIN BECOMES MANAGEABLE** 120 tablet 0    cephALEXin (KEFLEX) 500 MG capsule One cap three times a day 30 capsule 0    fluticasone (FLONASE) 50 MCG/ACT nasal spray SPRAY TWO SPRAYS IN EACH NOSTRIL ONCE DAILY 1 Bottle 2    insulin glargine (BASAGLAR KWIKPEN) 100 UNIT/ML injection pen Inject 15 Units into the skin 2 times daily 5 pen 3    mupirocin (BACTROBAN) 2 % ointment AFFECTED AREA(S) THREE TIMES A DAY 15 g 2    sertraline (ZOLOFT) 50 MG tablet Take 1 tablet by mouth daily Take 1 1/2 tablets daily 45 tablet 3    blood glucose monitor strips Test 3  times a day as needed for symptoms of irregular blood glucose. Has true metrix meter. 100 strip 5    sertraline (ZOLOFT) 50 MG tablet Take 1/2 pill a day for six days then 1 pill a day thereafter.  30 tablet 5    metoprolol tartrate (LOPRESSOR) 50 MG tablet TAKE ONE TABLET BY MOUTH TWICE A DAY 60 tablet 3    ULTICARE MICRO PEN NEEDLES 32G X 4 MM MISC Inject 100 each into the skin 2 times daily 100 each 3    lisinopril-hydrochlorothiazide (PRINZIDE;ZESTORETIC) 20-12.5 MG per tablet Take 1 tablet by mouth daily 30 tablet 5    aspirin-acetaminophen-caffeine (EXCEDRIN MIGRAINE) 250-250-65 MG per tablet Take 2 tablets by mouth 2 times daily       No current facility-administered medications for this visit. No Known Allergies      Subjective:   Review of Systems   Constitutional: Negative for chills, diaphoresis and fever. HENT: Negative for congestion, sinus pressure and sore throat. Eyes: Negative for visual disturbance. Respiratory: Negative for cough and shortness of breath. Cardiovascular: Negative for chest pain and leg swelling. Gastrointestinal: Negative for diarrhea, nausea and vomiting. Genitourinary: Negative for dysuria, menstrual problem, urgency and vaginal discharge. Musculoskeletal: Positive for gait problem. Negative for arthralgias, back pain and myalgias. Skin: Positive for rash and wound. Neurological: Negative for weakness, numbness and headaches. Psychiatric/Behavioral: Negative for sleep disturbance.       :   /78   Pulse 67   Temp 97.4 °F (36.3 °C)   Ht 5' 7.52\" (1.715 m)   Wt 283 lb 9.6 oz (128.6 kg)   SpO2 98%   BMI 43.74 kg/m²     Physical Exam  Constitutional:       General: She is not in acute distress. Appearance: She is well-developed. She is not diaphoretic. HENT:      Head: Normocephalic and atraumatic. Mouth/Throat:      Pharynx: No oropharyngeal exudate. Eyes:      General: No scleral icterus. Neck:      Musculoskeletal: Neck supple. Vascular: No carotid bruit. Cardiovascular:      Heart sounds: No murmur. No friction rub. No gallop. Pulmonary:      Effort: No respiratory distress. Breath sounds: No wheezing or rales. Chest:      Chest wall: No tenderness.    Lymphadenopathy:      Cervical: No cervical adenopathy. Skin:     Findings: Bruising (legs are scarred areas where epidermis gone from trauma. No infection at this time.) present. No erythema or rash. Neurological:      Mental Status: She is alert and oriented to person, place, and time. Cranial Nerves: No cranial nerve deficit. Psychiatric:         Behavior: Behavior normal.         Thought Content: Thought content normal.         Judgment: Judgment normal.         Assessment:       Diagnosis Orders   1. Neurodermatitis     2. Major depressive disorder, recurrent episode, severe with mixed features ([facility]     3. Morbid obesity due to excess calories ([facility]           Plan:      Return if symptoms worsen or fail to improve. No orders of the defined types were placed in this encounter. Orders Placed This Encounter   Medications    fluocinonide (LIDEX) 0.05 % cream     Sig: Apply topically 2 times daily. Dispense:  120 g     Refill:  2     I stressed need to try to keep her off the oral ab. Will try topical steroid to control her itching and bactroban to try and avoid using oral antibiotics.   Will consider Derm consult but will try above first.

## 2020-09-17 RX ORDER — GLUCOSAMINE HCL/CHONDROITIN SU 500-400 MG
CAPSULE ORAL
Qty: 100 STRIP | Refills: 5 | Status: SHIPPED | OUTPATIENT
Start: 2020-09-17 | End: 2021-01-04 | Stop reason: SDUPTHER

## 2020-09-17 RX ORDER — METOPROLOL TARTRATE 50 MG/1
TABLET, FILM COATED ORAL
Qty: 60 TABLET | Refills: 1 | Status: SHIPPED | OUTPATIENT
Start: 2020-09-17 | End: 2020-11-19

## 2020-09-17 RX ORDER — FLUTICASONE PROPIONATE 50 MCG
SPRAY, SUSPENSION (ML) NASAL
Qty: 1 BOTTLE | Refills: 2 | Status: SHIPPED | OUTPATIENT
Start: 2020-09-17 | End: 2020-10-15 | Stop reason: SDUPTHER

## 2020-09-17 RX ORDER — TRAMADOL HYDROCHLORIDE 50 MG/1
50 TABLET ORAL EVERY 6 HOURS PRN
Qty: 120 TABLET | Refills: 0 | Status: SHIPPED | OUTPATIENT
Start: 2020-09-17 | End: 2020-10-15 | Stop reason: SDUPTHER

## 2020-09-17 NOTE — TELEPHONE ENCOUNTER
Zaheer Calvillo is calling to request a refill on the following medication(s):    Medication Request:  Requested Prescriptions     Pending Prescriptions Disp Refills    traMADol (ULTRAM) 50 MG tablet 120 tablet 0     Sig: Take 1 tablet by mouth every 6 hours as needed for Pain for up to 30 days.  fluticasone (FLONASE) 50 MCG/ACT nasal spray 1 Bottle 2     Sig: SPRAY TWO SPRAYS IN EACH NOSTRIL ONCE DAILY    blood glucose monitor strips 100 strip 5     Sig: Test 3  times a day as needed for symptoms of irregular blood glucose. Has true metrix meter.        Last Visit Date (If Applicable):  2/24/0710    Next Visit Date:    Visit date not found

## 2020-10-15 RX ORDER — TRAMADOL HYDROCHLORIDE 50 MG/1
50 TABLET ORAL EVERY 6 HOURS PRN
Qty: 120 TABLET | Refills: 0 | Status: SHIPPED | OUTPATIENT
Start: 2020-10-15 | End: 2020-11-12 | Stop reason: SDUPTHER

## 2020-10-15 RX ORDER — FLUTICASONE PROPIONATE 50 MCG
SPRAY, SUSPENSION (ML) NASAL
Qty: 1 BOTTLE | Refills: 2 | Status: SHIPPED | OUTPATIENT
Start: 2020-10-15 | End: 2020-11-12 | Stop reason: SDUPTHER

## 2020-11-12 RX ORDER — FLUTICASONE PROPIONATE 50 MCG
SPRAY, SUSPENSION (ML) NASAL
Qty: 1 BOTTLE | Refills: 2 | Status: SHIPPED | OUTPATIENT
Start: 2020-11-12 | End: 2021-01-04 | Stop reason: SDUPTHER

## 2020-11-12 RX ORDER — TRAMADOL HYDROCHLORIDE 50 MG/1
50 TABLET ORAL EVERY 6 HOURS PRN
Qty: 120 TABLET | Refills: 0 | Status: SHIPPED | OUTPATIENT
Start: 2020-11-12 | End: 2020-12-12

## 2020-11-19 ENCOUNTER — OFFICE VISIT (OUTPATIENT)
Dept: FAMILY MEDICINE CLINIC | Age: 60
End: 2020-11-19
Payer: MEDICARE

## 2020-11-19 VITALS
OXYGEN SATURATION: 96 % | DIASTOLIC BLOOD PRESSURE: 80 MMHG | HEIGHT: 68 IN | HEART RATE: 103 BPM | SYSTOLIC BLOOD PRESSURE: 124 MMHG | TEMPERATURE: 96.6 F | BODY MASS INDEX: 42.89 KG/M2 | WEIGHT: 283 LBS

## 2020-11-19 PROCEDURE — 90686 IIV4 VACC NO PRSV 0.5 ML IM: CPT | Performed by: FAMILY MEDICINE

## 2020-11-19 PROCEDURE — G8427 DOCREV CUR MEDS BY ELIG CLIN: HCPCS | Performed by: FAMILY MEDICINE

## 2020-11-19 PROCEDURE — 4004F PT TOBACCO SCREEN RCVD TLK: CPT | Performed by: FAMILY MEDICINE

## 2020-11-19 PROCEDURE — 90471 IMMUNIZATION ADMIN: CPT | Performed by: FAMILY MEDICINE

## 2020-11-19 PROCEDURE — 99213 OFFICE O/P EST LOW 20 MIN: CPT | Performed by: FAMILY MEDICINE

## 2020-11-19 PROCEDURE — G8417 CALC BMI ABV UP PARAM F/U: HCPCS | Performed by: FAMILY MEDICINE

## 2020-11-19 PROCEDURE — G8482 FLU IMMUNIZE ORDER/ADMIN: HCPCS | Performed by: FAMILY MEDICINE

## 2020-11-19 PROCEDURE — 3017F COLORECTAL CA SCREEN DOC REV: CPT | Performed by: FAMILY MEDICINE

## 2020-11-19 RX ORDER — CYANOCOBALAMIN 1000 UG/ML
1000 INJECTION INTRAMUSCULAR; SUBCUTANEOUS ONCE
Status: COMPLETED | OUTPATIENT
Start: 2020-11-19 | End: 2020-11-19

## 2020-11-19 RX ORDER — TRAMADOL HYDROCHLORIDE 50 MG/1
50 TABLET ORAL EVERY 6 HOURS PRN
Qty: 120 TABLET | Refills: 0 | Status: SHIPPED | OUTPATIENT
Start: 2020-12-11 | End: 2021-01-07 | Stop reason: SDUPTHER

## 2020-11-19 RX ORDER — CEPHALEXIN 500 MG/1
500 CAPSULE ORAL 3 TIMES DAILY
Qty: 30 CAPSULE | Refills: 1 | Status: SHIPPED | OUTPATIENT
Start: 2020-11-19 | End: 2020-12-09

## 2020-11-19 RX ADMIN — CYANOCOBALAMIN 1000 MCG: 1000 INJECTION INTRAMUSCULAR; SUBCUTANEOUS at 16:06

## 2020-11-19 ASSESSMENT — ENCOUNTER SYMPTOMS
NAUSEA: 0
SINUS PRESSURE: 0
VOMITING: 0
SORE THROAT: 0
COUGH: 0
DIARRHEA: 0
BACK PAIN: 0
SHORTNESS OF BREATH: 0

## 2020-11-19 ASSESSMENT — PATIENT HEALTH QUESTIONNAIRE - PHQ9
SUM OF ALL RESPONSES TO PHQ9 QUESTIONS 1 & 2: 0
1. LITTLE INTEREST OR PLEASURE IN DOING THINGS: 0
2. FEELING DOWN, DEPRESSED OR HOPELESS: 0
SUM OF ALL RESPONSES TO PHQ QUESTIONS 1-9: 0

## 2020-11-19 NOTE — PROGRESS NOTES
Krystal Martinez is a 61 y.o. female who presents todayfor her medical conditions/complaints as noted below. Krystal Martinez is here today c/oCheck-Up and Other (discuss new provider)      : HPI    Legs are flaring up again and wants to restart the keflex which we stopped. Pain med Rx and flu shot. No past medical history on file. Past Surgical History:   Procedure Laterality Date    COLONOSCOPY  06/2016    poor prep, there appears to be some polyps in the colon mixed with stool, could not be removed     No family history on file. Social History     Tobacco Use    Smoking status: Current Every Day Smoker     Packs/day: 0.50     Years: 30.00     Pack years: 15.00     Types: Cigarettes    Smokeless tobacco: Never Used   Substance Use Topics    Alcohol use: No      Current Outpatient Medications   Medication Sig Dispense Refill    [START ON 12/11/2020] traMADol (ULTRAM) 50 MG tablet Take 1 tablet by mouth every 6 hours as needed for Pain for up to 30 days. 120 tablet 0    cephALEXin (KEFLEX) 500 MG capsule Take 1 capsule by mouth 3 times daily for 20 days 30 capsule 1    fluticasone (FLONASE) 50 MCG/ACT nasal spray SPRAY TWO SPRAYS IN EACH NOSTRIL ONCE DAILY 1 Bottle 2    fluocinonide (LIDEX) 0.05 % cream Apply topically 2 times daily. 120 g 2    insulin glargine (BASAGLAR KWIKPEN) 100 UNIT/ML injection pen Inject 15 Units into the skin 2 times daily 5 pen 3    mupirocin (BACTROBAN) 2 % ointment AFFECTED AREA(S) THREE TIMES A DAY 15 g 2    lisinopril-hydrochlorothiazide (PRINZIDE;ZESTORETIC) 20-12.5 MG per tablet Take 1 tablet by mouth daily 30 tablet 5    aspirin-acetaminophen-caffeine (EXCEDRIN MIGRAINE) 250-250-65 MG per tablet Take 2 tablets by mouth 2 times daily      traMADol (ULTRAM) 50 MG tablet Take 1 tablet by mouth every 6 hours as needed for Pain for up to 30 days.  120 tablet 0    blood glucose monitor strips Test 3  times a day as needed for symptoms of irregular blood glucose. Has true metrix meter. 100 strip 5    ULTICARE MICRO PEN NEEDLES 32G X 4 MM MISC Inject 100 each into the skin 2 times daily 100 each 3     Current Facility-Administered Medications   Medication Dose Route Frequency Provider Last Rate Last Dose    cyanocobalamin injection 1,000 mcg  1,000 mcg Intramuscular Once Rodrigo MD Gem         No Known Allergies      Subjective:   Review of Systems   Constitutional: Negative for chills, diaphoresis and fever. HENT: Negative for congestion, sinus pressure and sore throat. Eyes: Negative for visual disturbance. Respiratory: Negative for cough and shortness of breath. Cardiovascular: Negative for chest pain and leg swelling. Gastrointestinal: Negative for diarrhea, nausea and vomiting. Genitourinary: Negative for dysuria, menstrual problem, urgency and vaginal discharge. Musculoskeletal: Negative for arthralgias, back pain and myalgias. Skin: Positive for rash and wound. Neurological: Negative for weakness, numbness and headaches. Psychiatric/Behavioral: Negative for sleep disturbance.       :   /80   Pulse 103   Temp 96.6 °F (35.9 °C)   Ht 5' 7.52\" (1.715 m)   Wt 283 lb (128.4 kg)   SpO2 96%   BMI 43.64 kg/m²     Physical Exam  Constitutional:       General: She is not in acute distress. Appearance: She is well-developed. She is not diaphoretic. HENT:      Head: Normocephalic and atraumatic. Mouth/Throat:      Pharynx: No oropharyngeal exudate. Eyes:      General: No scleral icterus. Neck:      Musculoskeletal: Neck supple. Vascular: No carotid bruit. Cardiovascular:      Heart sounds: No murmur. No friction rub. No gallop. Pulmonary:      Effort: No respiratory distress. Breath sounds: No wheezing or rales. Chest:      Chest wall: No tenderness. Musculoskeletal:      Right lower leg: No edema. Left lower leg: No edema. Lymphadenopathy:      Cervical: No cervical adenopathy.    Skin: Findings: Erythema (abrasions noted on RLE from self inflicted trauma.) present. No rash. Neurological:      Mental Status: She is alert and oriented to person, place, and time. Cranial Nerves: No cranial nerve deficit. Psychiatric:         Behavior: Behavior normal.         Thought Content: Thought content normal.         Judgment: Judgment normal.         Assessment:       Diagnosis Orders   1. Fibromyalgia  traMADol (ULTRAM) 50 MG tablet   2. Chronic pruritus  Dalia Cevallos MD, Dermatology, [facility]:      Return if symptoms worsen or fail to improve. Orders Placed This Encounter   Procedures    INFLUENZA, QUADV, 3 YRS AND OLDER, IM PF, PREFILL SYR OR SDV, 0.5ML (Miryam Early, PF)   Dalia Cevallos MD, Dermatology, [facility]     Referral Priority:   Routine     Referral Type:   Eval and Treat     Referral Reason:   Specialty Services Required     Referred to Provider:   Nancy Brooke MD     Requested Specialty:   Dermatology     Number of Visits Requested:   1     Orders Placed This Encounter   Medications    traMADol (ULTRAM) 50 MG tablet     Sig: Take 1 tablet by mouth every 6 hours as needed for Pain for up to 30 days. Dispense:  120 tablet     Refill:  0     Reduce doses taken as pain becomes manageable    cephALEXin (KEFLEX) 500 MG capsule     Sig: Take 1 capsule by mouth 3 times daily for 20 days     Dispense:  30 capsule     Refill:  1    cyanocobalamin injection 1,000 mcg     B12 shot  Keflex refill but she agrees to see Dermatology going forward. Tramadol Rx for Dec provided.

## 2021-01-04 DIAGNOSIS — E66.01 MORBID OBESITY DUE TO EXCESS CALORIES (HCC): ICD-10-CM

## 2021-01-04 DIAGNOSIS — E11.9 TYPE 2 DIABETES MELLITUS WITHOUT COMPLICATION, WITH LONG-TERM CURRENT USE OF INSULIN (HCC): ICD-10-CM

## 2021-01-04 DIAGNOSIS — Z79.4 TYPE 2 DIABETES MELLITUS WITHOUT COMPLICATION, WITH LONG-TERM CURRENT USE OF INSULIN (HCC): ICD-10-CM

## 2021-01-04 DIAGNOSIS — M79.7 FIBROMYALGIA: ICD-10-CM

## 2021-01-04 RX ORDER — TRAMADOL HYDROCHLORIDE 50 MG/1
50 TABLET ORAL EVERY 6 HOURS PRN
Qty: 120 TABLET | Refills: 0 | OUTPATIENT
Start: 2021-01-04 | End: 2021-02-03

## 2021-01-04 RX ORDER — CEPHALEXIN 500 MG/1
500 CAPSULE ORAL 3 TIMES DAILY
Qty: 30 CAPSULE | Refills: 1 | OUTPATIENT
Start: 2021-01-04 | End: 2021-01-24

## 2021-01-04 RX ORDER — GLUCOSAMINE HCL/CHONDROITIN SU 500-400 MG
CAPSULE ORAL
Qty: 100 STRIP | Refills: 5 | Status: SHIPPED | OUTPATIENT
Start: 2021-01-04 | End: 2021-02-05 | Stop reason: SDUPTHER

## 2021-01-04 RX ORDER — FLUTICASONE PROPIONATE 50 MCG
SPRAY, SUSPENSION (ML) NASAL
Qty: 1 BOTTLE | Refills: 2 | Status: SHIPPED | OUTPATIENT
Start: 2021-01-04 | End: 2021-04-26 | Stop reason: SDUPTHER

## 2021-01-04 NOTE — TELEPHONE ENCOUNTER
Needs to do medication contract and UDS for tramadol. I normally don't prescribe Keflex. What is that for?

## 2021-01-04 NOTE — TELEPHONE ENCOUNTER
Joseph Rutledge, please call the patient and see what they are taking this medication for and let Dr. Tamika Lam know please and thank you.

## 2021-01-04 NOTE — TELEPHONE ENCOUNTER
Pt takes keflex for right leg. Pt has psoriasis and it itches so bad, then it opens up so she takes the keflex.           Marky London is calling to request a refill on the following medication(s):    Medication Request:  Requested Prescriptions     Pending Prescriptions Disp Refills    fluticasone (FLONASE) 50 MCG/ACT nasal spray 1 Bottle 2     Sig: SPRAY TWO SPRAYS IN EACH NOSTRIL ONCE DAILY    cephALEXin (KEFLEX) 500 MG capsule 30 capsule 1     Sig: Take 1 capsule by mouth 3 times daily for 20 days       Last Visit Date (If Applicable):  Visit date not found    Next Visit Date:    3/4/2021

## 2021-01-05 DIAGNOSIS — M79.7 FIBROMYALGIA: ICD-10-CM

## 2021-01-05 NOTE — TELEPHONE ENCOUNTER
Pt takes keflex for right leg.  Pt has psoriasis and it itches so bad, then it opens up so she takes the keflex

## 2021-01-05 NOTE — TELEPHONE ENCOUNTER
Claudene Georgi is calling to request a refill on the following medication(s):    Medication Request:  Requested Prescriptions     Pending Prescriptions Disp Refills    traMADol (ULTRAM) 50 MG tablet 120 tablet 0     Sig: Take 1 tablet by mouth every 6 hours as needed for Pain for up to 30 days.        Last Visit Date (If Applicable):  23/73/1034    Next Visit Date:    Visit date not found

## 2021-01-05 NOTE — TELEPHONE ENCOUNTER
I would feel much more comfortable if I or someone was able to see and evaluate her leg and the skin issue. I normally don't send antibiotics over the phone but require the patient to be seen. At least until we can establish care then maybe we can come up with a plan going forward.  But for this first time with me I'd like to see her (or have her be seen at least)

## 2021-01-06 ENCOUNTER — NURSE ONLY (OUTPATIENT)
Dept: FAMILY MEDICINE CLINIC | Age: 61
End: 2021-01-06

## 2021-01-06 ENCOUNTER — HOSPITAL ENCOUNTER (OUTPATIENT)
Age: 61
Setting detail: SPECIMEN
Discharge: HOME | End: 2021-01-06
Payer: MEDICARE

## 2021-01-06 DIAGNOSIS — M79.7 FIBROMYALGIA: ICD-10-CM

## 2021-01-06 DIAGNOSIS — M79.7 FIBROMYALGIA: Primary | ICD-10-CM

## 2021-01-06 RX ORDER — CEPHALEXIN 500 MG/1
500 CAPSULE ORAL 3 TIMES DAILY
Qty: 30 CAPSULE | Refills: 0 | OUTPATIENT
Start: 2021-01-06 | End: 2021-01-16

## 2021-01-06 RX ORDER — TRAMADOL HYDROCHLORIDE 50 MG/1
50 TABLET ORAL EVERY 6 HOURS PRN
Qty: 120 TABLET | Refills: 0 | OUTPATIENT
Start: 2021-01-06 | End: 2021-02-05

## 2021-01-06 NOTE — TELEPHONE ENCOUNTER
Need to have UDS and med contract. Looks like she is coming in 1/6/2021 so that is great. Again with the antibiotic I advise my patients to be seen by someone to be evaluated b/c if they may need an antibiotic, and previous oral antibiotics haven't worked, she may need IV antibiotics, but further evaluation.  Not safe to just send in antibiotics

## 2021-01-07 RX ORDER — TRAMADOL HYDROCHLORIDE 50 MG/1
50 TABLET ORAL EVERY 6 HOURS PRN
Qty: 120 TABLET | Refills: 0 | Status: SHIPPED | OUTPATIENT
Start: 2021-01-09 | End: 2021-01-11 | Stop reason: SDUPTHER

## 2021-01-07 NOTE — TELEPHONE ENCOUNTER
Normally I require waiting until the UDS results are back, but I will go ahead and refill the medication now.

## 2021-01-09 LAB
6-ACETYLMORPHINE, UR: NOT DETECTED
7-AMINOCLONAZEPAM, URINE: NOT DETECTED
ALPHA-OH-ALPRAZ, URINE: NOT DETECTED
ALPRAZOLAM, URINE: NOT DETECTED
AMPHETAMINES, URINE: NOT DETECTED
BARBITURATES, URINE: NOT DETECTED
BENZOYLECGONINE, UR: NOT DETECTED
BUPRENORPHINE URINE: NOT DETECTED
CARISOPRODOL, UR: NOT DETECTED
CLONAZEPAM, URINE: NOT DETECTED
CODEINE, URINE: NOT DETECTED
CREATININE URINE: 93 MG/DL (ref 20–400)
DIAZEPAM, URINE: NOT DETECTED
DRUGS EXPECTED, UR: NORMAL
EER HI RES INTERP UR: NORMAL
ETHYL GLUCURONIDE UR: NOT DETECTED
FENTANYL URINE: NOT DETECTED
HYDROCODONE, URINE: NOT DETECTED
HYDROMORPHONE, URINE: NOT DETECTED
LORAZEPAM, URINE: NOT DETECTED
MARIJUANA METAB, UR: NOT DETECTED
MDA, UR: NOT DETECTED
MDEA, EVE, UR: NOT DETECTED
MDMA URINE: NOT DETECTED
MEPERIDINE METAB, UR: NOT DETECTED
METHADONE, URINE: NOT DETECTED
METHAMPHETAMINE, URINE: NOT DETECTED
METHYLPHENIDATE: NOT DETECTED
MIDAZOLAM, URINE: NOT DETECTED
MORPHINE URINE: NOT DETECTED
NORBUPRENORPHINE, URINE: NOT DETECTED
NORDIAZEPAM, URINE: NOT DETECTED
NORFENTANYL, URINE: NOT DETECTED
NORHYDROCODONE, URINE: NOT DETECTED
NOROXYCODONE, URINE: NOT DETECTED
NOROXYMORPHONE, URINE: NOT DETECTED
OXAZEPAM, URINE: NOT DETECTED
OXYCODONE URINE: NOT DETECTED
OXYMORPHONE, URINE: NOT DETECTED
PAIN MANAGEMENT DRUG PANEL INTERP, URINE: NORMAL
PAIN MGT DRUG PANEL, HI RES, UR: NORMAL
PCP,URINE: NOT DETECTED
PHENTERMINE, UR: NOT DETECTED
PROPOXYPHENE, URINE: NOT DETECTED
TAPENTADOL, URINE: NOT DETECTED
TAPENTADOL-O-SULFATE, URINE: NOT DETECTED
TEMAZEPAM, URINE: NOT DETECTED
TRAMADOL, URINE: PRESENT
ZOLPIDEM, URINE: NOT DETECTED

## 2021-01-11 DIAGNOSIS — M79.7 FIBROMYALGIA: ICD-10-CM

## 2021-01-11 RX ORDER — TRAMADOL HYDROCHLORIDE 50 MG/1
50 TABLET ORAL EVERY 6 HOURS PRN
Qty: 120 TABLET | Refills: 0 | Status: SHIPPED | OUTPATIENT
Start: 2021-01-11 | End: 2021-02-05 | Stop reason: SDUPTHER

## 2021-01-11 RX ORDER — CEPHALEXIN 500 MG/1
500 CAPSULE ORAL 3 TIMES DAILY
Qty: 30 CAPSULE | Refills: 1 | OUTPATIENT
Start: 2021-01-11 | End: 2021-01-31

## 2021-01-11 NOTE — TELEPHONE ENCOUNTER
Karen Barba is calling to request a refill on the following medication(s):    Medication Request:  Requested Prescriptions     Pending Prescriptions Disp Refills    traMADol (ULTRAM) 50 MG tablet 120 tablet 0     Sig: Take 1 tablet by mouth every 6 hours as needed for Pain for up to 30 days.     cephALEXin (KEFLEX) 500 MG capsule 30 capsule 1     Sig: Take 1 capsule by mouth 3 times daily for 20 days       Last Visit Date (If Applicable):  40/31/7932    Next Visit Date:    1/19/2021        Last Refill:  12/11/2020

## 2021-02-04 DIAGNOSIS — E11.9 TYPE 2 DIABETES MELLITUS WITHOUT COMPLICATION, WITH LONG-TERM CURRENT USE OF INSULIN (HCC): ICD-10-CM

## 2021-02-04 DIAGNOSIS — E66.01 MORBID OBESITY DUE TO EXCESS CALORIES (HCC): ICD-10-CM

## 2021-02-04 DIAGNOSIS — M79.7 FIBROMYALGIA: ICD-10-CM

## 2021-02-04 DIAGNOSIS — Z79.4 TYPE 2 DIABETES MELLITUS WITHOUT COMPLICATION, WITH LONG-TERM CURRENT USE OF INSULIN (HCC): ICD-10-CM

## 2021-02-04 RX ORDER — PEN NEEDLE, DIABETIC 32GX 5/32"
100 NEEDLE, DISPOSABLE MISCELLANEOUS 2 TIMES DAILY
Qty: 100 EACH | Refills: 3 | Status: CANCELLED | OUTPATIENT
Start: 2021-02-04

## 2021-02-04 NOTE — TELEPHONE ENCOUNTER
Patient is requesting refills on her medications. She was able to verify name and dosage. States she is almost out. LOV: 11/19/2020  NOV: 03/04/21    [facility] [address].

## 2021-02-04 NOTE — TELEPHONE ENCOUNTER
Mariana Mcgarry is calling to request a refill on the following medication(s):    Medication Request:  Requested Prescriptions     Pending Prescriptions Disp Refills    insulin glargine (BASAGLAR KWIKPEN) 100 UNIT/ML injection pen 5 pen 3     Sig: Inject 15 Units into the skin 2 times daily    blood glucose monitor strips 100 strip 5     Sig: Test 3  times a day as needed for symptoms of irregular blood glucose. Has true metrix meter.  traMADol (ULTRAM) 50 MG tablet 120 tablet 0     Sig: Take 1 tablet by mouth every 6 hours as needed for Pain for up to 30 days.        Last Visit Date (If Applicable):  86/60/9847    Next Visit Date:    3/4/2021    Last Refill:  1/11/2021

## 2021-02-05 RX ORDER — TRAMADOL HYDROCHLORIDE 50 MG/1
50 TABLET ORAL EVERY 6 HOURS PRN
Qty: 120 TABLET | Refills: 0 | Status: SHIPPED | OUTPATIENT
Start: 2021-02-06 | End: 2021-03-04 | Stop reason: SDUPTHER

## 2021-02-05 RX ORDER — INSULIN GLARGINE 100 [IU]/ML
15 INJECTION, SOLUTION SUBCUTANEOUS 2 TIMES DAILY
Qty: 5 PEN | Refills: 3 | Status: SHIPPED | OUTPATIENT
Start: 2021-02-05 | End: 2021-05-18

## 2021-02-05 RX ORDER — GLUCOSAMINE HCL/CHONDROITIN SU 500-400 MG
CAPSULE ORAL
Qty: 100 STRIP | Refills: 5 | Status: SHIPPED | OUTPATIENT
Start: 2021-02-05 | End: 2021-03-30 | Stop reason: SDUPTHER

## 2021-03-04 ENCOUNTER — OFFICE VISIT (OUTPATIENT)
Dept: FAMILY MEDICINE CLINIC | Age: 61
End: 2021-03-04
Payer: MEDICARE

## 2021-03-04 VITALS
TEMPERATURE: 96.6 F | BODY MASS INDEX: 44.2 KG/M2 | DIASTOLIC BLOOD PRESSURE: 76 MMHG | HEART RATE: 100 BPM | SYSTOLIC BLOOD PRESSURE: 130 MMHG | WEIGHT: 286.6 LBS | OXYGEN SATURATION: 97 %

## 2021-03-04 DIAGNOSIS — M79.7 FIBROMYALGIA: ICD-10-CM

## 2021-03-04 DIAGNOSIS — Z12.12 ENCOUNTER FOR COLORECTAL CANCER SCREENING: ICD-10-CM

## 2021-03-04 DIAGNOSIS — F33.2 MAJOR DEPRESSIVE DISORDER, RECURRENT EPISODE, SEVERE WITH MIXED FEATURES (HCC): ICD-10-CM

## 2021-03-04 DIAGNOSIS — Z12.11 ENCOUNTER FOR COLORECTAL CANCER SCREENING: ICD-10-CM

## 2021-03-04 DIAGNOSIS — Z79.4 TYPE 2 DIABETES MELLITUS WITHOUT COMPLICATION, WITH LONG-TERM CURRENT USE OF INSULIN (HCC): ICD-10-CM

## 2021-03-04 DIAGNOSIS — K21.9 GASTROESOPHAGEAL REFLUX DISEASE, UNSPECIFIED WHETHER ESOPHAGITIS PRESENT: ICD-10-CM

## 2021-03-04 DIAGNOSIS — M51.36 DDD (DEGENERATIVE DISC DISEASE), LUMBAR: ICD-10-CM

## 2021-03-04 DIAGNOSIS — Z13.220 LIPID SCREENING: ICD-10-CM

## 2021-03-04 DIAGNOSIS — E11.9 TYPE 2 DIABETES MELLITUS WITHOUT COMPLICATION, WITH LONG-TERM CURRENT USE OF INSULIN (HCC): ICD-10-CM

## 2021-03-04 DIAGNOSIS — M15.9 PRIMARY OSTEOARTHRITIS INVOLVING MULTIPLE JOINTS: ICD-10-CM

## 2021-03-04 DIAGNOSIS — E53.8 VITAMIN B12 DEFICIENCY: ICD-10-CM

## 2021-03-04 DIAGNOSIS — E66.01 CLASS 3 SEVERE OBESITY DUE TO EXCESS CALORIES WITH SERIOUS COMORBIDITY AND BODY MASS INDEX (BMI) OF 40.0 TO 44.9 IN ADULT (HCC): ICD-10-CM

## 2021-03-04 DIAGNOSIS — I87.2 STASIS DERMATITIS OF BOTH LEGS: ICD-10-CM

## 2021-03-04 DIAGNOSIS — I99.9 CIRCULATION DISORDER OF LOWER EXTREMITY: ICD-10-CM

## 2021-03-04 DIAGNOSIS — I10 ESSENTIAL (PRIMARY) HYPERTENSION: Primary | ICD-10-CM

## 2021-03-04 DIAGNOSIS — M48.062 LUMBAR STENOSIS WITH NEUROGENIC CLAUDICATION: ICD-10-CM

## 2021-03-04 PROBLEM — M51.369 DDD (DEGENERATIVE DISC DISEASE), LUMBAR: Status: ACTIVE | Noted: 2021-03-04

## 2021-03-04 PROBLEM — M15.0 PRIMARY OSTEOARTHRITIS INVOLVING MULTIPLE JOINTS: Status: ACTIVE | Noted: 2021-03-04

## 2021-03-04 PROBLEM — R42 CHRONIC VERTIGO: Status: ACTIVE | Noted: 2021-03-04

## 2021-03-04 LAB
CREATININE URINE POCT: 300
HBA1C MFR BLD: 7.5 %
MICROALBUMIN/CREAT 24H UR: 30 MG/G{CREAT}
MICROALBUMIN/CREAT UR-RTO: <30

## 2021-03-04 PROCEDURE — G8417 CALC BMI ABV UP PARAM F/U: HCPCS | Performed by: FAMILY MEDICINE

## 2021-03-04 PROCEDURE — G8427 DOCREV CUR MEDS BY ELIG CLIN: HCPCS | Performed by: FAMILY MEDICINE

## 2021-03-04 PROCEDURE — 4004F PT TOBACCO SCREEN RCVD TLK: CPT | Performed by: FAMILY MEDICINE

## 2021-03-04 PROCEDURE — 82044 UR ALBUMIN SEMIQUANTITATIVE: CPT | Performed by: FAMILY MEDICINE

## 2021-03-04 PROCEDURE — 3051F HG A1C>EQUAL 7.0%<8.0%: CPT | Performed by: FAMILY MEDICINE

## 2021-03-04 PROCEDURE — 83036 HEMOGLOBIN GLYCOSYLATED A1C: CPT | Performed by: FAMILY MEDICINE

## 2021-03-04 PROCEDURE — 3017F COLORECTAL CA SCREEN DOC REV: CPT | Performed by: FAMILY MEDICINE

## 2021-03-04 PROCEDURE — 99214 OFFICE O/P EST MOD 30 MIN: CPT | Performed by: FAMILY MEDICINE

## 2021-03-04 PROCEDURE — G8482 FLU IMMUNIZE ORDER/ADMIN: HCPCS | Performed by: FAMILY MEDICINE

## 2021-03-04 PROCEDURE — 2022F DILAT RTA XM EVC RTNOPTHY: CPT | Performed by: FAMILY MEDICINE

## 2021-03-04 RX ORDER — OMEPRAZOLE 40 MG/1
40 CAPSULE, DELAYED RELEASE ORAL
Qty: 30 CAPSULE | Refills: 0 | Status: SHIPPED | OUTPATIENT
Start: 2021-03-04 | End: 2021-04-26 | Stop reason: SDUPTHER

## 2021-03-04 RX ORDER — TRAMADOL HYDROCHLORIDE 50 MG/1
100 TABLET ORAL EVERY 8 HOURS PRN
Qty: 120 TABLET | Refills: 0 | Status: SHIPPED | OUTPATIENT
Start: 2021-03-04 | End: 2021-03-31 | Stop reason: SDUPTHER

## 2021-03-04 ASSESSMENT — ENCOUNTER SYMPTOMS: BACK PAIN: 1

## 2021-03-04 NOTE — PROGRESS NOTES
[facility] Progress Note    Date:3/10/2021         Patient Loren Carmona     YOB: 1960     Age:60 y.o.     Assessment/Plan        Problem List Items Addressed This Visit        Circulatory    Essential (primary) hypertension - Primary     Controlled on current treatment regimen         Relevant Orders    Comprehensive Metabolic Panel       Endocrine    Type 2 diabetes mellitus without complication, with long-term current use of insulin (HCC)     Controlled on current treatment regimen         Relevant Orders    POCT glycosylated hemoglobin (Hb A1C) (Completed)    POCT microalbumin (Completed)    Comprehensive Metabolic Panel       Musculoskeletal and Integument    Primary osteoarthritis involving multiple joints     Controlled on Tramadol         Relevant Medications    traMADol (ULTRAM) 50 MG tablet    DDD (degenerative disc disease), lumbar     Worsening  Xray reviewed  MRI ordered  Continue tramadol prn         Relevant Medications    traMADol (ULTRAM) 50 MG tablet    Other Relevant Orders    MRI LUMBAR SPINE WO CONTRAST       Other    Lumbar stenosis with neurogenic claudication     Worsening pain  On Tramadol  MRI         Relevant Medications    traMADol (ULTRAM) 50 MG tablet    Other Relevant Orders    MRI LUMBAR SPINE WO CONTRAST    Class 3 severe obesity due to excess calories with serious comorbidity and body mass index (BMI) of 40.0 to 44.9 in adult [facility])     Lifestyle modifications         Relevant Orders    Lipid Panel    Major depressive disorder, recurrent episode, severe with mixed features ([facility] [address]     Stable  Will discuss further at next appt         Fibromyalgia     Stable on current treatment         Relevant Medications    traMADol (ULTRAM) 50 MG tablet      Other Visit Diagnoses     Encounter for colorectal cancer screening        Relevant Orders    Cologuard (For External Results Only)    Gastroesophageal reflux disease, unspecified whether esophagitis present        Relevant Medications    omeprazole (PRILOSEC) 40 MG delayed release capsule    Other Relevant Orders    Fela Cramer MD, Gastroenterology, [address]    Stasis dermatitis of both legs        Relevant Orders    ALONSO Valdez MD, Vascular Surgery, [place]    Circulation disorder of lower extremity        Relevant Orders    ALONSO Valdez MD, Vascular Surgery, [place]    Vitamin B12 deficiency        Relevant Orders    CBC Auto Differential    Vitamin B12    Lipid screening        Relevant Orders    Lipid Panel           Return in about 3 months (around 6/4/2021). Electronically signed by Wes Epps DO on 3/4/21         Subjective     Patrice Monroy is a 61 y.o. female presenting today for   Chief Complaint   Patient presents with   [facility]    Leg Problem     right    Other     b12 injection    Back Pain   . Patrice Monroy is a 61 y.o. female who presents for follow up of depression. Current symptoms include depressed mood. Symptoms have been stable since that time. Patient denies SI/HI. Not interested in medication    Hypertension  This is a chronic problem. The current episode started more than 1 year ago. The problem has been gradually improving since onset. The problem is controlled. Pertinent negatives include no blurred vision, chest pain, headaches, malaise/fatigue, neck pain, orthopnea, palpitations, peripheral edema, PND, shortness of breath or sweats. Past treatments include diuretics and ACE inhibitors. The current treatment provides significant improvement. Diabetes  She presents for her follow-up diabetic visit. She has type 2 diabetes mellitus. Her disease course has been stable. There are no hypoglycemic associated symptoms. Pertinent negatives for hypoglycemia include no headaches or sweats. There are no diabetic associated symptoms. Pertinent negatives for diabetes include no blurred vision, no chest pain, no weakness and no weight loss.  Symptoms are stable. Current diabetic treatment includes intensive insulin program. She is compliant with treatment most of the time. Her weight is fluctuating minimally. Her home blood glucose trend is fluctuating minimally. An ACE inhibitor/angiotensin II receptor blocker is being taken. Back Pain  This is a chronic problem. The current episode started more than 1 year ago. The problem occurs constantly. The problem has been gradually worsening since onset. The pain is present in the lumbar spine. The quality of the pain is described as shooting and stabbing. The pain is severe. Associated symptoms include leg pain. Pertinent negatives include no abdominal pain, bladder incontinence, bowel incontinence, chest pain, dysuria, fever, headaches, numbness, paresis, paresthesias, pelvic pain, perianal numbness, tingling, weakness or weight loss. She has tried analgesics for the symptoms. The treatment provided moderate relief. Review of Systems   Review of Systems   Constitutional: Negative. Negative for fever, malaise/fatigue and weight loss. HENT: Negative. Eyes: Negative. Negative for blurred vision. Respiratory: Negative. Negative for shortness of breath. Cardiovascular: Negative. Negative for chest pain, palpitations, orthopnea and PND. Gastrointestinal: Negative. Negative for abdominal pain and bowel incontinence. Endocrine: Negative. Genitourinary: Negative. Negative for bladder incontinence, dysuria and pelvic pain. Musculoskeletal: Positive for back pain. Negative for neck pain. Skin: Negative. Allergic/Immunologic: Negative. Neurological: Negative for tingling, weakness, numbness, headaches and paresthesias. Hematological: Negative. Psychiatric/Behavioral: Negative. All other systems reviewed and are negative.       Medications     Current Outpatient Medications   Medication Sig Dispense Refill    omeprazole (PRILOSEC) 40 MG delayed release capsule Take 1 capsule by mouth every morning (before breakfast) 30 capsule 0    traMADol (ULTRAM) 50 MG tablet Take 2 tablets by mouth every 8 hours as needed for Pain for up to 30 days. 120 tablet 0    insulin glargine (BASAGLAR KWIKPEN) 100 UNIT/ML injection pen Inject 15 Units into the skin 2 times daily 5 pen 3    blood glucose monitor strips Test 3  times a day as needed for symptoms of irregular blood glucose. Has true metrix meter. 100 strip 5    fluticasone (FLONASE) 50 MCG/ACT nasal spray SPRAY TWO SPRAYS IN EACH NOSTRIL ONCE DAILY 1 Bottle 2    fluocinonide (LIDEX) 0.05 % cream Apply topically 2 times daily. 120 g 2    mupirocin (BACTROBAN) 2 % ointment AFFECTED AREA(S) THREE TIMES A DAY 15 g 2    ULTICARE MICRO PEN NEEDLES 32G X 4 MM MISC Inject 100 each into the skin 2 times daily 100 each 3    lisinopril-hydrochlorothiazide (PRINZIDE;ZESTORETIC) 20-12.5 MG per tablet Take 1 tablet by mouth daily 30 tablet 5    aspirin-acetaminophen-caffeine (EXCEDRIN MIGRAINE) 250-250-65 MG per tablet Take 2 tablets by mouth 2 times daily       No current facility-administered medications for this visit. Past History    Past Medical History:   has no past medical history on file. Social History:   reports that she has been smoking cigarettes. She has a 15.00 pack-year smoking history. She has never used smokeless tobacco. She reports that she does not drink alcohol or use drugs. Family History: No family history on file. Surgical History:   Past Surgical History:   Procedure Laterality Date    COLONOSCOPY  06/2016    poor prep, there appears to be some polyps in the colon mixed with stool, could not be removed        Physical Examination      Vitals:  /76   Pulse 100   Temp 96.6 °F (35.9 °C)   Wt 286 lb 9.6 oz (130 kg)   SpO2 97%   BMI 44.20 kg/m²     Physical Exam  Vitals signs and nursing note reviewed. Constitutional:       General: She is not in acute distress. Appearance: Normal appearance.  She is obese. She is not ill-appearing, toxic-appearing or diaphoretic. HENT:      Head: Normocephalic and atraumatic. Right Ear: Tympanic membrane, ear canal and external ear normal. There is no impacted cerumen. Left Ear: Tympanic membrane, ear canal and external ear normal. There is no impacted cerumen. Nose: Nose normal. No congestion or rhinorrhea. Mouth/Throat:      Mouth: Mucous membranes are moist.      Pharynx: Oropharynx is clear. No oropharyngeal exudate or posterior oropharyngeal erythema. Eyes:      General: No scleral icterus. Right eye: No discharge. Left eye: No discharge. Extraocular Movements: Extraocular movements intact. Conjunctiva/sclera: Conjunctivae normal.      Pupils: Pupils are equal, round, and reactive to light. Neck:      Musculoskeletal: Normal range of motion and neck supple. Cardiovascular:      Rate and Rhythm: Normal rate and regular rhythm. Pulses: Normal pulses. Heart sounds: Normal heart sounds. No murmur. No friction rub. No gallop. Pulmonary:      Effort: Pulmonary effort is normal. No respiratory distress. Breath sounds: Normal breath sounds. No stridor. No wheezing, rhonchi or rales. Chest:      Chest wall: No tenderness. Abdominal:      General: Abdomen is flat. Bowel sounds are normal. There is no distension. Palpations: Abdomen is soft. There is no mass. Tenderness: There is no abdominal tenderness. There is no right CVA tenderness, left CVA tenderness, guarding or rebound. Hernia: No hernia is present. Musculoskeletal:      Lumbar back: She exhibits decreased range of motion, tenderness, pain and spasm. She exhibits no bony tenderness, no swelling, no edema, no deformity, no laceration and normal pulse. Skin:     General: Skin is warm. Capillary Refill: Capillary refill takes less than 2 seconds. Coloration: Skin is not jaundiced or pale.       Findings: No bruising, erythema, lesion or rash. Neurological:      General: No focal deficit present. Mental Status: She is alert and oriented to person, place, and time. Mental status is at baseline. Cranial Nerves: No cranial nerve deficit. Sensory: No sensory deficit. Motor: No weakness. Coordination: Coordination normal.      Gait: Gait normal.      Deep Tendon Reflexes: Reflexes normal.   Psychiatric:         Mood and Affect: Mood normal.         Behavior: Behavior normal.         Thought Content: Thought content normal.         Judgment: Judgment normal.         Labs/Imaging/Diagnostics   Labs:  Hemoglobin A1C   Date Value Ref Range Status   03/04/2021 7.5 % Final       Imaging Last 24 Hours:  MRI BRAIN WO CONTRAST  Narrative: EXAMINATION:  MRI OF THE BRAIN WITHOUT CONTRAST  3/14/2020 4:04 pm    TECHNIQUE:  Multiplanar multisequence MRI of the brain was performed without the  administration of intravenous contrast.    COMPARISON:  None. HISTORY:  ORDERING SYSTEM PROVIDED HISTORY: Chronic vertigo  TECHNOLOGIST PROVIDED HISTORY:  chronic vertigo    FINDINGS:  INTRACRANIAL STRUCTURES/VENTRICLES: There is no acute infarct or mass. There  is moderate patchy cerebral and pontine white matter T2/FLAIR hyperintensity,  nonspecific but generally ascribed to sequela of chronic microvascular  ischemia. No mass effect or midline shift. No evidence of an acute  intracranial hemorrhage. The ventricles and sulci are normal in size and  configuration. The sellar/suprasellar regions appear unremarkable. The  normal signal voids within the major intracranial vessels appear maintained. ORBITS: The visualized portion of the orbits demonstrate no acute abnormality. SINUSES: The visualized paranasal sinuses and mastoid air cells are well  aerated. BONES/SOFT TISSUES: The bone marrow signal intensity appears normal. The soft  tissues demonstrate no acute abnormality.   Impression: Moderate chronic white matter microvascular ischemic changes.

## 2021-03-04 NOTE — PATIENT INSTRUCTIONS
Patient Education        Learning About Prescribed Opioid Medicine for Chronic Pain  Introduction     Opioids are medicines used to relieve moderate to severe pain. Examples include fentanyl, hydrocodone, morphine, and oxycodone. Heroin is an example of an illegal opioid. They may be used for long-term pain, such as for cancer. Or they may be used when other treatments haven't worked. Opioids relieve pain by changing the way your body feels pain and the way you feel about pain. They don't cure a health problem. But they may help you manage the pain. Your doctor will talk with you about how they fit into your overall treatment plan. Doctors follow a strict set of rules for giving opioids to their patients who have long-term (chronic) pain. This is because they can be dangerous. They can cause problems if they are not used correctly. They can also be misused. And they may not do a better job of treating pain than non-opioids, like acetaminophen and NSAIDs. What happens before you get a prescription? Your doctor may talk with you about your pain history. He or she may ask about your family history. You may have a physical exam. You may also see a pain specialist. This helps your doctor decide if an opioid is right for you. Your doctor will also review your history of opioid use. Most states have a program that tracks prescriptions. If your state does, your doctor will check it to see if you've had prescriptions for opioids before. You may have a urine test to check for opioids in your system. If you're a woman, you may have a pregnancy test. Opioids are not safe to take if you are pregnant. When your doctor prescribes the medicine, he or she will discuss your treatment plan with you. That includes the risks and benefits of opioid medicines. Your doctor will tell you how much pain relief and improved ability to function you can expect.  The goal is to take the lowest dose that improves your pain for the shortest amount of time. Your doctor will also talk with you about other things you can do to help relieve pain. Your doctor may suggest different medicines or other options, such as steroid injections, ice or heat, physical therapy, or ways to reduce stress. It's always a good idea to ask questions before you get a new prescription. This is especially true when you get a prescription for an opioid. Some questions to ask your doctor include:  · Why do I need this medicine? Is it right for me? · How long should I take this medicine? · What can I do to help with side effects, like constipation? · How should I store this medicine? What should I do with leftover or unused opioid medicine? · Do I need a prescription for naloxone? You and your doctor will talk about your responsibilities. You may sign a written agreement. You will agree to take your medicine exactly as your doctor tells you to. Delmar Nava also agree to be careful with it and not to share it with others. What happens after you start to use your medicine? Regular follow-up visits to your doctor will help you and your doctor make sure that the medicine is the right treatment for your pain. At every visit, your doctor will check these things:  · Is your pain being controlled? · Are you better able to function and be active? · Are you having any side effects, like constipation, nausea, or being too sleepy? · Are the non-opioid pain control measures working? Are there other things you can try? · Are there any signs that you are misusing your medicine? While you are taking an opioid, you may have drug tests and prescription history checks from time to time. Follow-up care is a key part of your treatment and safety. Be sure to make and go to all appointments, and call your doctor if you are having problems. It's also a good idea to know your test results and keep a list of the medicines you take. Where can you learn more?   Go to [email or web address] and sign in to your [employer] account. Enter D374 in the [facility] box to learn more about \"Learning About Prescribed Opioid Medicine for Chronic Pain. \"     If you do not have an account, please click on the \"Sign Up Now\" link. Current as of: November 20, 2019               Content Version: 12.6  © 3742-9720 [employer]. Care instructions adapted under license by [facility] ([facility]). If you have questions about a medical condition or this instruction, always ask your healthcare professional. [address] any warranty or liability for your use of this information.

## 2021-03-08 ENCOUNTER — TELEPHONE (OUTPATIENT)
Dept: GASTROENTEROLOGY | Age: 61
End: 2021-03-08

## 2021-03-08 NOTE — TELEPHONE ENCOUNTER
LVM to callback and schedule appt from referral -1st attempt  Letter sent to call and schedule appt from referral - 2nd attempt

## 2021-03-10 ASSESSMENT — ENCOUNTER SYMPTOMS
ALLERGIC/IMMUNOLOGIC NEGATIVE: 1
RESPIRATORY NEGATIVE: 1
GASTROINTESTINAL NEGATIVE: 1
SHORTNESS OF BREATH: 0
EYES NEGATIVE: 1
BOWEL INCONTINENCE: 0
ORTHOPNEA: 0
BLURRED VISION: 0
ABDOMINAL PAIN: 0

## 2021-03-23 ENCOUNTER — HOSPITAL ENCOUNTER (OUTPATIENT)
Dept: MRI IMAGING | Age: 61
Discharge: HOME | End: 2021-03-25
Payer: MEDICARE

## 2021-03-23 ENCOUNTER — HOSPITAL ENCOUNTER (OUTPATIENT)
Age: 61
Discharge: HOME | End: 2021-03-23
Payer: MEDICARE

## 2021-03-23 DIAGNOSIS — Z13.220 LIPID SCREENING: ICD-10-CM

## 2021-03-23 DIAGNOSIS — M51.36 DDD (DEGENERATIVE DISC DISEASE), LUMBAR: ICD-10-CM

## 2021-03-23 DIAGNOSIS — E66.01 CLASS 3 SEVERE OBESITY DUE TO EXCESS CALORIES WITH SERIOUS COMORBIDITY AND BODY MASS INDEX (BMI) OF 40.0 TO 44.9 IN ADULT (HCC): ICD-10-CM

## 2021-03-23 DIAGNOSIS — E53.8 VITAMIN B12 DEFICIENCY: ICD-10-CM

## 2021-03-23 DIAGNOSIS — I10 ESSENTIAL (PRIMARY) HYPERTENSION: ICD-10-CM

## 2021-03-23 DIAGNOSIS — Z79.4 TYPE 2 DIABETES MELLITUS WITHOUT COMPLICATION, WITH LONG-TERM CURRENT USE OF INSULIN (HCC): ICD-10-CM

## 2021-03-23 DIAGNOSIS — E11.9 TYPE 2 DIABETES MELLITUS WITHOUT COMPLICATION, WITH LONG-TERM CURRENT USE OF INSULIN (HCC): ICD-10-CM

## 2021-03-23 DIAGNOSIS — M48.062 LUMBAR STENOSIS WITH NEUROGENIC CLAUDICATION: ICD-10-CM

## 2021-03-23 LAB
ABSOLUTE EOS #: 0.28 K/UL (ref 0–0.44)
ABSOLUTE IMMATURE GRANULOCYTE: 0.05 K/UL (ref 0–0.3)
ABSOLUTE LYMPH #: 4.23 K/UL (ref 1.1–3.7)
ABSOLUTE MONO #: 0.74 K/UL (ref 0.1–1.2)
ALBUMIN SERPL-MCNC: 3.9 G/DL (ref 3.5–5.2)
ALBUMIN/GLOBULIN RATIO: 1.3 (ref 1–2.5)
ALP BLD-CCNC: 106 U/L (ref 35–104)
ALT SERPL-CCNC: 20 U/L (ref 5–33)
ANION GAP SERPL CALCULATED.3IONS-SCNC: 16 MMOL/L (ref 9–17)
AST SERPL-CCNC: 19 U/L
BASOPHILS # BLD: 1 % (ref 0–2)
BASOPHILS ABSOLUTE: 0.09 K/UL (ref 0–0.2)
BILIRUB SERPL-MCNC: 0.28 MG/DL (ref 0.3–1.2)
BUN BLDV-MCNC: 18 MG/DL (ref 8–23)
BUN/CREAT BLD: ABNORMAL (ref 9–20)
CALCIUM SERPL-MCNC: 9.5 MG/DL (ref 8.6–10.4)
CHLORIDE BLD-SCNC: 105 MMOL/L (ref 98–107)
CHOLESTEROL/HDL RATIO: 3.4
CHOLESTEROL: 140 MG/DL
CO2: 23 MMOL/L (ref 20–31)
CREAT SERPL-MCNC: 0.96 MG/DL (ref 0.5–0.9)
DIFFERENTIAL TYPE: ABNORMAL
EOSINOPHILS RELATIVE PERCENT: 2 % (ref 1–4)
GFR AFRICAN AMERICAN: >60 ML/MIN
GFR NON-AFRICAN AMERICAN: 59 ML/MIN
GFR SERPL CREATININE-BSD FRML MDRD: ABNORMAL ML/MIN/{1.73_M2}
GFR SERPL CREATININE-BSD FRML MDRD: ABNORMAL ML/MIN/{1.73_M2}
GLUCOSE BLD-MCNC: 160 MG/DL (ref 70–99)
HCT VFR BLD CALC: 50.5 % (ref 36.3–47.1)
HDLC SERPL-MCNC: 41 MG/DL
HEMOGLOBIN: 16.1 G/DL (ref 11.9–15.1)
IMMATURE GRANULOCYTES: 0 %
LDL CHOLESTEROL: 69 MG/DL (ref 0–130)
LYMPHOCYTES # BLD: 34 % (ref 24–43)
MCH RBC QN AUTO: 27.8 PG (ref 25.2–33.5)
MCHC RBC AUTO-ENTMCNC: 31.9 G/DL (ref 28.4–34.8)
MCV RBC AUTO: 87.1 FL (ref 82.6–102.9)
MONOCYTES # BLD: 6 % (ref 3–12)
NRBC AUTOMATED: 0 PER 100 WBC
PDW BLD-RTO: 14.1 % (ref 11.8–14.4)
PLATELET # BLD: ABNORMAL K/UL (ref 138–453)
PLATELET ESTIMATE: ABNORMAL
PLATELET, FLUORESCENCE: 234 K/UL (ref 138–453)
PLATELET, IMMATURE FRACTION: 9.4 % (ref 1.1–10.3)
PMV BLD AUTO: ABNORMAL FL (ref 8.1–13.5)
POTASSIUM SERPL-SCNC: 4.2 MMOL/L (ref 3.7–5.3)
RBC # BLD: 5.8 M/UL (ref 3.95–5.11)
RBC # BLD: ABNORMAL 10*6/UL
SEG NEUTROPHILS: 56 % (ref 36–65)
SEGMENTED NEUTROPHILS ABSOLUTE COUNT: 6.94 K/UL (ref 1.5–8.1)
SODIUM BLD-SCNC: 144 MMOL/L (ref 135–144)
TOTAL PROTEIN: 6.9 G/DL (ref 6.4–8.3)
TRIGL SERPL-MCNC: 150 MG/DL
VITAMIN B-12: 493 PG/ML (ref 232–1245)
VLDLC SERPL CALC-MCNC: ABNORMAL MG/DL (ref 1–30)
WBC # BLD: 12.3 K/UL (ref 3.5–11.3)
WBC # BLD: ABNORMAL 10*3/UL

## 2021-03-23 PROCEDURE — 80053 COMPREHEN METABOLIC PANEL: CPT

## 2021-03-23 PROCEDURE — 36415 COLL VENOUS BLD VENIPUNCTURE: CPT

## 2021-03-23 PROCEDURE — 85055 RETICULATED PLATELET ASSAY: CPT

## 2021-03-23 PROCEDURE — 82607 VITAMIN B-12: CPT

## 2021-03-23 PROCEDURE — 80061 LIPID PANEL: CPT

## 2021-03-23 PROCEDURE — 72148 MRI LUMBAR SPINE W/O DYE: CPT

## 2021-03-23 PROCEDURE — 85025 COMPLETE CBC W/AUTO DIFF WBC: CPT

## 2021-03-25 DIAGNOSIS — D75.1 POLYCYTHEMIA: Primary | ICD-10-CM

## 2021-03-30 ENCOUNTER — TELEPHONE (OUTPATIENT)
Dept: FAMILY MEDICINE CLINIC | Age: 61
End: 2021-03-30

## 2021-03-30 DIAGNOSIS — M15.9 PRIMARY OSTEOARTHRITIS INVOLVING MULTIPLE JOINTS: Primary | ICD-10-CM

## 2021-03-30 DIAGNOSIS — E66.01 MORBID OBESITY DUE TO EXCESS CALORIES (HCC): ICD-10-CM

## 2021-03-30 DIAGNOSIS — Z79.4 TYPE 2 DIABETES MELLITUS WITHOUT COMPLICATION, WITH LONG-TERM CURRENT USE OF INSULIN (HCC): ICD-10-CM

## 2021-03-30 DIAGNOSIS — M48.062 LUMBAR STENOSIS WITH NEUROGENIC CLAUDICATION: ICD-10-CM

## 2021-03-30 DIAGNOSIS — E11.9 TYPE 2 DIABETES MELLITUS WITHOUT COMPLICATION, WITH LONG-TERM CURRENT USE OF INSULIN (HCC): ICD-10-CM

## 2021-03-30 RX ORDER — GLUCOSAMINE HCL/CHONDROITIN SU 500-400 MG
CAPSULE ORAL
Qty: 100 STRIP | Refills: 5 | Status: SHIPPED | OUTPATIENT
Start: 2021-03-30 | End: 2021-09-17 | Stop reason: SDUPTHER

## 2021-03-30 NOTE — TELEPHONE ENCOUNTER
Yes that is correct. I am willing to increase to every 6 hours temporarily to bridge the gap until we can get her established with pain management.  I will put in a referral and please prep refill and have her make appointment as soon as possible with pain management since she needs an increase in medication

## 2021-03-30 NOTE — TELEPHONE ENCOUNTER
Karen Barba is calling to request a refill on the following medication(s):    Medication Request:  Requested Prescriptions     Pending Prescriptions Disp Refills    blood glucose monitor strips 100 strip 5     Sig: Test 3  times a day as needed for symptoms of irregular blood glucose. Has true metrix meter.        Last Visit Date (If Applicable):  6/7/4149    Next Visit Date:    6/4/2021

## 2021-03-30 NOTE — TELEPHONE ENCOUNTER
Pt has been taking the tramadol every 6 hours instead of every 8 hours because her back hurts so bad. She would like the sig changed to every 6 hours. And she will be out of med tomorrow. I told her that your comment on the sig last time states any change warrents pain management referral.  She did not seem too concerned about it.

## 2021-03-31 DIAGNOSIS — M79.7 FIBROMYALGIA: ICD-10-CM

## 2021-03-31 RX ORDER — TRAMADOL HYDROCHLORIDE 50 MG/1
100 TABLET ORAL EVERY 6 HOURS PRN
Qty: 120 TABLET | Refills: 0 | Status: SHIPPED | OUTPATIENT
Start: 2021-03-31 | End: 2021-04-30

## 2021-03-31 NOTE — TELEPHONE ENCOUNTER
Refill sent. Recommend patient get in with PM. Patient can contact Dr. Manjula Washington next week if she has further questions in regards to refills, PM referral, and imaging results.     Tx

## 2021-03-31 NOTE — TELEPHONE ENCOUNTER
Name of Caller:  Shravan Solis    Relationship to patient:   self    Best contact number:  [phone]    Reason for call:  Patient is requesting status on her medication refill of Tramadol, she states that as of this morning she is completely out. She is also asking for a call back to discuss her MRI results.     Thank you

## 2021-03-31 NOTE — TELEPHONE ENCOUNTER
Josue Ghosh is calling to request a refill on the following medication(s):    _________________________________  Please verify Qty for month  Please see Telephone Encounter on this in chart review  _________________________________    Medication Request:  Requested Prescriptions     Pending Prescriptions Disp Refills    traMADol (ULTRAM) 50 MG tablet 120 tablet 0     Sig: Take 2 tablets by mouth every 6 hours as needed for Pain for up to 30 days.        Last Visit Date (If Applicable):  5/7/6016    Next Visit Date:    6/4/2021

## 2021-03-31 NOTE — TELEPHONE ENCOUNTER
Spoke to patient - she was given phone number to Dr. Tesfaye Garay office. Patient wants the switch to 6 hours as already approved by Dr. Abhay Owens.    Loading Rx per Dr. Abhay Owens request

## 2021-04-26 DIAGNOSIS — K21.9 GASTROESOPHAGEAL REFLUX DISEASE, UNSPECIFIED WHETHER ESOPHAGITIS PRESENT: ICD-10-CM

## 2021-04-26 DIAGNOSIS — J30.2 SEASONAL ALLERGIC RHINITIS, UNSPECIFIED TRIGGER: Primary | ICD-10-CM

## 2021-04-26 RX ORDER — FLUTICASONE PROPIONATE 50 MCG
SPRAY, SUSPENSION (ML) NASAL
Qty: 1 BOTTLE | Refills: 2 | Status: SHIPPED | OUTPATIENT
Start: 2021-04-26 | End: 2021-06-01 | Stop reason: SDUPTHER

## 2021-04-26 RX ORDER — OMEPRAZOLE 40 MG/1
40 CAPSULE, DELAYED RELEASE ORAL
Qty: 30 CAPSULE | Refills: 5 | Status: SHIPPED | OUTPATIENT
Start: 2021-04-26 | End: 2021-06-28 | Stop reason: SDUPTHER

## 2021-04-26 NOTE — TELEPHONE ENCOUNTER
Kyree Hernandez is calling to request a refill on the following medication(s):    Medication Request:  Requested Prescriptions     Pending Prescriptions Disp Refills    fluticasone (FLONASE) 50 MCG/ACT nasal spray 1 Bottle 2     Sig: SPRAY TWO SPRAYS IN EACH NOSTRIL ONCE DAILY    omeprazole (PRILOSEC) 40 MG delayed release capsule 30 capsule 0     Sig: Take 1 capsule by mouth every morning (before breakfast)       Last Visit Date (If Applicable):  4/0/7558    Next Visit Date:    6/4/2021

## 2021-04-29 DIAGNOSIS — M79.7 FIBROMYALGIA: ICD-10-CM

## 2021-04-30 RX ORDER — TRAMADOL HYDROCHLORIDE 50 MG/1
100 TABLET ORAL EVERY 6 HOURS PRN
Qty: 120 TABLET | Refills: 0 | Status: SHIPPED | OUTPATIENT
Start: 2021-04-30 | End: 2021-06-01 | Stop reason: SDUPTHER

## 2021-05-07 DIAGNOSIS — Z12.12 ENCOUNTER FOR COLORECTAL CANCER SCREENING: ICD-10-CM

## 2021-05-07 DIAGNOSIS — Z12.11 ENCOUNTER FOR COLORECTAL CANCER SCREENING: ICD-10-CM

## 2021-05-13 ENCOUNTER — NURSE TRIAGE (OUTPATIENT)
Dept: OTHER | Facility: CLINIC | Age: 61
End: 2021-05-13

## 2021-05-13 NOTE — TELEPHONE ENCOUNTER
Received call from Meliton Ignacio at [facility] with [employer]. Brief description of triage: see below    Triage indicates for patient to discuss with PCP and Callback by nurse within 1 hour. Contacted Nury Larose with [facility] [place] for appointment scheduling. Care advice provided, patient verbalizes understanding; denies any other questions or concerns; instructed to call back for any new or worsening symptoms. Attention Provider: Thank you for allowing me to participate in the care of your patient. The patient was connected to triage in response to information provided to the [facility]. Please do not respond through this encounter as the response is not directed to a shared pool. Reason for Disposition   Blood glucose > 400 mg/dL (22.2 mmol/L)    Answer Assessment - Initial Assessment Questions  1. BLOOD GLUCOSE: \"What is your blood glucose level? \"       Fasting ;     2. ONSET: \"When did you check the blood glucose? \"      Upon waking up this am.     3. USUAL RANGE: \"What is your glucose level usually? \" (e.g., usual fasting morning value, usual evening value)      Has been running this high for the past few weeks    4. KETONES: \"Do you check for ketones (urine or blood test strips)? \" If yes, ask: \"What does the test show now? \"       N/A    5. TYPE 1 or 2:  \"Do you know what type of diabetes you have? \"  (e.g., Type 1, Type 2, Gestational; doesn't know)       Type 2 per pt    6. INSULIN: \"Do you take insulin? \" \"What type of insulin(s) do you use? What is the mode of delivery? (syringe, pen; injection or pump)? \"       Takes insulin     7. DIABETES PILLS: \"Do you take any pills for your diabetes? \" If yes, ask: \"Have you missed taking any pills recently? \"      Pt denies    8. OTHER SYMPTOMS: \"Do you have any symptoms? \" (e.g., fever, frequent urination, difficulty breathing, dizziness, weakness, vomiting)      Pt states dizziness and nausea    9.  PREGNANCY: \"Is there any chance you are pregnant? \" \"When was your last menstrual period? \"      N/A    Protocols used: DIABETES - HIGH BLOOD SUGAR-ADULT-OH

## 2021-05-18 ENCOUNTER — OFFICE VISIT (OUTPATIENT)
Dept: FAMILY MEDICINE CLINIC | Age: 61
End: 2021-05-18
Payer: MEDICARE

## 2021-05-18 VITALS
BODY MASS INDEX: 43.15 KG/M2 | OXYGEN SATURATION: 97 % | SYSTOLIC BLOOD PRESSURE: 124 MMHG | HEART RATE: 106 BPM | WEIGHT: 279.8 LBS | DIASTOLIC BLOOD PRESSURE: 78 MMHG

## 2021-05-18 DIAGNOSIS — Z12.31 ENCOUNTER FOR SCREENING MAMMOGRAM FOR BREAST CANCER: ICD-10-CM

## 2021-05-18 DIAGNOSIS — M51.36 DDD (DEGENERATIVE DISC DISEASE), LUMBAR: ICD-10-CM

## 2021-05-18 DIAGNOSIS — Z79.4 TYPE 2 DIABETES MELLITUS WITHOUT COMPLICATION, WITH LONG-TERM CURRENT USE OF INSULIN (HCC): Primary | ICD-10-CM

## 2021-05-18 DIAGNOSIS — I10 ESSENTIAL (PRIMARY) HYPERTENSION: ICD-10-CM

## 2021-05-18 DIAGNOSIS — Z12.11 ENCOUNTER FOR SCREENING FOR MALIGNANT NEOPLASM OF COLON: ICD-10-CM

## 2021-05-18 DIAGNOSIS — M48.062 LUMBAR STENOSIS WITH NEUROGENIC CLAUDICATION: ICD-10-CM

## 2021-05-18 DIAGNOSIS — E11.9 TYPE 2 DIABETES MELLITUS WITHOUT COMPLICATION, WITH LONG-TERM CURRENT USE OF INSULIN (HCC): Primary | ICD-10-CM

## 2021-05-18 PROCEDURE — 3051F HG A1C>EQUAL 7.0%<8.0%: CPT | Performed by: FAMILY MEDICINE

## 2021-05-18 PROCEDURE — 2022F DILAT RTA XM EVC RTNOPTHY: CPT | Performed by: FAMILY MEDICINE

## 2021-05-18 PROCEDURE — 3017F COLORECTAL CA SCREEN DOC REV: CPT | Performed by: FAMILY MEDICINE

## 2021-05-18 PROCEDURE — 4004F PT TOBACCO SCREEN RCVD TLK: CPT | Performed by: FAMILY MEDICINE

## 2021-05-18 PROCEDURE — 99214 OFFICE O/P EST MOD 30 MIN: CPT | Performed by: FAMILY MEDICINE

## 2021-05-18 PROCEDURE — G8427 DOCREV CUR MEDS BY ELIG CLIN: HCPCS | Performed by: FAMILY MEDICINE

## 2021-05-18 PROCEDURE — G8417 CALC BMI ABV UP PARAM F/U: HCPCS | Performed by: FAMILY MEDICINE

## 2021-05-18 RX ORDER — INSULIN GLARGINE 100 [IU]/ML
16 INJECTION, SOLUTION SUBCUTANEOUS 2 TIMES DAILY
Qty: 5 PEN | Refills: 3 | Status: SHIPPED | OUTPATIENT
Start: 2021-05-18 | End: 2021-06-28 | Stop reason: SDUPTHER

## 2021-05-18 RX ORDER — METOPROLOL TARTRATE 50 MG/1
50 TABLET, FILM COATED ORAL DAILY
COMMUNITY
Start: 2021-04-05 | End: 2021-09-16 | Stop reason: SDUPTHER

## 2021-05-18 RX ORDER — PEN NEEDLE, DIABETIC 32GX 5/32"
100 NEEDLE, DISPOSABLE MISCELLANEOUS 2 TIMES DAILY
Qty: 100 EACH | Refills: 3 | Status: SHIPPED | OUTPATIENT
Start: 2021-05-18 | End: 2021-06-28 | Stop reason: SDUPTHER

## 2021-05-18 RX ORDER — LANCETS 30 GAUGE
1 EACH MISCELLANEOUS DAILY
Qty: 100 EACH | Refills: 5 | Status: SHIPPED | OUTPATIENT
Start: 2021-05-18 | End: 2021-06-28 | Stop reason: SDUPTHER

## 2021-05-18 ASSESSMENT — ENCOUNTER SYMPTOMS
BOWEL INCONTINENCE: 0
GASTROINTESTINAL NEGATIVE: 1
EYES NEGATIVE: 1
ALLERGIC/IMMUNOLOGIC NEGATIVE: 1
ABDOMINAL PAIN: 0
RESPIRATORY NEGATIVE: 1
ORTHOPNEA: 0
SHORTNESS OF BREATH: 0
BLURRED VISION: 0
BACK PAIN: 1

## 2021-05-18 NOTE — ASSESSMENT & PLAN NOTE
Pain is worsening  Discussed that I do not prescribe chronic pain medications above the level she is at so referred to Dr. Cindy Ash pain medicine

## 2021-05-18 NOTE — PROGRESS NOTES
APSO Progress Note    Date:5/18/2021         Patient Rosalin Siemens     YOB: 1960     Age:60 y.o. Assessment/Plan        Problem List Items Addressed This Visit        Circulatory    Essential (primary) hypertension      Well-controlled, continue current medications, medication adherence emphasized and lifestyle modifications recommended            Endocrine    Type 2 diabetes mellitus without complication, with long-term current use of insulin (HCC) - Primary      Uncontrolled, changes made today: increase Basaglar to 16units BID, medication adherence emphasized and lifestyle modifications recommended         Relevant Medications    insulin glargine (BASAGLAR KWIKPEN) 100 UNIT/ML injection pen    ULTICARE MICRO PEN NEEDLES 32G X 4 MM MISC    Lancets MISC    Other Relevant Orders    HM DIABETES FOOT EXAM (Completed)       Musculoskeletal and Integument    DDD (degenerative disc disease), lumbar      Pain is worsening  Discussed that I do not prescribe chronic pain medications above the level she is at so referred to Dr. Rodriguez Fearing pain medicine         Relevant Orders    External Referral To Pain Clinic       Other    Lumbar stenosis with neurogenic claudication      Same as above         Relevant Orders    External Referral To Pain Clinic      Other Visit Diagnoses     Encounter for screening mammogram for breast cancer        Relevant Orders    KAMINI DIGITAL SCREEN W OR WO CAD BILATERAL    Encounter for screening for malignant neoplasm of colon        Relevant Orders    Cologuard (For External Results Only)           Return in about 3 months (around 8/18/2021). Electronically signed by Carolina Lieberman DO on 5/18/21         Yady Gtz is a 61 y.o. female presenting today for   Chief Complaint   Patient presents with    Blood Sugar Problem    Discuss Medications     tramadol    Discuss Labs     MRI (March)   . Trini Deleon describes a difficult living situation.  Her  and his 3 sons went thru divorces and made a \"pact\" with each other. Now that she is in her 's life the boys are jealous and don't do anything for their father as per Agustín Boo. Maxwell Macdonald is in 45s and lives with them but barely helps dad. She is very stressed    Hypertension  This is a chronic problem. The current episode started more than 1 year ago. The problem has been gradually improving since onset. The problem is controlled. Pertinent negatives include no blurred vision, chest pain, headaches, malaise/fatigue, neck pain, orthopnea, palpitations, peripheral edema, PND, shortness of breath or sweats. Past treatments include diuretics and ACE inhibitors. The current treatment provides significant improvement. Diabetes  She presents for her follow-up diabetic visit. She has type 2 diabetes mellitus. Her disease course has been stable. There are no hypoglycemic associated symptoms. Pertinent negatives for hypoglycemia include no headaches or sweats. There are no diabetic associated symptoms. Pertinent negatives for diabetes include no blurred vision, no chest pain, no weakness and no weight loss. Symptoms are stable. Current diabetic treatment includes intensive insulin program. She is compliant with treatment most of the time. Her weight is fluctuating minimally. Her home blood glucose trend is fluctuating minimally. An ACE inhibitor/angiotensin II receptor blocker is being taken. Back Pain  This is a chronic problem. The current episode started more than 1 year ago. The problem occurs constantly. The problem has been gradually worsening since onset. The pain is present in the lumbar spine. The quality of the pain is described as shooting and stabbing. The pain is severe. Associated symptoms include leg pain.  Pertinent negatives include no abdominal pain, bladder incontinence, bowel incontinence, chest pain, dysuria, fever, headaches, numbness, paresis, paresthesias, pelvic pain, perianal numbness, tingling, weakness or weight loss. She has tried analgesics for the symptoms. The treatment provided moderate relief. Review of Systems   Review of Systems   Constitutional: Negative. Negative for fever, malaise/fatigue and weight loss. HENT: Negative. Eyes: Negative. Negative for blurred vision. Respiratory: Negative. Negative for shortness of breath. Cardiovascular: Negative. Negative for chest pain, palpitations, orthopnea and PND. Gastrointestinal: Negative. Negative for abdominal pain and bowel incontinence. Endocrine: Negative. Genitourinary: Negative. Negative for bladder incontinence, dysuria and pelvic pain. Musculoskeletal: Positive for back pain. Negative for neck pain. Skin: Negative. Allergic/Immunologic: Negative. Neurological: Negative. Negative for tingling, weakness, numbness, headaches and paresthesias. Hematological: Negative. Psychiatric/Behavioral: Negative. All other systems reviewed and are negative. Medications     Current Outpatient Medications   Medication Sig Dispense Refill    insulin glargine (BASAGLAR KWIKPEN) 100 UNIT/ML injection pen Inject 16 Units into the skin 2 times daily 5 pen 3    ULTICARE MICRO PEN NEEDLES 32G X 4 MM MISC Inject 100 each into the skin 2 times daily 100 each 3    Lancets MISC 1 each by Does not apply route daily 100 each 5    metoprolol tartrate (LOPRESSOR) 50 MG tablet Take 50 mg by mouth daily      traMADol (ULTRAM) 50 MG tablet Take 2 tablets by mouth every 6 hours as needed for Pain for up to 30 days. 120 tablet 0    fluticasone (FLONASE) 50 MCG/ACT nasal spray SPRAY TWO SPRAYS IN EACH NOSTRIL ONCE DAILY 1 Bottle 2    omeprazole (PRILOSEC) 40 MG delayed release capsule Take 1 capsule by mouth every morning (before breakfast) 30 capsule 5    blood glucose monitor strips Test 3  times a day as needed for symptoms of irregular blood glucose. Has true metrix meter.  100 strip 5    fluocinonide (LIDEX) 0.05 % cream Apply topically 2 times daily. 120 g 2    mupirocin (BACTROBAN) 2 % ointment AFFECTED AREA(S) THREE TIMES A DAY 15 g 2    lisinopril-hydrochlorothiazide (PRINZIDE;ZESTORETIC) 20-12.5 MG per tablet Take 1 tablet by mouth daily 30 tablet 5    aspirin-acetaminophen-caffeine (EXCEDRIN MIGRAINE) 250-250-65 MG per tablet Take 2 tablets by mouth 2 times daily       No current facility-administered medications for this visit. Past History    Past Medical History:   has no past medical history on file. Social History:   reports that she has been smoking cigarettes. She has a 15.00 pack-year smoking history. She has never used smokeless tobacco. She reports that she does not drink alcohol and does not use drugs. Family History: No family history on file. Surgical History:   Past Surgical History:   Procedure Laterality Date    COLONOSCOPY  06/2016    poor prep, there appears to be some polyps in the colon mixed with stool, could not be removed        Physical Examination      Vitals:  /78   Pulse 106   Wt 279 lb 12.8 oz (126.9 kg)   SpO2 97%   BMI 43.15 kg/m²     Physical Exam  Vitals and nursing note reviewed. Constitutional:       General: She is not in acute distress. Appearance: Normal appearance. She is obese. She is not ill-appearing, toxic-appearing or diaphoretic. HENT:      Head: Normocephalic and atraumatic. Right Ear: Tympanic membrane, ear canal and external ear normal. There is no impacted cerumen. Left Ear: Tympanic membrane, ear canal and external ear normal. There is no impacted cerumen. Nose: Nose normal. No congestion or rhinorrhea. Mouth/Throat:      Mouth: Mucous membranes are moist.      Pharynx: Oropharynx is clear. No oropharyngeal exudate or posterior oropharyngeal erythema. Eyes:      General: No scleral icterus. Right eye: No discharge. Left eye: No discharge.       Extraocular Movements: Extraocular movements intact. Conjunctiva/sclera: Conjunctivae normal.      Pupils: Pupils are equal, round, and reactive to light. Cardiovascular:      Rate and Rhythm: Normal rate and regular rhythm. Pulses: Normal pulses. Heart sounds: Normal heart sounds. No murmur heard. No friction rub. No gallop. Pulmonary:      Effort: Pulmonary effort is normal. No respiratory distress. Breath sounds: Normal breath sounds. No stridor. No wheezing, rhonchi or rales. Chest:      Chest wall: No tenderness. Abdominal:      General: Abdomen is flat. Bowel sounds are normal. There is no distension. Palpations: Abdomen is soft. There is no mass. Tenderness: There is no abdominal tenderness. There is no right CVA tenderness, left CVA tenderness, guarding or rebound. Hernia: No hernia is present. Musculoskeletal:      Cervical back: Normal range of motion and neck supple. Lumbar back: Spasms and tenderness present. No swelling, edema, deformity, lacerations or bony tenderness. Decreased range of motion. Skin:     General: Skin is warm. Capillary Refill: Capillary refill takes less than 2 seconds. Coloration: Skin is not jaundiced or pale. Findings: No bruising, erythema, lesion or rash. Neurological:      General: No focal deficit present. Mental Status: She is alert and oriented to person, place, and time. Mental status is at baseline. Cranial Nerves: No cranial nerve deficit. Sensory: No sensory deficit. Motor: No weakness. Coordination: Coordination normal.      Gait: Gait normal.      Deep Tendon Reflexes: Reflexes normal.   Psychiatric:         Mood and Affect: Mood normal.         Behavior: Behavior normal.         Thought Content:  Thought content normal.         Judgment: Judgment normal.         Labs/Imaging/Diagnostics   Labs:  Hemoglobin A1C   Date Value Ref Range Status   03/04/2021 7.5 % Final       Imaging Last 24

## 2021-05-18 NOTE — ASSESSMENT & PLAN NOTE
Uncontrolled, changes made today: increase Basaglar to 16units BID, medication adherence emphasized and lifestyle modifications recommended

## 2021-05-18 NOTE — PATIENT INSTRUCTIONS
call your doctor if you are having problems. It's also a good idea to know your test results and keep a list of the medicines you take. Where can you learn more? Go to [email or web address] and sign in to your [employer] account. Enter V348 in the [employer] box to learn more about \"Low Back Arthritis: Exercises. \"     If you do not have an account, please click on the \"Sign Up Now\" link. Current as of: November 16, 2020               Content Version: 12.8  © 4918-7250 [employer], Incorporated. Care instructions adapted under license by [facility] ([facility]). If you have questions about a medical condition or this instruction, always ask your healthcare professional. [address] any warranty or liability for your use of this information.

## 2021-05-28 DIAGNOSIS — M79.7 FIBROMYALGIA: ICD-10-CM

## 2021-05-28 DIAGNOSIS — J30.2 SEASONAL ALLERGIC RHINITIS, UNSPECIFIED TRIGGER: ICD-10-CM

## 2021-05-28 NOTE — TELEPHONE ENCOUNTER
Belkis Cardoso is calling to request a refill on the following medication(s):    Medication Request:  Requested Prescriptions     Pending Prescriptions Disp Refills    traMADol (ULTRAM) 50 MG tablet 120 tablet 0     Sig: Take 2 tablets by mouth every 6 hours as needed for Pain for up to 30 days.     fluticasone (FLONASE) 50 MCG/ACT nasal spray 1 Bottle 2     Sig: SPRAY TWO SPRAYS IN EACH NOSTRIL ONCE DAILY       Last Visit Date (If Applicable):  0/31/3214    Next Visit Date:    8/19/2021

## 2021-06-01 DIAGNOSIS — M79.7 FIBROMYALGIA: ICD-10-CM

## 2021-06-01 DIAGNOSIS — J30.2 SEASONAL ALLERGIC RHINITIS, UNSPECIFIED TRIGGER: ICD-10-CM

## 2021-06-01 RX ORDER — FLUTICASONE PROPIONATE 50 MCG
SPRAY, SUSPENSION (ML) NASAL
Qty: 1 BOTTLE | Refills: 2 | Status: SHIPPED | OUTPATIENT
Start: 2021-06-01 | End: 2021-06-28 | Stop reason: SDUPTHER

## 2021-06-01 RX ORDER — TRAMADOL HYDROCHLORIDE 50 MG/1
100 TABLET ORAL EVERY 6 HOURS PRN
Qty: 120 TABLET | Refills: 0 | Status: SHIPPED | OUTPATIENT
Start: 2021-06-01 | End: 2021-06-28 | Stop reason: SDUPTHER

## 2021-06-01 RX ORDER — TRAMADOL HYDROCHLORIDE 50 MG/1
100 TABLET ORAL EVERY 6 HOURS PRN
Qty: 120 TABLET | Refills: 0 | OUTPATIENT
Start: 2021-06-01 | End: 2021-07-01

## 2021-06-01 RX ORDER — FLUTICASONE PROPIONATE 50 MCG
SPRAY, SUSPENSION (ML) NASAL
Qty: 1 BOTTLE | Refills: 2 | OUTPATIENT
Start: 2021-06-01

## 2021-06-28 DIAGNOSIS — M79.7 FIBROMYALGIA: ICD-10-CM

## 2021-06-28 DIAGNOSIS — E11.9 TYPE 2 DIABETES MELLITUS WITHOUT COMPLICATION, WITH LONG-TERM CURRENT USE OF INSULIN (HCC): ICD-10-CM

## 2021-06-28 DIAGNOSIS — Z79.4 TYPE 2 DIABETES MELLITUS WITHOUT COMPLICATION, WITH LONG-TERM CURRENT USE OF INSULIN (HCC): ICD-10-CM

## 2021-06-28 DIAGNOSIS — J30.2 SEASONAL ALLERGIC RHINITIS, UNSPECIFIED TRIGGER: ICD-10-CM

## 2021-06-28 DIAGNOSIS — K21.9 GASTROESOPHAGEAL REFLUX DISEASE, UNSPECIFIED WHETHER ESOPHAGITIS PRESENT: ICD-10-CM

## 2021-06-28 RX ORDER — INSULIN GLARGINE 100 [IU]/ML
16 INJECTION, SOLUTION SUBCUTANEOUS 2 TIMES DAILY
Qty: 5 PEN | Refills: 3 | Status: SHIPPED | OUTPATIENT
Start: 2021-06-28 | End: 2021-08-13 | Stop reason: SDUPTHER

## 2021-06-28 RX ORDER — PEN NEEDLE, DIABETIC 32GX 5/32"
100 NEEDLE, DISPOSABLE MISCELLANEOUS 2 TIMES DAILY
Qty: 100 EACH | Refills: 3 | Status: SHIPPED | OUTPATIENT
Start: 2021-06-28

## 2021-06-28 RX ORDER — FLUTICASONE PROPIONATE 50 MCG
SPRAY, SUSPENSION (ML) NASAL
Qty: 1 BOTTLE | Refills: 2 | Status: SHIPPED | OUTPATIENT
Start: 2021-06-28 | End: 2021-07-27 | Stop reason: SDUPTHER

## 2021-06-28 RX ORDER — OMEPRAZOLE 40 MG/1
40 CAPSULE, DELAYED RELEASE ORAL
Qty: 30 CAPSULE | Refills: 5 | Status: SHIPPED | OUTPATIENT
Start: 2021-06-28 | End: 2021-08-19 | Stop reason: SDUPTHER

## 2021-06-28 RX ORDER — LANCETS 30 GAUGE
EACH MISCELLANEOUS
Qty: 100 EACH | Refills: 5 | Status: SHIPPED | OUTPATIENT
Start: 2021-06-28

## 2021-06-28 RX ORDER — TRAMADOL HYDROCHLORIDE 50 MG/1
100 TABLET ORAL EVERY 6 HOURS PRN
Qty: 120 TABLET | Refills: 0 | Status: SHIPPED | OUTPATIENT
Start: 2021-07-01 | End: 2021-07-27 | Stop reason: SDUPTHER

## 2021-06-28 NOTE — TELEPHONE ENCOUNTER
Pt's sugar is  Higher and she has had to take more insulin. She has been taking 4 shots per day. Jamessusannaeusebia Abdielwillie is calling to request a refill on the following medication(s):    Medication Request:  Requested Prescriptions     Pending Prescriptions Disp Refills    insulin glargine (BASAGLAR KWIKPEN) 100 UNIT/ML injection pen 5 pen 3     Sig: Inject 16 Units into the skin 2 times daily    ULTICARE MICRO PEN NEEDLES 32G X 4 MM MISC 100 each 3     Sig: Inject 100 each into the skin 2 times daily    Lancets MISC 100 each 5     Sig: Pt testing twice daily    fluticasone (FLONASE) 50 MCG/ACT nasal spray 1 Bottle 2     Sig: SPRAY TWO SPRAYS IN EACH NOSTRIL ONCE DAILY    omeprazole (PRILOSEC) 40 MG delayed release capsule 30 capsule 5     Sig: Take 1 capsule by mouth every morning (before breakfast)    traMADol (ULTRAM) 50 MG tablet 120 tablet 0     Sig: Take 2 tablets by mouth every 6 hours as needed for Pain for up to 30 days.        Last Visit Date (If Applicable):  8/65/6413    Next Visit Date:    8/19/2021

## 2021-06-30 NOTE — TELEPHONE ENCOUNTER
Patient called. She hasn't received the basaglar from the pharmacy. She thinks she needs more units 17 or 18 some days is what she uses. She is scared she won't have enough    Will call pharmacy to see what the hold up is.

## 2021-06-30 NOTE — TELEPHONE ENCOUNTER
Pharmacy states they have faxed this to us 2x already  I checked the number they were faxing to and it was our main fax number [phone]  I gave the pharmacist the triage fax# so I can walk it over to the clinic.

## 2021-07-07 ENCOUNTER — TELEPHONE (OUTPATIENT)
Dept: GASTROENTEROLOGY | Age: 61
End: 2021-07-07

## 2021-07-14 ENCOUNTER — TELEPHONE (OUTPATIENT)
Dept: FAMILY MEDICINE CLINIC | Age: 61
End: 2021-07-14

## 2021-07-14 NOTE — TELEPHONE ENCOUNTER
Vilma from Dr. Rizo Gandhi office called stating that they are not accepting new patient with this insurance at this time.  Thanks

## 2021-07-26 DIAGNOSIS — M79.7 FIBROMYALGIA: ICD-10-CM

## 2021-07-26 DIAGNOSIS — J30.2 SEASONAL ALLERGIC RHINITIS, UNSPECIFIED TRIGGER: ICD-10-CM

## 2021-07-26 NOTE — TELEPHONE ENCOUNTER
Andre Montes is calling to request a refill on the following medication(s):    Medication Request:  Requested Prescriptions     Pending Prescriptions Disp Refills    traMADol (ULTRAM) 50 MG tablet 120 tablet 0     Sig: Take 2 tablets by mouth every 6 hours as needed for Pain for up to 30 days.     fluticasone (FLONASE) 50 MCG/ACT nasal spray 1 Bottle 2     Sig: SPRAY TWO SPRAYS IN EACH NOSTRIL ONCE DAILY       Last Visit Date (If Applicable):  6/96/0150    Next Visit Date:    8/16/2021

## 2021-07-27 RX ORDER — FLUTICASONE PROPIONATE 50 MCG
SPRAY, SUSPENSION (ML) NASAL
Qty: 1 BOTTLE | Refills: 2 | Status: SHIPPED | OUTPATIENT
Start: 2021-07-27 | End: 2021-09-16 | Stop reason: SDUPTHER

## 2021-07-27 RX ORDER — TRAMADOL HYDROCHLORIDE 50 MG/1
100 TABLET ORAL EVERY 6 HOURS PRN
Qty: 120 TABLET | Refills: 0 | Status: SHIPPED | OUTPATIENT
Start: 2021-07-27 | End: 2021-08-19

## 2021-08-13 DIAGNOSIS — Z79.4 TYPE 2 DIABETES MELLITUS WITHOUT COMPLICATION, WITH LONG-TERM CURRENT USE OF INSULIN (HCC): ICD-10-CM

## 2021-08-13 DIAGNOSIS — E11.9 TYPE 2 DIABETES MELLITUS WITHOUT COMPLICATION, WITH LONG-TERM CURRENT USE OF INSULIN (HCC): ICD-10-CM

## 2021-08-13 RX ORDER — INSULIN GLARGINE 100 [IU]/ML
INJECTION, SOLUTION SUBCUTANEOUS
Qty: 5 PEN | Refills: 3 | Status: SHIPPED | OUTPATIENT
Start: 2021-08-13 | End: 2021-08-19

## 2021-08-13 NOTE — TELEPHONE ENCOUNTER
Caitlyn Angel is calling to request a refill on the following medication(s):    Medication Request:  Requested Prescriptions     Pending Prescriptions Disp Refills    insulin glargine (BASAGLAR KWIKPEN) 100 UNIT/ML injection pen 5 pen 3     Si units  Two times daily,  Sometimes more if glucose is high       Last Visit Date (If Applicable):  3194    Next Visit Date:    2021

## 2021-08-16 ENCOUNTER — TELEPHONE (OUTPATIENT)
Dept: FAMILY MEDICINE CLINIC | Age: 61
End: 2021-08-16

## 2021-08-16 NOTE — TELEPHONE ENCOUNTER
Patient has an appt scheduled for this week on the 19th and the 20th for the same exact things, left a detailed message on recorder to advise us on which appointment she would like to keep and which one she would like to cancel.

## 2021-08-17 ENCOUNTER — TELEPHONE (OUTPATIENT)
Dept: FAMILY MEDICINE CLINIC | Age: 61
End: 2021-08-17

## 2021-08-17 NOTE — TELEPHONE ENCOUNTER
[address] calling in asking for max amount of basaglar to use for insurance purposes. Directions state 18 units two times daily, sometimes more if glucose is high.  Please advise

## 2021-08-19 ENCOUNTER — OFFICE VISIT (OUTPATIENT)
Dept: FAMILY MEDICINE CLINIC | Age: 61
End: 2021-08-19
Payer: MEDICARE

## 2021-08-19 ENCOUNTER — TELEPHONE (OUTPATIENT)
Dept: FAMILY MEDICINE CLINIC | Age: 61
End: 2021-08-19

## 2021-08-19 VITALS
BODY MASS INDEX: 40.41 KG/M2 | OXYGEN SATURATION: 96 % | SYSTOLIC BLOOD PRESSURE: 130 MMHG | DIASTOLIC BLOOD PRESSURE: 78 MMHG | WEIGHT: 262 LBS | HEART RATE: 90 BPM

## 2021-08-19 DIAGNOSIS — M79.672 BILATERAL FOOT PAIN: ICD-10-CM

## 2021-08-19 DIAGNOSIS — I10 ESSENTIAL (PRIMARY) HYPERTENSION: ICD-10-CM

## 2021-08-19 DIAGNOSIS — M51.36 DDD (DEGENERATIVE DISC DISEASE), LUMBAR: ICD-10-CM

## 2021-08-19 DIAGNOSIS — E11.9 TYPE 2 DIABETES MELLITUS WITHOUT COMPLICATION, WITH LONG-TERM CURRENT USE OF INSULIN (HCC): ICD-10-CM

## 2021-08-19 DIAGNOSIS — M79.671 BILATERAL FOOT PAIN: ICD-10-CM

## 2021-08-19 DIAGNOSIS — M79.7 FIBROMYALGIA: Primary | ICD-10-CM

## 2021-08-19 DIAGNOSIS — Z79.4 TYPE 2 DIABETES MELLITUS WITHOUT COMPLICATION, WITH LONG-TERM CURRENT USE OF INSULIN (HCC): ICD-10-CM

## 2021-08-19 DIAGNOSIS — L84 CALLUS OF FOOT: ICD-10-CM

## 2021-08-19 DIAGNOSIS — M79.7 FIBROMYALGIA: ICD-10-CM

## 2021-08-19 DIAGNOSIS — E53.8 VITAMIN B12 DEFICIENCY: ICD-10-CM

## 2021-08-19 DIAGNOSIS — K21.9 GASTROESOPHAGEAL REFLUX DISEASE, UNSPECIFIED WHETHER ESOPHAGITIS PRESENT: ICD-10-CM

## 2021-08-19 PROCEDURE — 3051F HG A1C>EQUAL 7.0%<8.0%: CPT | Performed by: FAMILY MEDICINE

## 2021-08-19 PROCEDURE — G8417 CALC BMI ABV UP PARAM F/U: HCPCS | Performed by: FAMILY MEDICINE

## 2021-08-19 PROCEDURE — 4004F PT TOBACCO SCREEN RCVD TLK: CPT | Performed by: FAMILY MEDICINE

## 2021-08-19 PROCEDURE — 2022F DILAT RTA XM EVC RTNOPTHY: CPT | Performed by: FAMILY MEDICINE

## 2021-08-19 PROCEDURE — 99214 OFFICE O/P EST MOD 30 MIN: CPT | Performed by: FAMILY MEDICINE

## 2021-08-19 PROCEDURE — G8427 DOCREV CUR MEDS BY ELIG CLIN: HCPCS | Performed by: FAMILY MEDICINE

## 2021-08-19 PROCEDURE — 96372 THER/PROPH/DIAG INJ SC/IM: CPT | Performed by: FAMILY MEDICINE

## 2021-08-19 PROCEDURE — 3017F COLORECTAL CA SCREEN DOC REV: CPT | Performed by: FAMILY MEDICINE

## 2021-08-19 RX ORDER — TRAMADOL HYDROCHLORIDE 50 MG/1
100 TABLET ORAL EVERY 8 HOURS PRN
Qty: 180 TABLET | Refills: 0 | Status: SHIPPED | OUTPATIENT
Start: 2021-08-26 | End: 2021-08-20 | Stop reason: SDUPTHER

## 2021-08-19 RX ORDER — OMEPRAZOLE 40 MG/1
40 CAPSULE, DELAYED RELEASE ORAL
Qty: 30 CAPSULE | Refills: 5 | Status: SHIPPED | OUTPATIENT
Start: 2021-08-19 | End: 2021-09-20 | Stop reason: SDUPTHER

## 2021-08-19 RX ORDER — CYANOCOBALAMIN 1000 UG/ML
1000 INJECTION INTRAMUSCULAR; SUBCUTANEOUS ONCE
Status: COMPLETED | OUTPATIENT
Start: 2021-08-19 | End: 2021-08-19

## 2021-08-19 RX ORDER — INSULIN GLARGINE 100 [IU]/ML
20 INJECTION, SOLUTION SUBCUTANEOUS 2 TIMES DAILY
Qty: 5 PEN | Refills: 3 | Status: SHIPPED | OUTPATIENT
Start: 2021-08-19 | End: 2021-09-29 | Stop reason: SDUPTHER

## 2021-08-19 RX ADMIN — CYANOCOBALAMIN 1000 MCG: 1000 INJECTION INTRAMUSCULAR; SUBCUTANEOUS at 13:33

## 2021-08-19 SDOH — ECONOMIC STABILITY: FOOD INSECURITY: WITHIN THE PAST 12 MONTHS, YOU WORRIED THAT YOUR FOOD WOULD RUN OUT BEFORE YOU GOT MONEY TO BUY MORE.: NEVER TRUE

## 2021-08-19 SDOH — ECONOMIC STABILITY: FOOD INSECURITY: WITHIN THE PAST 12 MONTHS, THE FOOD YOU BOUGHT JUST DIDN'T LAST AND YOU DIDN'T HAVE MONEY TO GET MORE.: NEVER TRUE

## 2021-08-19 ASSESSMENT — ENCOUNTER SYMPTOMS
BOWEL INCONTINENCE: 0
BLURRED VISION: 0
BACK PAIN: 1
RESPIRATORY NEGATIVE: 1
GASTROINTESTINAL NEGATIVE: 1
SHORTNESS OF BREATH: 0
ALLERGIC/IMMUNOLOGIC NEGATIVE: 1
ABDOMINAL PAIN: 0
ORTHOPNEA: 0
EYES NEGATIVE: 1

## 2021-08-19 ASSESSMENT — PATIENT HEALTH QUESTIONNAIRE - PHQ9
SUM OF ALL RESPONSES TO PHQ9 QUESTIONS 1 & 2: 0
SUM OF ALL RESPONSES TO PHQ QUESTIONS 1-9: 0
2. FEELING DOWN, DEPRESSED OR HOPELESS: 0
SUM OF ALL RESPONSES TO PHQ QUESTIONS 1-9: 0
SUM OF ALL RESPONSES TO PHQ QUESTIONS 1-9: 0
1. LITTLE INTEREST OR PLEASURE IN DOING THINGS: 0

## 2021-08-19 ASSESSMENT — SOCIAL DETERMINANTS OF HEALTH (SDOH): HOW HARD IS IT FOR YOU TO PAY FOR THE VERY BASICS LIKE FOOD, HOUSING, MEDICAL CARE, AND HEATING?: NOT HARD AT ALL

## 2021-08-19 NOTE — ASSESSMENT & PLAN NOTE
Uncontrolled, changes made today: increase Basaglar to 20units BID, medication adherence emphasized and lifestyle modifications recommended

## 2021-08-19 NOTE — TELEPHONE ENCOUNTER
Since pts pain medication changed,  The pharmacy needs a call for the office to fill it on the 21 st of August because she still has enough to last until then.

## 2021-08-19 NOTE — ASSESSMENT & PLAN NOTE
At goal, continue current medications, continue current treatment plan, medication adherence emphasized and lifestyle modifications recommended

## 2021-08-19 NOTE — ASSESSMENT & PLAN NOTE
Uncontrolled, changes made today: slight increase in pain medication but advised no further increases can be given

## 2021-08-19 NOTE — PATIENT INSTRUCTIONS
doctor if:    · You do not get better as expected. Where can you learn more? Go to [email or web address] and sign in to your [facility] account. Enter R244 in the [facility] box to learn more about \"Corns and Calluses: Care Instructions. \"     If you do not have an account, please click on the \"Sign Up Now\" link. Current as of: March 3, 2021               Content Version: 12.9  © 6472-5225 [employer]wise, Incorporated. Care instructions adapted under license by [facility] ([facility]). If you have questions about a medical condition or this instruction, always ask your healthcare professional. [address] any warranty or liability for your use of this information.

## 2021-08-19 NOTE — PROGRESS NOTES
[facility] Progress Note    Date:8/19/2021         Patient Renee Barreto     YOB: 1960     Age:60 y.o. Assessment/Plan        Problem List Items Addressed This Visit        Circulatory    Essential (primary) hypertension      At goal, continue current medications, continue current treatment plan, medication adherence emphasized and lifestyle modifications recommended            Digestive    Gastroesophageal reflux disease    Relevant Medications    omeprazole (PRILOSEC) 40 MG delayed release capsule       Endocrine    Type 2 diabetes mellitus without complication, with long-term current use of insulin (HCC)      Uncontrolled, changes made today: increase Basaglar to 20units BID, medication adherence emphasized and lifestyle modifications recommended         Relevant Medications    insulin glargine (BASAGLAR KWIKPEN) 100 UNIT/ML injection pen       Musculoskeletal and Integument    DDD (degenerative disc disease), lumbar      Uncontrolled, changes made today: slight increase in pain medication but advised no further increases can be given         Relevant Medications    traMADol (ULTRAM) 50 MG tablet (Start on 8/26/2021)       Other    Fibromyalgia - Primary    Relevant Medications    traMADol (ULTRAM) 50 MG tablet (Start on 8/26/2021)    Vitamin B12 deficiency    Relevant Medications    cyanocobalamin injection 1,000 mcg (Start on 8/19/2021  1:45 PM)      Other Visit Diagnoses     Bilateral foot pain        Relevant Orders    Fela Rivas, [facility], Podiatry, [employer] of foot        Relevant Orders    [facility], Jolane Gosselin, [facility], [address] [place] [address]       Patient refused health maintenance measures at this time according to chart. Will readdress at next visit. Return in about 3 months (around 11/19/2021).     Electronically signed by Cristel Cabrajal DO on 8/19/21         Yady     Pao Roe is a 61 y.o. female presenting today for   Chief Complaint   Patient presents with    Foot Pain   . Just started a part time job () and is on her feet a lot more now and having calluses    Foot Pain   The pain is present in the left foot and right foot. This is a chronic problem. The current episode started 1 to 4 weeks ago. The problem occurs daily. The problem has been gradually worsening. The quality of the pain is described as aching. The pain is severe. Pertinent negatives include no fever, inability to bear weight, itching, joint locking, joint swelling, limited range of motion, numbness, stiffness or tingling. She has tried rest for the symptoms. The treatment provided no relief. Hypertension  This is a chronic problem. The current episode started more than 1 year ago. The problem has been gradually improving since onset. The problem is controlled. Pertinent negatives include no blurred vision, chest pain, headaches, malaise/fatigue, neck pain, orthopnea, palpitations, peripheral edema, PND, shortness of breath or sweats. Past treatments include diuretics and ACE inhibitors. The current treatment provides significant improvement. Diabetes  She presents for her follow-up diabetic visit. She has type 2 diabetes mellitus. Her disease course has been stable. There are no hypoglycemic associated symptoms. Pertinent negatives for hypoglycemia include no headaches or sweats. There are no diabetic associated symptoms. Pertinent negatives for diabetes include no blurred vision, no chest pain, no weakness and no weight loss. Symptoms are stable. Current diabetic treatment includes intensive insulin program. She is compliant with treatment most of the time. Her weight is fluctuating minimally. Her home blood glucose trend is fluctuating minimally. An ACE inhibitor/angiotensin II receptor blocker is being taken. Back Pain  This is a chronic problem. The current episode started more than 1 year ago. The problem occurs constantly.  The problem has been gradually worsening since onset. The pain is present in the lumbar spine. The quality of the pain is described as shooting and stabbing. The pain is severe. Associated symptoms include leg pain. Pertinent negatives include no abdominal pain, bladder incontinence, bowel incontinence, chest pain, dysuria, fever, headaches, numbness, paresis, paresthesias, pelvic pain, perianal numbness, tingling, weakness or weight loss. She has tried analgesics for the symptoms. The treatment provided moderate relief. Review of Systems   Review of Systems   Constitutional: Negative. Negative for fever, malaise/fatigue and weight loss. HENT: Negative. Eyes: Negative. Negative for blurred vision. Respiratory: Negative. Negative for shortness of breath. Cardiovascular: Negative. Negative for chest pain, palpitations, orthopnea and PND. Gastrointestinal: Negative. Negative for abdominal pain and bowel incontinence. Endocrine: Negative. Genitourinary: Negative. Negative for bladder incontinence, dysuria and pelvic pain. Musculoskeletal: Positive for back pain. Negative for neck pain and stiffness. Skin: Negative. Negative for itching. Allergic/Immunologic: Negative. Neurological: Negative. Negative for tingling, weakness, numbness, headaches and paresthesias. Hematological: Negative. Psychiatric/Behavioral: Negative. All other systems reviewed and are negative. Medications     Current Outpatient Medications   Medication Sig Dispense Refill    [START ON 8/26/2021] traMADol (ULTRAM) 50 MG tablet Take 2 tablets by mouth every 8 hours as needed for Pain for up to 30 days.  180 tablet 0    insulin glargine (BASAGLAR KWIKPEN) 100 UNIT/ML injection pen Inject 20 Units into the skin 2 times daily 5 pen 3    omeprazole (PRILOSEC) 40 MG delayed release capsule Take 1 capsule by mouth every morning (before breakfast) 30 capsule 5    fluticasone (FLONASE) 50 MCG/ACT nasal spray SPRAY TWO SPRAYS IN EACH NOSTRIL ONCE DAILY 1 Bottle 2    ULTICARE MICRO PEN NEEDLES 32G X 4 MM MISC Inject 100 each into the skin 2 times daily 100 each 3    Lancets MISC Pt testing twice daily 100 each 5    metoprolol tartrate (LOPRESSOR) 50 MG tablet Take 50 mg by mouth daily      blood glucose monitor strips Test 3  times a day as needed for symptoms of irregular blood glucose. Has true metrix meter. 100 strip 5    fluocinonide (LIDEX) 0.05 % cream Apply topically 2 times daily. 120 g 2    mupirocin (BACTROBAN) 2 % ointment AFFECTED AREA(S) THREE TIMES A DAY 15 g 2    lisinopril-hydrochlorothiazide (PRINZIDE;ZESTORETIC) 20-12.5 MG per tablet Take 1 tablet by mouth daily 30 tablet 5    aspirin-acetaminophen-caffeine (EXCEDRIN MIGRAINE) 250-250-65 MG per tablet Take 2 tablets by mouth 2 times daily       Current Facility-Administered Medications   Medication Dose Route Frequency Provider Last Rate Last Admin    cyanocobalamin injection 1,000 mcg  1,000 mcg Intramuscular Once Acey Gehrig, DO           Past History    Past Medical History:   has no past medical history on file. Social History:   reports that she has been smoking cigarettes. She has a 15.00 pack-year smoking history. She has never used smokeless tobacco. She reports that she does not drink alcohol and does not use drugs. Family History: History reviewed. No pertinent family history. Surgical History:   Past Surgical History:   Procedure Laterality Date    COLONOSCOPY  06/2016    poor prep, there appears to be some polyps in the colon mixed with stool, could not be removed        Physical Examination      Vitals:  /78 (Site: Right Upper Arm, Position: Sitting, Cuff Size: Medium Adult)   Pulse 90   Wt 262 lb (118.8 kg)   SpO2 96%   BMI 40.41 kg/m²     Physical Exam  Vitals and nursing note reviewed. Constitutional:       General: She is not in acute distress. Appearance: Normal appearance. She is obese.  She is not ill-appearing, toxic-appearing or diaphoretic. HENT:      Head: Normocephalic and atraumatic. Right Ear: Tympanic membrane, ear canal and external ear normal. There is no impacted cerumen. Left Ear: Tympanic membrane, ear canal and external ear normal. There is no impacted cerumen. Nose: Nose normal. No congestion or rhinorrhea. Mouth/Throat:      Mouth: Mucous membranes are moist.      Pharynx: Oropharynx is clear. No oropharyngeal exudate or posterior oropharyngeal erythema. Eyes:      General: No scleral icterus. Right eye: No discharge. Left eye: No discharge. Extraocular Movements: Extraocular movements intact. Conjunctiva/sclera: Conjunctivae normal.      Pupils: Pupils are equal, round, and reactive to light. Cardiovascular:      Rate and Rhythm: Normal rate and regular rhythm. Pulses: Normal pulses. Heart sounds: Normal heart sounds. No murmur heard. No friction rub. No gallop. Pulmonary:      Effort: Pulmonary effort is normal. No respiratory distress. Breath sounds: Normal breath sounds. No stridor. No wheezing, rhonchi or rales. Chest:      Chest wall: No tenderness. Abdominal:      General: Abdomen is flat. Bowel sounds are normal. There is no distension. Palpations: Abdomen is soft. There is no mass. Tenderness: There is no abdominal tenderness. There is no right CVA tenderness, left CVA tenderness, guarding or rebound. Hernia: No hernia is present. Musculoskeletal:      Cervical back: Normal range of motion and neck supple. Lumbar back: Spasms and tenderness present. No swelling, edema, deformity, lacerations or bony tenderness. Decreased range of motion. Feet:    Feet:      Left foot:      Skin integrity: Callus present. Skin:     General: Skin is warm. Capillary Refill: Capillary refill takes less than 2 seconds. Coloration: Skin is not jaundiced or pale. Findings: No bruising, erythema, lesion or rash. Neurological:      General: No focal deficit present. Mental Status: She is alert and oriented to person, place, and time. Mental status is at baseline. Cranial Nerves: No cranial nerve deficit. Sensory: No sensory deficit. Motor: No weakness. Coordination: Coordination normal.      Gait: Gait normal.      Deep Tendon Reflexes: Reflexes normal.   Psychiatric:         Mood and Affect: Mood normal.         Behavior: Behavior normal.         Thought Content: Thought content normal.         Judgment: Judgment normal.         Labs/Imaging/Diagnostics   Labs:  Hemoglobin A1C   Date Value Ref Range Status   03/04/2021 7.5 % Final       Imaging Last 24 Hours:  MRI LUMBAR SPINE WO CONTRAST  Narrative: EXAMINATION:  MRI OF THE LUMBAR SPINE WITHOUT CONTRAST, 3/23/2021 3:18 pm    TECHNIQUE:  Multiplanar multisequence MRI of the lumbar spine was performed without the  administration of intravenous contrast.    COMPARISON:  03/02/2015    HISTORY:  ORDERING SYSTEM PROVIDED HISTORY: Lumbar stenosis with neurogenic claudication  TECHNOLOGIST PROVIDED HISTORY:  see icd10  Reason for Exam: low back pain w/bilateral sciatica pain  Acuity: Chronic  Type of Exam: Subsequent/Follow-up    FINDINGS:  BONES/ALIGNMENT: Lumbar spine alignment is normal.  Lumbar vertebral bodies  are normal in height. No acute lumbar spine fracture. The marrow signal  pattern throughout the lumbar spine is normal.    SPINAL CORD: The conus terminates normally. SOFT TISSUES: No paraspinal mass identified. L1-L2: There is no significant disc herniation, spinal canal stenosis or  neural foraminal narrowing. L2-L3: There is no significant disc herniation, spinal canal stenosis or  neural foraminal narrowing. Mild facet joint DJD. L3-L4: Mild DDD with disc height loss and desiccation. Bilateral facet joint  DJD and ligamentum flavum thickening. Mild spinal canal stenosis.   No  significant neural foraminal stenosis. L4-L5: Mild DDD. Posterior annular fissure. Diffuse disc bulge measures 3  mm. Bilateral facet joint DJD and ligamentum flavum thickening. Mild spinal  canal stenosis. No significant neural foraminal stenosis. L5-S1: There is no significant disc herniation, spinal canal stenosis or  neural foraminal narrowing. Bilateral facet joint DJD. Impression: Mild scattered degenerate spondylosis throughout the lumbar spine without  high-grade lumbar spinal canal or high-grade neural foraminal stenosis.

## 2021-08-20 RX ORDER — TRAMADOL HYDROCHLORIDE 50 MG/1
100 TABLET ORAL EVERY 8 HOURS PRN
Qty: 180 TABLET | Refills: 0 | Status: SHIPPED | OUTPATIENT
Start: 2021-08-26 | End: 2021-09-20 | Stop reason: SDUPTHER

## 2021-08-20 NOTE — TELEPHONE ENCOUNTER
Rodolfo Cunningham is calling to request a refill on the following medication(s):    Medication Request:  Requested Prescriptions     Pending Prescriptions Disp Refills    traMADol (ULTRAM) 50 MG tablet 180 tablet 0     Sig: Take 2 tablets by mouth every 8 hours as needed for Pain for up to 30 days.        Last Visit Date (If Applicable):  1/90/4039    Next Visit Date:    11/10/2021      Last Refill:  7/27/2021

## 2021-09-10 ENCOUNTER — TELEPHONE (OUTPATIENT)
Dept: FAMILY MEDICINE CLINIC | Age: 61
End: 2021-09-10

## 2021-09-10 NOTE — TELEPHONE ENCOUNTER
Haritha Harris was contacted as part of mammography outreach. Provided patient with the phone number for radiant scheduling.      Phil

## 2021-09-16 DIAGNOSIS — I10 ESSENTIAL (PRIMARY) HYPERTENSION: Primary | ICD-10-CM

## 2021-09-16 DIAGNOSIS — M79.7 FIBROMYALGIA: ICD-10-CM

## 2021-09-16 DIAGNOSIS — J30.2 SEASONAL ALLERGIC RHINITIS, UNSPECIFIED TRIGGER: ICD-10-CM

## 2021-09-16 DIAGNOSIS — K21.9 GASTROESOPHAGEAL REFLUX DISEASE, UNSPECIFIED WHETHER ESOPHAGITIS PRESENT: ICD-10-CM

## 2021-09-17 DIAGNOSIS — Z79.4 TYPE 2 DIABETES MELLITUS WITHOUT COMPLICATION, WITH LONG-TERM CURRENT USE OF INSULIN (HCC): ICD-10-CM

## 2021-09-17 DIAGNOSIS — E11.9 TYPE 2 DIABETES MELLITUS WITHOUT COMPLICATION, WITH LONG-TERM CURRENT USE OF INSULIN (HCC): ICD-10-CM

## 2021-09-17 DIAGNOSIS — E66.01 MORBID OBESITY DUE TO EXCESS CALORIES (HCC): ICD-10-CM

## 2021-09-17 NOTE — TELEPHONE ENCOUNTER
Yvonne Galloway is calling to request a refill on the following medication(s):    Medication Request:  Requested Prescriptions     Pending Prescriptions Disp Refills    blood glucose monitor strips 150 strip 1     Sig: Test 4  times a day as needed for symptoms of irregular blood glucose. Has true metrix meter.        Last Visit Date (If Applicable):  7/09/0271    Next Visit Date:    11/10/2021

## 2021-09-20 RX ORDER — TRAMADOL HYDROCHLORIDE 50 MG/1
100 TABLET ORAL EVERY 8 HOURS PRN
Qty: 180 TABLET | Refills: 0 | Status: SHIPPED | OUTPATIENT
Start: 2021-09-20 | End: 2021-10-18 | Stop reason: SDUPTHER

## 2021-09-20 RX ORDER — OMEPRAZOLE 40 MG/1
40 CAPSULE, DELAYED RELEASE ORAL
Qty: 30 CAPSULE | Refills: 5 | Status: SHIPPED | OUTPATIENT
Start: 2021-09-20 | End: 2021-12-13 | Stop reason: SDUPTHER

## 2021-09-20 RX ORDER — METOPROLOL TARTRATE 50 MG/1
50 TABLET, FILM COATED ORAL DAILY
Qty: 60 TABLET | Refills: 5 | Status: SHIPPED | OUTPATIENT
Start: 2021-09-20 | End: 2021-11-10 | Stop reason: SDUPTHER

## 2021-09-20 RX ORDER — GLUCOSAMINE HCL/CHONDROITIN SU 500-400 MG
CAPSULE ORAL
Qty: 150 STRIP | Refills: 1 | Status: SHIPPED | OUTPATIENT
Start: 2021-09-20 | End: 2021-09-28 | Stop reason: SDUPTHER

## 2021-09-20 RX ORDER — FLUTICASONE PROPIONATE 50 MCG
SPRAY, SUSPENSION (ML) NASAL
Qty: 1 EACH | Refills: 5 | Status: SHIPPED | OUTPATIENT
Start: 2021-09-20 | End: 2021-11-10 | Stop reason: SDUPTHER

## 2021-09-28 DIAGNOSIS — E11.9 TYPE 2 DIABETES MELLITUS WITHOUT COMPLICATION, WITH LONG-TERM CURRENT USE OF INSULIN (HCC): ICD-10-CM

## 2021-09-28 DIAGNOSIS — Z79.4 TYPE 2 DIABETES MELLITUS WITHOUT COMPLICATION, WITH LONG-TERM CURRENT USE OF INSULIN (HCC): ICD-10-CM

## 2021-09-28 DIAGNOSIS — E66.01 MORBID OBESITY DUE TO EXCESS CALORIES (HCC): ICD-10-CM

## 2021-09-28 NOTE — TELEPHONE ENCOUNTER
Wrong test strips sent last time        Guera Jeong is calling to request a refill on the following medication(s):    Medication Request:  Requested Prescriptions     Pending Prescriptions Disp Refills    blood glucose monitor strips 150 strip 1     Sig: Test 4  times a day as needed for symptoms of irregular blood glucose.  Pt Has One Touch Ultra    insulin glargine (BASAGLAR KWIKPEN) 100 UNIT/ML injection pen 5 pen 3     Sig: Inject 20 Units into the skin 2 times daily       Last Visit Date (If Applicable):  2/45/1369    Next Visit Date:    11/10/2021

## 2021-09-29 RX ORDER — INSULIN GLARGINE 100 [IU]/ML
20 INJECTION, SOLUTION SUBCUTANEOUS 2 TIMES DAILY
Qty: 5 PEN | Refills: 3 | Status: SHIPPED | OUTPATIENT
Start: 2021-09-29 | End: 2021-10-07 | Stop reason: SDUPTHER

## 2021-09-29 RX ORDER — GLUCOSAMINE HCL/CHONDROITIN SU 500-400 MG
CAPSULE ORAL
Qty: 150 STRIP | Refills: 1 | Status: SHIPPED | OUTPATIENT
Start: 2021-09-29 | End: 2021-11-10 | Stop reason: SDUPTHER

## 2021-10-01 ENCOUNTER — TELEPHONE (OUTPATIENT)
Dept: FAMILY MEDICINE CLINIC | Age: 61
End: 2021-10-01

## 2021-10-01 NOTE — TELEPHONE ENCOUNTER
Patient called stating she is waiting on a refill for a insulin glargine (BASAGLAR KWIKPEN) 100 UNIT/ML injection pen and the pharmacy informed her they are waiting for a PA approval from Dr. Michael Waters office, please advise.

## 2021-10-05 ENCOUNTER — TELEPHONE (OUTPATIENT)
Dept: FAMILY MEDICINE CLINIC | Age: 61
End: 2021-10-05

## 2021-10-07 DIAGNOSIS — Z79.4 TYPE 2 DIABETES MELLITUS WITHOUT COMPLICATION, WITH LONG-TERM CURRENT USE OF INSULIN (HCC): ICD-10-CM

## 2021-10-07 DIAGNOSIS — E11.9 TYPE 2 DIABETES MELLITUS WITHOUT COMPLICATION, WITH LONG-TERM CURRENT USE OF INSULIN (HCC): Primary | ICD-10-CM

## 2021-10-07 DIAGNOSIS — Z79.4 TYPE 2 DIABETES MELLITUS WITHOUT COMPLICATION, WITH LONG-TERM CURRENT USE OF INSULIN (HCC): Primary | ICD-10-CM

## 2021-10-07 DIAGNOSIS — E11.9 TYPE 2 DIABETES MELLITUS WITHOUT COMPLICATION, WITH LONG-TERM CURRENT USE OF INSULIN (HCC): ICD-10-CM

## 2021-10-07 RX ORDER — INSULIN GLARGINE 100 [IU]/ML
20 INJECTION, SOLUTION SUBCUTANEOUS 2 TIMES DAILY
Qty: 5 PEN | Refills: 2 | Status: SHIPPED | OUTPATIENT
Start: 2021-10-07 | End: 2022-01-14 | Stop reason: SDUPTHER

## 2021-10-07 RX ORDER — INSULIN GLARGINE 100 [IU]/ML
20 INJECTION, SOLUTION SUBCUTANEOUS 2 TIMES DAILY
Qty: 5 PEN | Refills: 3 | Status: SHIPPED | OUTPATIENT
Start: 2021-10-07 | End: 2021-10-07 | Stop reason: ALTCHOICE

## 2021-10-07 NOTE — TELEPHONE ENCOUNTER
cherri has not received an ok from insurance to fill [facility]a,  She is totally out of insulin ,  She wants to know if you will prescribe lantus?

## 2021-10-07 NOTE — TELEPHONE ENCOUNTER
----- Message from Kenny Zaragoza sent at 10/6/2021 11:14 AM EDT -----  Subject: Refill Request    QUESTIONS  Name of Medication? insulin glargine (BASAGLAR KWIKPEN) 100 UNIT/ML   injection pen  Patient-reported dosage and instructions? 100mg  How many days do you have left? 1  Preferred Pharmacy? [facility]  Pharmacy phone number (if available)? [phone]  Additional Information for Provider? Patient has 1 shot left and has been   calling and is getting response.   ---------------------------------------------------------------------------  --------------  CALL BACK INFO  What is the best way for the office to contact you? OK to leave message on   voicemail  Preferred Call Back Phone Number?  [phone]

## 2021-10-08 NOTE — TELEPHONE ENCOUNTER
Called patient and she states is NOT on Basaglar, she is on LANTUS because her insurance would not basaglar. She has already picked up her lantus.

## 2021-10-18 DIAGNOSIS — M79.7 FIBROMYALGIA: ICD-10-CM

## 2021-10-18 RX ORDER — TRAMADOL HYDROCHLORIDE 50 MG/1
100 TABLET ORAL EVERY 8 HOURS PRN
Qty: 180 TABLET | Refills: 0 | Status: SHIPPED | OUTPATIENT
Start: 2021-10-20 | End: 2021-11-10 | Stop reason: SDUPTHER

## 2021-10-18 NOTE — TELEPHONE ENCOUNTER
----- Message from Davidwilliam Sharcaris sent at 10/18/2021 11:38 AM EDT -----  Subject: Refill Request    QUESTIONS  Name of Medication? traMADol (ULTRAM) 50 MG tablet  Patient-reported dosage and instructions? 50 mg   How many days do you have left? 4  [facility]  Pharmacy phone number (if available)? [phone]  ---------------------------------------------------------------------------  --------------,  Name of Medication? fluticasone (FLONASE) 50 MCG/ACT nasal spray  Patient-reported dosage and instructions? 4 -5 times within 24 hours   How many days do you have left? 0  Preferred Pharmacy? [facility]  Pharmacy phone number (if available)? [phone]  ---------------------------------------------------------------------------  --------------,  Name of Medication? omeprazole (PRILOSEC) 40 MG delayed release capsule  Patient-reported dosage and instructions? 2 daily   How many days do you have left? 0  [facility]  Pharmacy phone number (if available)? [phone]  ---------------------------------------------------------------------------  --------------  CALL BACK INFO  What is the best way for the office to contact you? OK to leave message on   voicemail  Preferred Call Back Phone Number?  [phone]
History of Present Illness:    Reason for Admission: Bronchiolitis     Respiratory Distress    History of Present Illness:     Ghazal is a healthy 1y6m old female with no significant PMHx who was on her usual state of health until 3 days before the admission when mother noticed she started sneezing frequently. The following day (04/06) she had runny nose and sneezing to the point that she was unable to sleep at night due to severe congestion. The following day she started also coughing and mother reports she had about 4 episodes of NBNB emesis mostly post-tussive. Mother took her to [facility] 2 days before the admission and was given albuterol and orapred after which she improved, however, mother noticed yesterday she was looking tired, breathing fast and in distress for which she decided to take her to the ED.         Mother denies any fevers, but refers she has been giving her motrin for warm body temperatures of 99.5 F.         Since yesterday mother said Ghazal is refusing feeds and had very poor PO intake.         In the past Ghazal was given albuterol for viral related wheezing/ reactive airway disease, but she was never formally diagnosed with asthma. Mother does have a hx of asthma herself.         ED Course: Albuterol trial given without much improvement, responding to racemic epinephrine. Initially placed on NC but escalated to BiPAP due respiratory distress symptoms.        2-CENTRAL HOSPITALIZATION COURSE (04/8/19-        > RESPIRATORY:    Patient started on the ED on BiPAP 10/5 with good tolerance and improvement of respiratory symptoms. She was weaned off on ***        > ID:    Positive for rhino/enterovirus and adenovirus. No abx needed. No fevers.         > FEN/GI:     Patient on clear liquid diet along with 1M IVFs due to decreased PO intake.

## 2021-10-25 ENCOUNTER — NURSE ONLY (OUTPATIENT)
Dept: FAMILY MEDICINE CLINIC | Age: 61
End: 2021-10-25
Payer: MEDICARE

## 2021-10-25 DIAGNOSIS — E53.8 VITAMIN B12 DEFICIENCY: Primary | ICD-10-CM

## 2021-10-25 PROCEDURE — 96372 THER/PROPH/DIAG INJ SC/IM: CPT | Performed by: FAMILY MEDICINE

## 2021-10-25 RX ORDER — CYANOCOBALAMIN 1000 UG/ML
1000 INJECTION INTRAMUSCULAR; SUBCUTANEOUS ONCE
Status: COMPLETED | OUTPATIENT
Start: 2021-10-25 | End: 2021-10-25

## 2021-10-25 RX ADMIN — CYANOCOBALAMIN 1000 MCG: 1000 INJECTION INTRAMUSCULAR; SUBCUTANEOUS at 14:21

## 2021-11-10 ENCOUNTER — VIRTUAL VISIT (OUTPATIENT)
Dept: FAMILY MEDICINE CLINIC | Age: 61
End: 2021-11-10
Payer: MEDICARE

## 2021-11-10 DIAGNOSIS — Z79.4 TYPE 2 DIABETES MELLITUS WITHOUT COMPLICATION, WITH LONG-TERM CURRENT USE OF INSULIN (HCC): ICD-10-CM

## 2021-11-10 DIAGNOSIS — I10 ESSENTIAL (PRIMARY) HYPERTENSION: ICD-10-CM

## 2021-11-10 DIAGNOSIS — E66.01 MORBID OBESITY DUE TO EXCESS CALORIES (HCC): ICD-10-CM

## 2021-11-10 DIAGNOSIS — M79.7 FIBROMYALGIA: ICD-10-CM

## 2021-11-10 DIAGNOSIS — J30.2 SEASONAL ALLERGIC RHINITIS, UNSPECIFIED TRIGGER: ICD-10-CM

## 2021-11-10 DIAGNOSIS — E11.9 TYPE 2 DIABETES MELLITUS WITHOUT COMPLICATION, WITH LONG-TERM CURRENT USE OF INSULIN (HCC): ICD-10-CM

## 2021-11-10 PROCEDURE — 99442 PR PHYS/QHP TELEPHONE EVALUATION 11-20 MIN: CPT | Performed by: FAMILY MEDICINE

## 2021-11-10 RX ORDER — FLUTICASONE PROPIONATE 50 MCG
SPRAY, SUSPENSION (ML) NASAL
Qty: 1 EACH | Refills: 5 | Status: SHIPPED | OUTPATIENT
Start: 2021-11-10 | End: 2022-01-10 | Stop reason: SDUPTHER

## 2021-11-10 RX ORDER — GLUCOSAMINE HCL/CHONDROITIN SU 500-400 MG
CAPSULE ORAL
Qty: 150 STRIP | Refills: 1 | Status: SHIPPED | OUTPATIENT
Start: 2021-11-10 | End: 2022-01-14 | Stop reason: SDUPTHER

## 2021-11-10 RX ORDER — TRAMADOL HYDROCHLORIDE 50 MG/1
100 TABLET ORAL EVERY 8 HOURS PRN
Qty: 180 TABLET | Refills: 0 | Status: SHIPPED | OUTPATIENT
Start: 2021-11-17 | End: 2021-12-13 | Stop reason: SDUPTHER

## 2021-11-10 RX ORDER — METOPROLOL TARTRATE 50 MG/1
50 TABLET, FILM COATED ORAL DAILY
Qty: 60 TABLET | Refills: 5 | Status: SHIPPED | OUTPATIENT
Start: 2021-11-10 | End: 2022-06-15 | Stop reason: SDUPTHER

## 2021-11-10 RX ORDER — LISINOPRIL AND HYDROCHLOROTHIAZIDE 20; 12.5 MG/1; MG/1
1 TABLET ORAL DAILY
Qty: 30 TABLET | Refills: 5 | Status: SHIPPED | OUTPATIENT
Start: 2021-11-10 | End: 2022-03-08 | Stop reason: SDUPTHER

## 2021-11-10 ASSESSMENT — PATIENT HEALTH QUESTIONNAIRE - PHQ9
2. FEELING DOWN, DEPRESSED OR HOPELESS: 0
SUM OF ALL RESPONSES TO PHQ QUESTIONS 1-9: 0
SUM OF ALL RESPONSES TO PHQ QUESTIONS 1-9: 0
SUM OF ALL RESPONSES TO PHQ9 QUESTIONS 1 & 2: 0
SUM OF ALL RESPONSES TO PHQ QUESTIONS 1-9: 0
1. LITTLE INTEREST OR PLEASURE IN DOING THINGS: 0

## 2021-11-10 NOTE — PROGRESS NOTES
Josephine Brizuela is a 64 y.o. female evaluated via telephone on 11/10/2021. Consent:  She and/or health care decision maker is aware that that she may receive a bill for this telephone service, depending on her insurance coverage, and has provided verbal consent to proceed: Yes      Documentation:  I communicated with the patient and/or health care decision maker about chronic issues. Details of this discussion including any medical advice provided:     Hypertension  This is a chronic problem. The current episode started more than 1 year ago. The problem has been gradually improving since onset. The problem is controlled. Pertinent negatives include no blurred vision, chest pain, headaches, malaise/fatigue, neck pain, orthopnea, palpitations, peripheral edema, PND, shortness of breath or sweats. Past treatments include diuretics and ACE inhibitors. The current treatment provides significant improvement. Diabetes  She presents for her follow-up diabetic visit. She has type 2 diabetes mellitus. Her disease course has been stable. There are no hypoglycemic associated symptoms. Pertinent negatives for hypoglycemia include no headaches or sweats. There are no diabetic associated symptoms. Pertinent negatives for diabetes include no blurred vision, no chest pain, no weakness and no weight loss. Symptoms are stable. Current diabetic treatment includes intensive insulin program. She is compliant with treatment most of the time. Her weight is fluctuating minimally. Her home blood glucose trend is fluctuating minimally. An ACE inhibitor/angiotensin II receptor blocker is being taken. Back Pain  This is a chronic problem. The current episode started more than 1 year ago. The problem occurs constantly. The problem has been gradually worsening since onset. The pain is present in the lumbar spine. The quality of the pain is described as shooting and stabbing. The pain is severe. Associated symptoms include leg pain. Pertinent negatives include no abdominal pain, bladder incontinence, bowel incontinence, chest pain, dysuria, fever, headaches, numbness, paresis, paresthesias, pelvic pain, perianal numbness, tingling, weakness or weight loss. She has tried analgesics for the symptoms. The treatment provided moderate relief. I affirm this is a Patient Initiated Episode with a Patient who has not had a related appointment within my department in the past 7 days or scheduled within the next 24 hours. Patient identification was verified at the start of the visit: Yes    Total Time: minutes: 11-20 minutes    The visit was conducted pursuant to the emergency declaration under the [address] [place] [address], [address] [place] [address] authority and the [employer] and "[employer] General Act. Patient identification was verified, and a caregiver was present when appropriate. The patient was located in a state where the provider was credentialed to provide care.     Note: not billable if this call serves to triage the patient into an appointment for the relevant concern      Yeni Perkins DO

## 2021-11-10 NOTE — PATIENT INSTRUCTIONS
Patient Education        Counting Carbohydrates for Diabetes: Care Instructions  Your Care Instructions     You don't have to eat special foods when you have diabetes. You just have to be careful to eat healthy foods. Carbohydrates (carbs) raise blood sugar higher and quicker than any other nutrient. Carbs are found in desserts, breads and cereals, and fruit. They're also in starchy vegetables. These include potatoes, corn, and grains such as rice and pasta. Carbs are also in milk and yogurt. The more carbs you eat at one time, the higher your blood sugar will rise. Spreading carbs all through the day helps keep your blood sugar levels within your target range. Counting carbs is one of the best ways to keep your blood sugar under control. If you use insulin, counting carbs helps you match the right amount of insulin to the number of grams of carbs in a meal. Then you can change your diet and insulin dose as needed. Testing your blood sugar several times a day can help you learn how carbs affect your blood sugar. A registered dietitian or certified diabetes educator can help you plan meals and snacks. Follow-up care is a key part of your treatment and safety. Be sure to make and go to all appointments, and call your doctor if you are having problems. It's also a good idea to know your test results and keep a list of the medicines you take. How can you care for yourself at home? Know your daily amount of carbohydrates  Your daily amount depends on several things, such as your weight, how active you are, which diabetes medicines you take, and what your goals are for your blood sugar levels. A registered dietitian or certified diabetes educator can help you plan how many carbs to include in each meal and snack. For most adults, a guideline for the daily amount of carbs is:  · 45 to 60 grams at each meal. That's about the same as 3 to 4 carbohydrate servings. · 15 to 20 grams at each snack.  That's about the same as 1 carbohydrate serving. Count carbs  Counting carbs lets you know how much rapid-acting insulin to take before you eat. If you use an insulin pump, you get a constant rate of insulin during the day. So the pump must be programmed at meals. This gives you extra insulin to cover the rise in blood sugar after meals. If you take insulin:  · Learn your own insulin-to-carb ratio. You and your diabetes health professional will figure out the ratio. You can do this by testing your blood sugar after meals. For example, you may need a certain amount of insulin for every 15 grams of carbs. · Add up the carb grams in a meal. Then you can figure out how many units of insulin to take based on your insulin-to-carb ratio. · Exercise lowers blood sugar. You can use less insulin than you would if you were not doing exercise. Keep in mind that timing matters. If you exercise within 1 hour after a meal, your body may need less insulin for that meal than it would if you exercised 3 hours after the meal. Test your blood sugar to find out how exercise affects your need for insulin. If you do or don't take insulin:  · Look at labels on packaged foods. This can tell you how many carbs are in a serving. You can also use guides from the American Diabetes Association. · Be aware of portions, or serving sizes. If a package has two servings and you eat the whole package, you need to double the number of grams of carbohydrate listed for one serving. · Protein, fat, and fiber do not raise blood sugar as much as carbs do. If you eat a lot of these nutrients in a meal, your blood sugar will rise more slowly than it would otherwise. Eat from all food groups  · Eat at least three meals a day. · Plan meals to include food from all the food groups. The food groups include grains, fruits, dairy, proteins, and vegetables. · Talk to your dietitian or diabetes educator about ways to add limited amounts of sweets into your meal plan.   · If you drink alcohol, talk to your doctor. It may not be recommended when you are taking certain diabetes medicines. Where can you learn more? Go to [email or web address] and sign in to your [employer] account. Enter O096 in the [employer] box to learn more about \"Counting Carbohydrates for Diabetes: Care Instructions. \"     If you do not have an account, please click on the \"Sign Up Now\" link. Current as of: August 31, 2020               Content Version: 13.0  © 8133-6748 Healthwise, Incorporated. Care instructions adapted under license by [facility] ([facility] [place]). If you have questions about a medical condition or this instruction, always ask your healthcare professional. [address] any warranty or liability for your use of this information.

## 2021-11-15 ENCOUNTER — NURSE ONLY (OUTPATIENT)
Dept: FAMILY MEDICINE CLINIC | Age: 61
End: 2021-11-15
Payer: MEDICARE

## 2021-11-15 DIAGNOSIS — E53.8 VITAMIN B12 DEFICIENCY: Primary | ICD-10-CM

## 2021-11-15 PROCEDURE — 96372 THER/PROPH/DIAG INJ SC/IM: CPT | Performed by: FAMILY MEDICINE

## 2021-11-15 RX ORDER — CYANOCOBALAMIN 1000 UG/ML
1000 INJECTION INTRAMUSCULAR; SUBCUTANEOUS ONCE
Status: COMPLETED | OUTPATIENT
Start: 2021-11-15 | End: 2021-11-15

## 2021-11-15 RX ADMIN — CYANOCOBALAMIN 1000 MCG: 1000 INJECTION INTRAMUSCULAR; SUBCUTANEOUS at 13:40

## 2021-11-15 NOTE — PROGRESS NOTES
Patient came in today to get her B12 injection and gave it to her left deltoid. Order ok by Dr. Sam Alvarenga. Patient took the injection well and told her if she needed anything to let me know.

## 2021-12-01 ENCOUNTER — TELEPHONE (OUTPATIENT)
Dept: FAMILY MEDICINE CLINIC | Age: 61
End: 2021-12-01

## 2021-12-01 NOTE — TELEPHONE ENCOUNTER
Pt has some kind of sores that come and go and is now \"attacking her feet\"  She says she has had this before, and both Dr Kinjal Fishman and Dr Mariola Giraldo sent in antibiotics for this problem and she wants to know if you will send the antibiotic for her?

## 2021-12-02 NOTE — TELEPHONE ENCOUNTER
We need to evaluate the areas on her feet in office - can see Dr. Deon Prader for an acute visit if I don't have any open spots this week or next week

## 2021-12-06 ENCOUNTER — TELEPHONE (OUTPATIENT)
Dept: FAMILY MEDICINE CLINIC | Age: 61
End: 2021-12-06

## 2021-12-06 NOTE — TELEPHONE ENCOUNTER
----- Message from [place] sent at 12/6/2021  2:44 PM EST -----  Subject: Appointment Request    Reason for Call: Routine Existing Condition Follow Up    QUESTIONS  Type of Appointment? Established Patient  Reason for appointment request? Available appointments did not meet   patient need  Additional Information for Provider? Patient Fabio Gitelman is calling to   schedule a follow up appointment for right leg and foot with Dr. David Knight in   the month of December. Ms. Kelly Almeida would like a call back regarding this   message as soon as possible.   ---------------------------------------------------------------------------  --------------  CALL BACK INFO  What is the best way for the office to contact you? OK to leave message on   voicemail  Preferred Call Back Phone Number? [phone]  ---------------------------------------------------------------------------  --------------  SCRIPT ANSWERS  Relationship to Patient? Self  Is this follow up request related to routine Diabetes Management? No  Have you been diagnosed with, awaiting test results for, or told that you   are suspected of having COVID-19 (Coronavirus)? (If patient has tested   negative or was tested as a requirement for work, school, or travel and   not based on symptoms, answer no)? No  Within the past two weeks have you developed any of the following symptoms   (answer no if symptoms have been present longer than 2 weeks or began   more than 2 weeks ago)? Fever or Chills, Cough, Shortness of breath or   difficulty breathing, Loss of taste or smell, Sore throat, Nasal   congestion, Sneezing or runny nose, Fatigue or generalized body aches   (answer no if pain is specific to a body part e.g. back pain), Diarrhea,   Headache? No  Have you had close contact with someone with COVID-19 in the last 14 days? No  (Service Expert  click yes below to proceed with [facility] As Usual   Scheduling)?  Yes

## 2021-12-08 NOTE — TELEPHONE ENCOUNTER
Please see if she do 8:20am on the 21st - put her in a provider fill spot and have her come at 8:20am

## 2021-12-10 DIAGNOSIS — K21.9 GASTROESOPHAGEAL REFLUX DISEASE, UNSPECIFIED WHETHER ESOPHAGITIS PRESENT: ICD-10-CM

## 2021-12-10 DIAGNOSIS — M79.7 FIBROMYALGIA: ICD-10-CM

## 2021-12-10 NOTE — TELEPHONE ENCOUNTER
Pt will be out of Tramadol on Monday and has to work all day      Marlen Shah is calling to request a refill on the following medication(s):    Medication Request:  Requested Prescriptions     Pending Prescriptions Disp Refills    omeprazole (PRILOSEC) 40 MG delayed release capsule 30 capsule 5     Sig: Take 1 capsule by mouth every morning (before breakfast)       Last Visit Date (If Applicable):  11/25/6883    Next Visit Date:    12/21/2021

## 2021-12-13 RX ORDER — TRAMADOL HYDROCHLORIDE 50 MG/1
100 TABLET ORAL EVERY 8 HOURS PRN
Qty: 180 TABLET | Refills: 0 | Status: SHIPPED | OUTPATIENT
Start: 2021-12-15 | End: 2022-01-10 | Stop reason: SDUPTHER

## 2021-12-13 RX ORDER — OMEPRAZOLE 40 MG/1
40 CAPSULE, DELAYED RELEASE ORAL
Qty: 30 CAPSULE | Refills: 5 | Status: SHIPPED | OUTPATIENT
Start: 2021-12-13 | End: 2022-01-10 | Stop reason: SDUPTHER

## 2021-12-21 ENCOUNTER — OFFICE VISIT (OUTPATIENT)
Dept: FAMILY MEDICINE CLINIC | Age: 61
End: 2021-12-21
Payer: MEDICARE

## 2021-12-21 VITALS
HEART RATE: 83 BPM | BODY MASS INDEX: 39.31 KG/M2 | OXYGEN SATURATION: 98 % | HEIGHT: 68 IN | WEIGHT: 259.4 LBS | SYSTOLIC BLOOD PRESSURE: 140 MMHG | DIASTOLIC BLOOD PRESSURE: 80 MMHG

## 2021-12-21 DIAGNOSIS — B35.3 TINEA PEDIS OF RIGHT FOOT: ICD-10-CM

## 2021-12-21 DIAGNOSIS — E53.8 VITAMIN B12 DEFICIENCY: ICD-10-CM

## 2021-12-21 DIAGNOSIS — L03.115 CELLULITIS OF RIGHT LOWER EXTREMITY: Primary | ICD-10-CM

## 2021-12-21 DIAGNOSIS — I10 ESSENTIAL (PRIMARY) HYPERTENSION: ICD-10-CM

## 2021-12-21 PROCEDURE — 3017F COLORECTAL CA SCREEN DOC REV: CPT | Performed by: FAMILY MEDICINE

## 2021-12-21 PROCEDURE — 4004F PT TOBACCO SCREEN RCVD TLK: CPT | Performed by: FAMILY MEDICINE

## 2021-12-21 PROCEDURE — 99214 OFFICE O/P EST MOD 30 MIN: CPT | Performed by: FAMILY MEDICINE

## 2021-12-21 PROCEDURE — G8427 DOCREV CUR MEDS BY ELIG CLIN: HCPCS | Performed by: FAMILY MEDICINE

## 2021-12-21 PROCEDURE — 96372 THER/PROPH/DIAG INJ SC/IM: CPT | Performed by: FAMILY MEDICINE

## 2021-12-21 PROCEDURE — G8417 CALC BMI ABV UP PARAM F/U: HCPCS | Performed by: FAMILY MEDICINE

## 2021-12-21 PROCEDURE — G8484 FLU IMMUNIZE NO ADMIN: HCPCS | Performed by: FAMILY MEDICINE

## 2021-12-21 RX ORDER — CEPHALEXIN 500 MG/1
500 CAPSULE ORAL 4 TIMES DAILY
Qty: 40 CAPSULE | Refills: 0 | Status: SHIPPED | OUTPATIENT
Start: 2021-12-21 | End: 2022-01-14 | Stop reason: SDUPTHER

## 2021-12-21 RX ORDER — TERBINAFINE HYDROCHLORIDE 250 MG/1
250 TABLET ORAL DAILY
Qty: 14 TABLET | Refills: 0 | Status: SHIPPED | OUTPATIENT
Start: 2021-12-21 | End: 2022-01-04

## 2021-12-21 RX ORDER — HYDROXYZINE HYDROCHLORIDE 25 MG/1
25 TABLET, FILM COATED ORAL EVERY 8 HOURS PRN
Qty: 30 TABLET | Refills: 0 | Status: SHIPPED | OUTPATIENT
Start: 2021-12-21 | End: 2022-01-14 | Stop reason: SDUPTHER

## 2021-12-21 RX ORDER — CYANOCOBALAMIN 1000 UG/ML
1000 INJECTION INTRAMUSCULAR; SUBCUTANEOUS ONCE
Status: COMPLETED | OUTPATIENT
Start: 2021-12-21 | End: 2021-12-21

## 2021-12-21 RX ADMIN — CYANOCOBALAMIN 1000 MCG: 1000 INJECTION INTRAMUSCULAR; SUBCUTANEOUS at 09:34

## 2021-12-21 ASSESSMENT — ENCOUNTER SYMPTOMS
RESPIRATORY NEGATIVE: 1
EYES NEGATIVE: 1
ALLERGIC/IMMUNOLOGIC NEGATIVE: 1
GASTROINTESTINAL NEGATIVE: 1

## 2021-12-21 ASSESSMENT — PATIENT HEALTH QUESTIONNAIRE - PHQ9
SUM OF ALL RESPONSES TO PHQ QUESTIONS 1-9: 0
SUM OF ALL RESPONSES TO PHQ9 QUESTIONS 1 & 2: 0
2. FEELING DOWN, DEPRESSED OR HOPELESS: 0
1. LITTLE INTEREST OR PLEASURE IN DOING THINGS: 0

## 2021-12-21 NOTE — PROGRESS NOTES
[facility] Progress Note    Date:12/21/2021         Patient Mike Lopez     YOB: 1960     Age:61 y.o. Assessment/Plan        Problem List Items Addressed This Visit        Circulatory    Essential (primary) hypertension      Borderline controlled, continue current medications, continue current treatment plan, medication adherence emphasized and lifestyle modifications recommended            Other    Vitamin B12 deficiency      Given injection today         Relevant Medications    cyanocobalamin injection 1,000 mcg (Start on 12/21/2021  9:15 AM)      Other Visit Diagnoses     Cellulitis of right lower extremity    -  Primary    Start keflex  Topical antibiotic  Reviewed S/S of worsening infection    Relevant Medications    cephALEXin (KEFLEX) 500 MG capsule    mupirocin (BACTROBAN) 2 % ointment    hydrOXYzine (ATARAX) 25 MG tablet    Tinea pedis of right foot        Topical ineffective and unable to properly treat  Terb x2 weeks    Relevant Medications    terbinafine (LAMISIL) 250 MG tablet           Return if symptoms worsen or fail to improve. Electronically signed by Andrew Berrios DO on 12/21/21         Subjective     Toshia Lepe is a 64 y.o. female presenting today for   Chief Complaint   Patient presents with    Leg Pain     right    Foot Pain     right   . Tinea  Patient complains of probable tinea. Lesion is located on the right heel. Symptoms include rash located right heel  patches of scaly skin located on the right heel. Symptoms have been ongoing for about several months. Previous evaluation and treatment has included none with poor improvement. Toshia Lepe is a 64 y.o. female who presents for evaluation of a possible skin infection located right lower leg. Symptoms include moderate pain and erythema. A lot of itching. Patient denies chills and fever greater than 100. Precipitating event: abrasion.  Treatment to date has included none with no relief. Hypertension  This is a chronic problem. The current episode started more than 1 year ago. The problem has been gradually improving since onset. The problem is controlled. Pertinent negatives include no peripheral edema. Past treatments include diuretics and ACE inhibitors. The current treatment provides significant improvement. Review of Systems   Review of Systems   Constitutional: Negative. HENT: Negative. Eyes: Negative. Respiratory: Negative. Cardiovascular: Negative. Gastrointestinal: Negative. Endocrine: Negative. Genitourinary: Negative. Skin: Negative. Allergic/Immunologic: Negative. Neurological: Negative. Hematological: Negative. Psychiatric/Behavioral: Negative. All other systems reviewed and are negative. Medications     Current Outpatient Medications   Medication Sig Dispense Refill    cephALEXin (KEFLEX) 500 MG capsule Take 1 capsule by mouth 4 times daily for 10 days 40 capsule 0    terbinafine (LAMISIL) 250 MG tablet Take 1 tablet by mouth daily for 14 days 14 tablet 0    mupirocin (BACTROBAN) 2 % ointment AFFECTED AREA(S) THREE TIMES A DAY 15 g 2    hydrOXYzine (ATARAX) 25 MG tablet Take 1 tablet by mouth every 8 hours as needed for Itching 30 tablet 0    omeprazole (PRILOSEC) 40 MG delayed release capsule Take 1 capsule by mouth every morning (before breakfast) 30 capsule 5    traMADol (ULTRAM) 50 MG tablet Take 2 tablets by mouth every 8 hours as needed for Pain for up to 30 days. 180 tablet 0    blood glucose monitor strips Test 4  times a day as needed for symptoms of irregular blood glucose.  Pt Has One Touch Ultra 150 strip 1    fluticasone (FLONASE) 50 MCG/ACT nasal spray SPRAY TWO SPRAYS IN EACH NOSTRIL ONCE DAILY 1 each 5    lisinopril-hydroCHLOROthiazide (PRINZIDE;ZESTORETIC) 20-12.5 MG per tablet Take 1 tablet by mouth daily 30 tablet 5    metoprolol tartrate (LOPRESSOR) 50 MG tablet Take 1 tablet by mouth daily 60 tablet 5    insulin glargine (LANTUS SOLOSTAR) 100 UNIT/ML injection pen Inject 20 Units into the skin 2 times daily 5 pen 2    ULTICARE MICRO PEN NEEDLES 32G X 4 MM MISC Inject 100 each into the skin 2 times daily 100 each 3    Lancets MISC Pt testing twice daily 100 each 5    fluocinonide (LIDEX) 0.05 % cream Apply topically 2 times daily. 120 g 2    aspirin-acetaminophen-caffeine (EXCEDRIN MIGRAINE) 167-045-61 MG per tablet Take 2 tablets by mouth 2 times daily       Current Facility-Administered Medications   Medication Dose Route Frequency Provider Last Rate Last Admin    cyanocobalamin injection 1,000 mcg  1,000 mcg IntraMUSCular Once Marden Solorzano,            Past History    Past Medical History:   has no past medical history on file. Social History:   reports that she has been smoking cigarettes. She has a 15.00 pack-year smoking history. She has never used smokeless tobacco. She reports that she does not drink alcohol and does not use drugs. Family History: No family history on file. Surgical History:   Past Surgical History:   Procedure Laterality Date    COLONOSCOPY  06/2016    poor prep, there appears to be some polyps in the colon mixed with stool, could not be removed        Physical Examination      Vitals:  BP (!) 140/80 (Site: Left Upper Arm, Position: Sitting, Cuff Size: Medium Adult)   Pulse 83   Ht 5' 7.5\" (1.715 m)   Wt 259 lb 6.4 oz (117.7 kg)   SpO2 98%   BMI 40.03 kg/m²     Physical Exam  Vitals and nursing note reviewed. Constitutional:       General: She is not in acute distress. Appearance: Normal appearance. She is obese. She is not ill-appearing, toxic-appearing or diaphoretic. HENT:      Head: Normocephalic and atraumatic. Eyes:      General: No scleral icterus. Right eye: No discharge. Left eye: No discharge. Extraocular Movements: Extraocular movements intact.       Conjunctiva/sclera: Conjunctivae normal.   Cardiovascular: foraminal narrowing. Mild facet joint DJD. L3-L4: Mild DDD with disc height loss and desiccation. Bilateral facet joint  DJD and ligamentum flavum thickening. Mild spinal canal stenosis. No  significant neural foraminal stenosis. L4-L5: Mild DDD. Posterior annular fissure. Diffuse disc bulge measures 3  mm. Bilateral facet joint DJD and ligamentum flavum thickening. Mild spinal  canal stenosis. No significant neural foraminal stenosis. L5-S1: There is no significant disc herniation, spinal canal stenosis or  neural foraminal narrowing. Bilateral facet joint DJD. Impression: Mild scattered degenerate spondylosis throughout the lumbar spine without  high-grade lumbar spinal canal or high-grade neural foraminal stenosis.

## 2021-12-21 NOTE — PATIENT INSTRUCTIONS
Patient Education        Cellulitis: Care Instructions  Your Care Instructions     Cellulitis is a skin infection caused by bacteria, most often strep or staph. It often occurs after a break in the skin from a scrape, cut, bite, or puncture, or after a rash. Cellulitis may be treated without doing tests to find out what caused it. But your doctor may do tests, if needed, to look for a specific bacteria, like methicillin-resistant Staphylococcus aureus (MRSA). The doctor has checked you carefully, but problems can develop later. If you notice any problems or new symptoms, get medical treatment right away. Follow-up care is a key part of your treatment and safety. Be sure to make and go to all appointments, and call your doctor if you are having problems. It's also a good idea to know your test results and keep a list of the medicines you take. How can you care for yourself at home? · Take your antibiotics as directed. Do not stop taking them just because you feel better. You need to take the full course of antibiotics. · Prop up the infected area on pillows to reduce pain and swelling. Try to keep the area above the level of your heart as often as you can. · If your doctor told you how to care for your wound, follow your doctor's instructions. If you did not get instructions, follow this general advice:  ? Wash the wound with clean water 2 times a day. Don't use hydrogen peroxide or alcohol, which can slow healing. ? You may cover the wound with a thin layer of petroleum jelly, such as Vaseline, and a nonstick bandage. ? Apply more petroleum jelly and replace the bandage as needed. · Be safe with medicines. Take pain medicines exactly as directed. ? If the doctor gave you a prescription medicine for pain, take it as prescribed. ? If you are not taking a prescription pain medicine, ask your doctor if you can take an over-the-counter medicine.   To prevent cellulitis in the future  · Try to prevent cuts, scrapes, or other injuries to your skin. Cellulitis most often occurs where there is a break in the skin. · If you get a scrape, cut, mild burn, or bite, wash the wound with clean water as soon as you can to help avoid infection. Don't use hydrogen peroxide or alcohol, which can slow healing. · If you have swelling in your legs (edema), support stockings and good skin care may help prevent leg sores and cellulitis. · Take care of your feet, especially if you have diabetes or other conditions that increase the risk of infection. Wear shoes and socks. Do not go barefoot. If you have athlete's foot or other skin problems on your feet, talk to your doctor about how to treat them. When should you call for help? Call your doctor now or seek immediate medical care if:    · You have signs that your infection is getting worse, such as:  ? Increased pain, swelling, warmth, or redness. ? Red streaks leading from the area. ? Pus draining from the area. ? A fever.     · You get a rash. Watch closely for changes in your health, and be sure to contact your doctor if:    · You do not get better as expected. Where can you learn more? Go to [email or web address].[email or web address] org and sign in to your [employer] account. Enter W299 in the [facility] box to learn more about \"Cellulitis: Care Instructions. \"     If you do not have an account, please click on the \"Sign Up Now\" link. Current as of: March 3, 2021               Content Version: 13.0  © 2006-2021 Healthwise, Incorporated. Care instructions adapted under license by [facility] ([facility]). If you have questions about a medical condition or this instruction, always ask your healthcare professional. [place] [address] any warranty or liability for your use of this information.

## 2022-01-06 ENCOUNTER — TELEPHONE (OUTPATIENT)
Dept: FAMILY MEDICINE CLINIC | Age: 62
End: 2022-01-06

## 2022-01-06 DIAGNOSIS — R60.0 BILATERAL LOWER EXTREMITY EDEMA: Primary | ICD-10-CM

## 2022-01-06 RX ORDER — FUROSEMIDE 20 MG/1
20 TABLET ORAL DAILY
Qty: 30 TABLET | Refills: 0 | Status: SHIPPED | OUTPATIENT
Start: 2022-01-06

## 2022-01-06 NOTE — TELEPHONE ENCOUNTER
Patient wants to know if you will call in [place] for her. Bilateral foot swelling - they are so tight she can hardly walk. She is a  and is having trouble at work due to being on her feet. She averages 41 hours a week of work. Patient very upset and states she needs help.

## 2022-01-06 NOTE — TELEPHONE ENCOUNTER
I also ordered a metabolic panel to check on her electrolytes and kidneys since she will be on a diuretic

## 2022-01-10 DIAGNOSIS — K21.9 GASTROESOPHAGEAL REFLUX DISEASE, UNSPECIFIED WHETHER ESOPHAGITIS PRESENT: ICD-10-CM

## 2022-01-10 DIAGNOSIS — J30.2 SEASONAL ALLERGIC RHINITIS, UNSPECIFIED TRIGGER: ICD-10-CM

## 2022-01-10 DIAGNOSIS — M79.7 FIBROMYALGIA: ICD-10-CM

## 2022-01-10 RX ORDER — FLUTICASONE PROPIONATE 50 MCG
SPRAY, SUSPENSION (ML) NASAL
Qty: 1 EACH | Refills: 5 | Status: SHIPPED | OUTPATIENT
Start: 2022-01-10 | End: 2022-02-08 | Stop reason: SDUPTHER

## 2022-01-10 RX ORDER — TRAMADOL HYDROCHLORIDE 50 MG/1
100 TABLET ORAL EVERY 8 HOURS PRN
Qty: 180 TABLET | Refills: 0 | Status: SHIPPED | OUTPATIENT
Start: 2022-01-12 | End: 2022-02-08 | Stop reason: SDUPTHER

## 2022-01-10 RX ORDER — OMEPRAZOLE 40 MG/1
40 CAPSULE, DELAYED RELEASE ORAL
Qty: 30 CAPSULE | Refills: 5 | Status: SHIPPED | OUTPATIENT
Start: 2022-01-10 | End: 2022-03-08 | Stop reason: SDUPTHER

## 2022-01-10 NOTE — TELEPHONE ENCOUNTER
Prakash Kingston is calling to request a refill on the following medication(s):    Medication Request:  Requested Prescriptions     Pending Prescriptions Disp Refills    omeprazole (PRILOSEC) 40 MG delayed release capsule 30 capsule 5     Sig: Take 1 capsule by mouth every morning (before breakfast)       Last Visit Date (If Applicable):  72/27/6925  In office    Next Visit Date:    1/14/2022

## 2022-01-14 ENCOUNTER — OFFICE VISIT (OUTPATIENT)
Dept: FAMILY MEDICINE CLINIC | Age: 62
End: 2022-01-14
Payer: MEDICARE

## 2022-01-14 VITALS
DIASTOLIC BLOOD PRESSURE: 88 MMHG | OXYGEN SATURATION: 99 % | SYSTOLIC BLOOD PRESSURE: 132 MMHG | TEMPERATURE: 97.1 F | HEART RATE: 86 BPM | BODY MASS INDEX: 39.5 KG/M2 | WEIGHT: 256 LBS

## 2022-01-14 DIAGNOSIS — L03.115 CELLULITIS OF RIGHT LOWER EXTREMITY: ICD-10-CM

## 2022-01-14 DIAGNOSIS — M48.062 LUMBAR STENOSIS WITH NEUROGENIC CLAUDICATION: ICD-10-CM

## 2022-01-14 DIAGNOSIS — R60.0 BILATERAL LOWER EXTREMITY EDEMA: Primary | ICD-10-CM

## 2022-01-14 DIAGNOSIS — E11.9 TYPE 2 DIABETES MELLITUS WITHOUT COMPLICATION, WITH LONG-TERM CURRENT USE OF INSULIN (HCC): ICD-10-CM

## 2022-01-14 DIAGNOSIS — Z79.4 TYPE 2 DIABETES MELLITUS WITHOUT COMPLICATION, WITH LONG-TERM CURRENT USE OF INSULIN (HCC): ICD-10-CM

## 2022-01-14 DIAGNOSIS — E53.8 VITAMIN B12 DEFICIENCY: ICD-10-CM

## 2022-01-14 DIAGNOSIS — I10 ESSENTIAL (PRIMARY) HYPERTENSION: ICD-10-CM

## 2022-01-14 DIAGNOSIS — K21.9 GASTROESOPHAGEAL REFLUX DISEASE, UNSPECIFIED WHETHER ESOPHAGITIS PRESENT: ICD-10-CM

## 2022-01-14 PROCEDURE — 2022F DILAT RTA XM EVC RTNOPTHY: CPT | Performed by: FAMILY MEDICINE

## 2022-01-14 PROCEDURE — 3017F COLORECTAL CA SCREEN DOC REV: CPT | Performed by: FAMILY MEDICINE

## 2022-01-14 PROCEDURE — G8484 FLU IMMUNIZE NO ADMIN: HCPCS | Performed by: FAMILY MEDICINE

## 2022-01-14 PROCEDURE — 99214 OFFICE O/P EST MOD 30 MIN: CPT | Performed by: FAMILY MEDICINE

## 2022-01-14 PROCEDURE — 4004F PT TOBACCO SCREEN RCVD TLK: CPT | Performed by: FAMILY MEDICINE

## 2022-01-14 PROCEDURE — G8417 CALC BMI ABV UP PARAM F/U: HCPCS | Performed by: FAMILY MEDICINE

## 2022-01-14 PROCEDURE — G8427 DOCREV CUR MEDS BY ELIG CLIN: HCPCS | Performed by: FAMILY MEDICINE

## 2022-01-14 PROCEDURE — 3046F HEMOGLOBIN A1C LEVEL >9.0%: CPT | Performed by: FAMILY MEDICINE

## 2022-01-14 RX ORDER — CEPHALEXIN 500 MG/1
500 CAPSULE ORAL 4 TIMES DAILY
Qty: 40 CAPSULE | Refills: 0 | Status: SHIPPED | OUTPATIENT
Start: 2022-01-14 | End: 2022-01-24

## 2022-01-14 RX ORDER — CYANOCOBALAMIN 1000 UG/ML
1000 INJECTION INTRAMUSCULAR; SUBCUTANEOUS ONCE
Status: COMPLETED | OUTPATIENT
Start: 2022-01-14 | End: 2022-01-14

## 2022-01-14 RX ORDER — INSULIN GLARGINE 100 [IU]/ML
20 INJECTION, SOLUTION SUBCUTANEOUS 2 TIMES DAILY
Qty: 5 PEN | Refills: 2 | Status: SHIPPED | OUTPATIENT
Start: 2022-01-14 | End: 2022-05-11 | Stop reason: SDUPTHER

## 2022-01-14 RX ORDER — GLUCOSAMINE HCL/CHONDROITIN SU 500-400 MG
CAPSULE ORAL
Qty: 150 STRIP | Refills: 1 | Status: SHIPPED | OUTPATIENT
Start: 2022-01-14 | End: 2022-04-29

## 2022-01-14 RX ORDER — HYDROXYZINE HYDROCHLORIDE 25 MG/1
25 TABLET, FILM COATED ORAL EVERY 8 HOURS PRN
Qty: 30 TABLET | Refills: 0 | Status: SHIPPED | OUTPATIENT
Start: 2022-01-14 | End: 2022-01-24

## 2022-01-14 RX ADMIN — CYANOCOBALAMIN 1000 MCG: 1000 INJECTION INTRAMUSCULAR; SUBCUTANEOUS at 14:12

## 2022-01-14 ASSESSMENT — ENCOUNTER SYMPTOMS
HOARSE VOICE: 0
HEARTBURN: 1
COUGH: 0
NAUSEA: 0
RESPIRATORY NEGATIVE: 1
BOWEL INCONTINENCE: 0
WATER BRASH: 0
ORTHOPNEA: 0
EYES NEGATIVE: 1
SORE THROAT: 0
BACK PAIN: 1
ABDOMINAL PAIN: 0
ALLERGIC/IMMUNOLOGIC NEGATIVE: 1
BLURRED VISION: 0
STRIDOR: 0
CHOKING: 0
WHEEZING: 0
BELCHING: 0
GLOBUS SENSATION: 0
SHORTNESS OF BREATH: 0

## 2022-01-14 ASSESSMENT — PATIENT HEALTH QUESTIONNAIRE - PHQ9
SUM OF ALL RESPONSES TO PHQ QUESTIONS 1-9: 1
2. FEELING DOWN, DEPRESSED OR HOPELESS: 1
SUM OF ALL RESPONSES TO PHQ9 QUESTIONS 1 & 2: 1
SUM OF ALL RESPONSES TO PHQ QUESTIONS 1-9: 1
1. LITTLE INTEREST OR PLEASURE IN DOING THINGS: 0

## 2022-01-14 NOTE — PATIENT INSTRUCTIONS
Patient Education        Preventing Falls: Care Instructions  Your Care Instructions     Getting around your home safely can be a challenge if you have injuries or health problems that make it easy for you to fall. Loose rugs and furniture in walkways are among the dangers for many older people who have problems walking or who have poor eyesight. People who have conditions such as arthritis, osteoporosis, or dementia also have to be careful not to fall. You can make your home safer with a few simple measures. Follow-up care is a key part of your treatment and safety. Be sure to make and go to all appointments, and call your doctor if you are having problems. It's also a good idea to know your test results and keep a list of the medicines you take. How can you care for yourself at home? Taking care of yourself  · You may get dizzy if you do not drink enough water. To prevent dehydration, drink plenty of fluids. Choose water and other clear liquids. If you have kidney, heart, or liver disease and have to limit fluids, talk with your doctor before you increase the amount of fluids you drink. · Exercise regularly to improve your strength, muscle tone, and balance. Walk if you can. Swimming may be a good choice if you cannot walk easily. · Have your vision and hearing checked each year or any time you notice a change. If you have trouble seeing and hearing, you might not be able to avoid objects and could lose your balance. · Know the side effects of the medicines you take. Ask your doctor or pharmacist whether the medicines you take can affect your balance. Sleeping pills or sedatives can affect your balance. · Limit the amount of alcohol you drink. Alcohol can impair your balance and other senses. · Ask your doctor whether calluses or corns on your feet need to be removed. If you wear loose-fitting shoes because of calluses or corns, you can lose your balance and fall.   · Talk to your doctor if you have numbness in your feet. Preventing falls at home  · Remove raised doorway thresholds, throw rugs, and clutter. Repair loose carpet or raised areas in the floor. · Move furniture and electrical cords to keep them out of walking paths. · Use nonskid floor wax, and wipe up spills right away, especially on ceramic tile floors. · If you use a walker or cane, put rubber tips on it. If you use crutches, clean the bottoms of them regularly with an abrasive pad, such as steel wool. · Keep your house well lit, especially [place], and outside walkways. Use night-lights in areas such as hallways and bathrooms. Add extra light switches or use remote switches (such as switches that go on or off when you clap your hands) to make it easier to turn lights on if you have to get up during the night. · Install sturdy handrails on stairways. · Move items in your cabinets so that the things you use a lot are on the lower shelves (about waist level). · Keep a cordless phone and a flashlight with new batteries by your bed. If possible, put a phone in each of the main rooms of your house, or carry a cell phone in case you fall and cannot reach a phone. Or, you can wear a device around your neck or wrist. You push a button that sends a signal for help. · Wear low-heeled shoes that fit well and give your feet good support. Use footwear with nonskid soles. Check the heels and soles of your shoes for wear. Repair or replace worn heels or soles. · Do not wear socks without shoes on wood floors. · Walk on the grass when the sidewalks are slippery. If you live in an area that gets snow and ice in the winter, sprinkle salt on slippery steps and sidewalks. Preventing falls in the bath  · Install grab bars and nonskid mats inside and outside your shower or tub and near the toilet and sinks. · Use shower chairs and bath benches. · Use a hand-held shower head that will allow you to sit while showering.   · Get into a tub or shower by putting the weaker leg in first. Get out of a tub or shower with your strong side first.  · Repair loose toilet seats and consider installing a raised toilet seat to make getting on and off the toilet easier. · Keep your bathroom door unlocked while you are in the shower. Where can you learn more? Go to https://Guest [email or web address] and sign in to your [employer] account. Enter [phone] in the [facility] box to learn more about \"Preventing Falls: Care Instructions. \"     If you do not have an account, please click on the \"Sign Up Now\" link. Current as of: September 8, 2021               Content Version: 13.1  © 4920-7122 [employer]. Care instructions adapted under license by [facility] ([facility]). If you have questions about a medical condition or this instruction, always ask your healthcare professional. [place] [address] any warranty or liability for your use of this information.

## 2022-01-14 NOTE — ASSESSMENT & PLAN NOTE
Uncontrolled, continue current medications, continue current treatment plan, medication adherence emphasized and lifestyle modifications recommended

## 2022-01-14 NOTE — PROGRESS NOTES
[facility] Progress Note    Date:1/14/2022         Patient Rafael Lund     YOB: 1960     Age:61 y.o. Assessment/Plan        Problem List Items Addressed This Visit        Circulatory    Essential (primary) hypertension      Well-controlled, continue current medications, continue current treatment plan, medication adherence emphasized and lifestyle modifications recommended            Digestive    Gastroesophageal reflux disease      Well-controlled, continue current medications and continue current treatment plan            Endocrine    Type 2 diabetes mellitus without complication, with long-term current use of insulin (HCC)      Uncontrolled, continue current medications, continue current treatment plan, medication adherence emphasized and lifestyle modifications recommended         Relevant Medications    blood glucose monitor strips    insulin glargine (LANTUS SOLOSTAR) 100 UNIT/ML injection pen       Other    Lumbar stenosis with neurogenic claudication      Well-controlled, continue current medications and continue current treatment plan         Vitamin B12 deficiency      Other Visit Diagnoses     Bilateral lower extremity edema    -  Primary    Resolved  Most likely 2/2 to increased salt intake - stop    Relevant Medications    hydrOXYzine (ATARAX) 25 MG tablet    Cellulitis of right lower extremity        Start keflex  Topical antibiotic  Reviewed S/S of worsening infection    Relevant Medications    cephALEXin (KEFLEX) 500 MG capsule           Return in about 3 months (around 4/14/2022). Electronically signed by Vince Kaur DO on 1/14/22       Total time spent was between Time personally spent assessing and managing the patient on the date of service: Est: 30-39 minutes ([id number]) mins.  This included time spent reviewing the patient's medical record (e.g., recent visits, labs, and studies); seeing the patient in the office (face-to-face time); ordering medications, studies, procedures, or referrals; calling the patient or family later in the day with results and further recommendations; and documenting the visit in the medical record. Subjective     Jesse Catalan is a 64 y.o. female presenting today for   Chief Complaint   Patient presents with    Swelling     foot swelling follow up     Other     wants b12 injection    . Jesse Catalan is a 64 y.o. female who presents for evaluation of a possible skin infection located right lower leg. Symptoms include moderate pain and erythema. A lot of itching. Patient denies chills and fever greater than 100. Precipitating event: abrasion. Treatment to date has included abx with partial relief    Hypertension  This is a chronic problem. The current episode started more than 1 year ago. The problem has been gradually improving since onset. The problem is controlled. Pertinent negatives include no blurred vision, chest pain, headaches, malaise/fatigue, neck pain, orthopnea, palpitations, peripheral edema, PND, shortness of breath or sweats. Past treatments include diuretics and ACE inhibitors. The current treatment provides significant improvement. Diabetes  She presents for her follow-up diabetic visit. She has type 2 diabetes mellitus. Her disease course has been stable. There are no hypoglycemic associated symptoms. Pertinent negatives for hypoglycemia include no headaches or sweats. There are no diabetic associated symptoms. Pertinent negatives for diabetes include no blurred vision, no chest pain, no fatigue, no weakness and no weight loss. Symptoms are stable. Current diabetic treatment includes intensive insulin program. She is compliant with treatment most of the time. Her weight is fluctuating minimally. Her home blood glucose trend is fluctuating minimally. An ACE inhibitor/angiotensin II receptor blocker is being taken. Back Pain  This is a chronic problem. The current episode started more than 1 year ago.  The problem occurs constantly. The problem has been gradually worsening since onset. The pain is present in the lumbar spine. The quality of the pain is described as shooting and stabbing. The pain is severe. Associated symptoms include leg pain. Pertinent negatives include no abdominal pain, bladder incontinence, bowel incontinence, chest pain, dysuria, headaches, paresis, paresthesias, pelvic pain, perianal numbness, weakness or weight loss. She has tried analgesics for the symptoms. The treatment provided moderate relief. Gastroesophageal Reflux  She complains of heartburn. She reports no abdominal pain, no belching, no chest pain, no choking, no coughing, no dysphagia, no early satiety, no globus sensation, no hoarse voice, no nausea, no sore throat, no stridor, no tooth decay, no water brash or no wheezing. This is a chronic problem. The current episode started more than 1 year ago. The problem occurs occasionally. The problem has been waxing and waning. The symptoms are aggravated by certain foods. Pertinent negatives include no anemia, fatigue, melena, muscle weakness, orthopnea or weight loss. She has tried a PPI for the symptoms. The treatment provided significant relief. Review of Systems   Review of Systems   Constitutional: Negative. Negative for fatigue, malaise/fatigue and weight loss. HENT: Negative. Negative for hoarse voice and sore throat. Eyes: Negative. Negative for blurred vision. Respiratory: Negative. Negative for cough, choking, shortness of breath and wheezing. Cardiovascular: Negative. Negative for chest pain, palpitations, orthopnea and PND. Gastrointestinal: Positive for heartburn. Negative for abdominal pain, bowel incontinence, dysphagia, melena and nausea. Endocrine: Negative. Genitourinary: Negative. Negative for bladder incontinence, dysuria and pelvic pain. Musculoskeletal: Positive for back pain. Negative for muscle weakness and neck pain. Skin: Negative. History:   has no past medical history on file. Social History:   reports that she has been smoking cigarettes. She has a 15.00 pack-year smoking history. She has never used smokeless tobacco. She reports that she does not drink alcohol and does not use drugs. Family History: No family history on file. Surgical History:   Past Surgical History:   Procedure Laterality Date    COLONOSCOPY  06/2016    poor prep, there appears to be some polyps in the colon mixed with stool, could not be removed        Physical Examination      Vitals:  /88 (Site: Left Upper Arm, Position: Sitting, Cuff Size: Large Adult)   Pulse 86   Temp 97.1 °F (36.2 °C) (Temporal)   Wt 256 lb (116.1 kg)   SpO2 99%   BMI 39.50 kg/m²     Physical Exam  Vitals and nursing note reviewed. Constitutional:       General: She is not in acute distress. Appearance: Normal appearance. She is obese. She is not ill-appearing, toxic-appearing or diaphoretic. HENT:      Head: Normocephalic and atraumatic. Eyes:      General: No scleral icterus. Right eye: No discharge. Left eye: No discharge. Extraocular Movements: Extraocular movements intact. Conjunctiva/sclera: Conjunctivae normal.   Cardiovascular:      Rate and Rhythm: Normal rate and regular rhythm. Pulses: Normal pulses. Heart sounds: Normal heart sounds. No murmur heard. No friction rub. No gallop. Pulmonary:      Effort: Pulmonary effort is normal. No respiratory distress. Breath sounds: Normal breath sounds. No stridor. No wheezing, rhonchi or rales. Chest:      Chest wall: No tenderness. Skin:     Findings: Rash (tinea pedis right heel) and wound (1cm x3cm superficial right lower leg) present. Neurological:      Mental Status: She is alert and oriented to person, place, and time. Mental status is at baseline.    Psychiatric:         Mood and Affect: Mood normal.         Behavior: Behavior normal.         Thought Content: Thought content normal.         Judgment: Judgment normal.         Labs/Imaging/Diagnostics   Labs:  Hemoglobin A1C   Date Value Ref Range Status   03/04/2021 7.5 % Final       Imaging Last 24 Hours:  MRI LUMBAR SPINE WO CONTRAST  Narrative: EXAMINATION:  MRI OF THE LUMBAR SPINE WITHOUT CONTRAST, 3/23/2021 3:18 pm    TECHNIQUE:  Multiplanar multisequence MRI of the lumbar spine was performed without the  administration of intravenous contrast.    COMPARISON:  03/02/2015    HISTORY:  ORDERING SYSTEM PROVIDED HISTORY: Lumbar stenosis with neurogenic claudication  TECHNOLOGIST PROVIDED HISTORY:  see icd10  Reason for Exam: low back pain w/bilateral sciatica pain  Acuity: Chronic  Type of Exam: Subsequent/Follow-up    FINDINGS:  BONES/ALIGNMENT: Lumbar spine alignment is normal.  Lumbar vertebral bodies  are normal in height. No acute lumbar spine fracture. The marrow signal  pattern throughout the lumbar spine is normal.    SPINAL CORD: The conus terminates normally. SOFT TISSUES: No paraspinal mass identified. L1-L2: There is no significant disc herniation, spinal canal stenosis or  neural foraminal narrowing. L2-L3: There is no significant disc herniation, spinal canal stenosis or  neural foraminal narrowing. Mild facet joint DJD. L3-L4: Mild DDD with disc height loss and desiccation. Bilateral facet joint  DJD and ligamentum flavum thickening. Mild spinal canal stenosis. No  significant neural foraminal stenosis. L4-L5: Mild DDD. Posterior annular fissure. Diffuse disc bulge measures 3  mm. Bilateral facet joint DJD and ligamentum flavum thickening. Mild spinal  canal stenosis. No significant neural foraminal stenosis. L5-S1: There is no significant disc herniation, spinal canal stenosis or  neural foraminal narrowing. Bilateral facet joint DJD.   Impression: Mild scattered degenerate spondylosis throughout the lumbar spine without  high-grade lumbar spinal canal or high-grade neural foraminal stenosis.

## 2022-01-17 NOTE — TELEPHONE ENCOUNTER
Jesse Catalan is calling to request a refill on the following medication(s):    Medication Request:  Requested Prescriptions     Pending Prescriptions Disp Refills    blood glucose test strips (ASCENSIA AUTODISC VI;ONE TOUCH ULTRA TEST VI) strip 100 strip 11     Sig: Use with associated glucose meter.  Has One Touch Meter,  Testing 5-6 times daily       Last Visit Date (If Applicable):  1/11/7354    Next Visit Date:    4/15/2022

## 2022-02-08 DIAGNOSIS — M79.7 FIBROMYALGIA: ICD-10-CM

## 2022-02-08 DIAGNOSIS — J30.2 SEASONAL ALLERGIC RHINITIS, UNSPECIFIED TRIGGER: ICD-10-CM

## 2022-02-08 DIAGNOSIS — L03.115 CELLULITIS OF RIGHT LOWER EXTREMITY: ICD-10-CM

## 2022-02-08 RX ORDER — HYDROXYZINE HYDROCHLORIDE 25 MG/1
25 TABLET, FILM COATED ORAL EVERY 8 HOURS PRN
Qty: 30 TABLET | Refills: 0 | Status: SHIPPED | OUTPATIENT
Start: 2022-02-08 | End: 2022-03-17 | Stop reason: SDUPTHER

## 2022-02-08 RX ORDER — FLUTICASONE PROPIONATE 50 MCG
SPRAY, SUSPENSION (ML) NASAL
Qty: 1 EACH | Refills: 5 | Status: SHIPPED | OUTPATIENT
Start: 2022-02-08 | End: 2022-03-08 | Stop reason: SDUPTHER

## 2022-02-08 RX ORDER — TRAMADOL HYDROCHLORIDE 50 MG/1
100 TABLET ORAL EVERY 8 HOURS PRN
Qty: 180 TABLET | Refills: 0 | Status: SHIPPED | OUTPATIENT
Start: 2022-02-09 | End: 2022-03-08 | Stop reason: SDUPTHER

## 2022-02-08 NOTE — LETTER
CONTROLLED SUBSTANCE MEDICATION AGREEMENT     Patient Name: Tiffany Boucher  Patient YOB: 1960   I understand, that controlled substance medications may be used to help better manage my symptoms and to improve my ability to function at home, work and in social settings. However, I also understand that these medications do have risks, which have been discussed with me, including possible development of physical or psychological dependence. I understand that successful treatment requires mutual trust and honesty between me and my provider. I understand and agree that following this Medication Agreement is necessary in continuing my provider-patient relationship and the success of my treatment plan. Explanation from my Provider: Benefits and Goals of Controlled Substance Medications: There are two potential goals for your treatment: (1) decreased pain and suffering (2) improved daily life functions. There are many possible treatments for your chronic condition(s). Alternatives such as physical therapy, yoga, massage, home daily exercise, meditation, relaxation techniques, injections, chiropractic manipulations, surgery, cognitive therapy, hypnosis and many medications that are not habit-forming may be used. Use of controlled substance medications may be helpful, but they are unlikely to resolve all symptoms or restore all function. Explanation from my Provider: Risks of Controlled Substance Medications:  Opioid pain medications: These medications can lead to problems such as addiction/dependence, sedation, lightheadedness/dizziness, memory issues, falls, constipation, nausea, or vomiting. They may also impair the ability to drive or operate machinery. Additionally, these medications may lower testosterone levels, leading to loss of bone strength, stamina and sex drive.   They may cause problems with breathing, sleep apnea and reduced coughing, which is especially dangerous for patients with lung disease. Overdose or dangerous interactions with alcohol and other medications may occur, leading to death. Hyperalgesia may develop, which means patients receiving opioids for the treatment of pain may become more sensitive to certain painful stimuli, and in some cases, experience pain from ordinarily non-painful stimuli. Women between the ages of 14-53 who could become pregnant should carefully weigh the risks and benefits of opioids with their physicians, as these medications increase the risk of pregnancy complications, including miscarriage,  delivery and stillbirth. It is also possible for babies to be born addicted to opioids. Opioid dependence withdrawal symptoms may include; feelings of uneasiness, increased pain, irritability, belly pain, diarrhea, sweats and goose-flesh. Benzodiazepines and non-benzodiazepine sleep medications: These medications can lead to problems such as addiction/dependence, sedation, fatigue, lightheadedness, dizziness, incoordination, falls, depression, hallucinations, and impaired judgment, memory and concentration. The ability to drive and operate machinery may also be affected. Abnormal sleep-related behaviors have been reported, including sleepwalking, driving, making telephone calls, eating, or having sex while not fully awake. These medications can suppress breathing and worsen sleep apnea, particularly when combined with alcohol or other sedating medications, potentially leading to death. Dependence withdrawal symptoms may include tremors, anxiety, hallucinations and seizures. Stimulants:  Common adverse effects include addiction/dependence, increased blood  pressure and heart rate, decreased appetite, nausea, involuntary weight loss, insomnia,                                                                                                                     Initials:_______   irritability, and headaches.   These risks may increase when these medications are combined with other stimulants, such as caffeine pills or energy drinks, certain weight loss supplements and oral decongestants. Dependence withdrawal symptoms may include depressed mood, loss of interest, suicidal thoughts, anxiety, fatigue, appetite changes and agitation. Testosterone replacement therapy:  Potential side effects include increased risk of stroke and heart attack, blood clots, increased blood pressure, increased cholesterol, enlarged prostate, sleep apnea, irritability/aggression and other mood disorders, and decreased fertility. I agree and understand that I and my prescriber have the following rights and responsibilities regarding my treatment plan:     1. MY RIGHTS:  To be informed of my treatment and medication plan. To be an active participant in my health and wellbeing. 2. MY RESPONSIBILITY AND UNDERSTANDING FOR USE OF MEDICATIONS   I will take medications at the dose and frequency as directed. For my safety, I will not increase or change how I take my medications without the recommendation of my healthcare provider.  I will actively participate in any program recommended by my provider which may improve function, including social, physical, psychological programs.  I will not take my medications with alcohol or other drugs not prescribed to me. I understand that drinking alcohol with my medications increases the chances of side effects, including reduced breathing rate and could lead to personal injury when operating machinery.  I understand that if I have a history of substance use disorders, including alcohol or other illicit drugs, that I may be at increased risk of addiction to my medications.  I agree to notify my provider immediately if I should become pregnant so that my treatment plan can be adjusted.    I agree and understand that I shall only receive controlled substance medications from the prescriber that signed this agreement unless there is written agreement among other prescribers of controlled substances outlining the responsibility of the medications being prescribed.  I understand that the if the controlled medication is not helping to achieve goals, the dosage may be tapered and no longer prescribed. 3. MY RESPONSIBILITY FOR COMMUNICATION / PRESCRIPTION RENEWALS   I agree that all controlled substance medications that I take will be prescribed only by my provider. If another healthcare provider prescribes me medication in an emergency, I will notify my provider within seventy-two (72) hours.  I will arrange for refills at the prescribed interval ONLY during regular office hours. I will not ask for refills earlier than agreed, after-hours, on holidays or weekends. Refills may take up to 72 hours for processing and prescriptions to reach the pharmacy.  I will inform my other health care providers that I am taking these medications and of the existence of this [id number]. In the event of an emergency, I will provide the same information to the emergency department prescribers.  I will keep my provider updated on the pharmacy I am using for controlled medication prescription filling. Initials:_______  4. MY RESPONSIBILITY FOR PROTECTING MEDICATIONS   I will protect my prescriptions and medications. I understand that lost or misplaced prescriptions will not be replaced.  I will keep medications only for my own use and will not share them with others. I will keep all medications away from children.  I agree that if my medications are adjusted or discontinued, I will properly dispose of any remaining medications. I understand that I will be required to dispose of any remaining controlled medications as, directed by my prescriber, prior to being provided with any prescriptions for other controlled medications.   Medication drop box locations can be found at: [email or web address]    5. MY RESPONSIBILITY WITH ILLEGAL DRUGS    I will not use illegal or street drugs or another person's prescription medications not prescribed to me.  If there are identified addiction type symptoms, then referral to a program may be provided by my provider and I agree to follow through with this recommendation. 6. MY RESPONSIBILITY FOR COOPERATION WITH INVESTIGATIONS   I understand that my provider will comply with any applicable law and may discuss my use and/or possible misuse/abuse of controlled substances and alcohol, as appropriate, with any health care provider involved in my care, pharmacist, or legal authority.  I authorize my provider and pharmacy to cooperate fully with law enforcement agencies (as permitted by law) in the investigation of any possible misuse, sale, or other diversion of my controlled substances.  I agree to waive any applicable privilege or right of privacy or confidentiality with respect to these authorizations. 7. PROVIDERS RIGHT TO MONITOR FOR SAFETY: PRESCRIPTION MONITORING / DRUG TESTING   I consent to drug/toxicology screening and will submit to a drug screen upon my providers request to assure I am only taking the prescribed drugs for my safety monitoring. I understand that a drug screen is a laboratory test in which a sample of my urine, blood or saliva is checked to see what drugs I have been taking. This may entail an observed urine specimen, which means that a nurse or other health care provider may watch me provide urine, and I will cooperate if I am asked to provide an observed specimen.  I understand that my provider will check a copy of my State Prescription Monitoring Program () Report in order to safely prescribe medications.  Pill Counts: I consent to pill counts when requested.   I may be asked to bring all my prescribed controlled substance medications, in their original bottles, to all of my scheduled appointments. In addition, my provider may ask me to come to the practice at any time for a random pill count. 8. TERMINATION OF THIS AGREEMENT  For my safety, my prescriber has the right to stop prescribing controlled substance medications and may end this agreement. Initials:_______   Conditions that may result in termination of this agreement:  a. I do not show any improvement in pain, or my activity has not improved. b. I develop rapid tolerance or loss of improvement, as described in my treatment plan.  c. I develop significant side effects from the medication. d. My behavior is not consistent with the responsibilities outlined above, thereby causing safety concerns to continue prescribing controlled substance medications. e. I fail to follow the terms of this agreement. f. Other:____________________________       UNDERSTANDING THIS MEDICATION AGREEMENT:    I have read the above and have had all my questions answered. For chronic disease management, I know that my symptoms can be managed with many types of treatments. A chronic medication trial may be part of my treatment, but I must be an active participant in my care. Medication therapy is only one part of my symptom management plan. In some cases, there may be limited scientific evidence to support the chronic use of certain medications to improve symptoms and daily function. Furthermore, in certain circumstances, there may be scientific information that suggests that the use of chronic controlled substances may worsen my symptoms and increase my risk of unintentional death directly related to this medication therapy. I know that if my provider feels my risk from controlled medications is greater than my benefit, I will have my controlled substance medication(s) compassionately lowered or removed altogether.      I further agree to allow this office to contact my HIPAA contact if there are concerns about my safety and use of the controlled medications. I have agreed to use the prescribed controlled substance medications to me as instructed by my provider and as stated in this Medication Agreement. My initial on each page and my signature below shows that I have read each page and I have had the opportunity to ask questions with answers provided by my provider.     Patient Name (Printed): _____________________________________  Patient Signature:  ______________________   Date: _____________    Prescriber Name (Printed): ___________________________________  Prescriber Signature: _____________________  Date: _____________

## 2022-02-08 NOTE — TELEPHONE ENCOUNTER
Dell Quintanilla is calling to request a refill on the following medication(s):    Medication Request:  Requested Prescriptions     Pending Prescriptions Disp Refills    fluticasone (FLONASE) 50 MCG/ACT nasal spray 1 each 5     Sig: SPRAY TWO SPRAYS IN EACH NOSTRIL ONCE DAILY    hydrOXYzine (ATARAX) 25 MG tablet 30 tablet 0     Sig: Take 1 tablet by mouth every 8 hours as needed for Itching    traMADol (ULTRAM) 50 MG tablet 180 tablet 0     Sig: Take 2 tablets by mouth every 8 hours as needed for Pain for up to 30 days.        Last Visit Date (If Applicable):  6/33/0150    Next Visit Date:    4/15/2022

## 2022-02-10 ENCOUNTER — TELEPHONE (OUTPATIENT)
Dept: FAMILY MEDICINE CLINIC | Age: 62
End: 2022-02-10

## 2022-02-10 NOTE — TELEPHONE ENCOUNTER
----- Message from Keely Verduzco sent at 2/10/2022  3:58 PM EST -----  Subject: Message to Provider    QUESTIONS  Information for Provider? Patient wants to come in on Friday, 2/11 for a   B-12 shot. I am not able to reach the office. Please reach out to patient.  ---------------------------------------------------------------------------  --------------  CALL BACK INFO  What is the best way for the office to contact you? OK to leave message on   voicemail  Preferred Call Back Phone Number? [phone]  ---------------------------------------------------------------------------  --------------  SCRIPT ANSWERS  Relationship to Patient?  Self

## 2022-02-11 ENCOUNTER — NURSE ONLY (OUTPATIENT)
Dept: FAMILY MEDICINE CLINIC | Age: 62
End: 2022-02-11
Payer: MEDICARE

## 2022-02-11 DIAGNOSIS — E53.8 VITAMIN B12 DEFICIENCY: Primary | ICD-10-CM

## 2022-02-11 PROCEDURE — 96372 THER/PROPH/DIAG INJ SC/IM: CPT | Performed by: FAMILY MEDICINE

## 2022-02-11 RX ORDER — CYANOCOBALAMIN 1000 UG/ML
1000 INJECTION INTRAMUSCULAR; SUBCUTANEOUS ONCE
Status: COMPLETED | OUTPATIENT
Start: 2022-02-11 | End: 2022-02-11

## 2022-02-11 RX ADMIN — CYANOCOBALAMIN 1000 MCG: 1000 INJECTION INTRAMUSCULAR; SUBCUTANEOUS at 10:33

## 2022-03-07 DIAGNOSIS — K21.9 GASTROESOPHAGEAL REFLUX DISEASE, UNSPECIFIED WHETHER ESOPHAGITIS PRESENT: ICD-10-CM

## 2022-03-07 DIAGNOSIS — M79.7 FIBROMYALGIA: ICD-10-CM

## 2022-03-07 DIAGNOSIS — I10 ESSENTIAL (PRIMARY) HYPERTENSION: ICD-10-CM

## 2022-03-07 DIAGNOSIS — J30.2 SEASONAL ALLERGIC RHINITIS, UNSPECIFIED TRIGGER: ICD-10-CM

## 2022-03-07 NOTE — TELEPHONE ENCOUNTER
Prakash Kingston is calling to request a refill on the following medication(s):    Medication Request:  Requested Prescriptions     Pending Prescriptions Disp Refills    fluticasone (FLONASE) 50 MCG/ACT nasal spray 1 each 5     Sig: SPRAY TWO SPRAYS IN EACH NOSTRIL ONCE DAILY    traMADol (ULTRAM) 50 MG tablet 180 tablet 0     Sig: Take 2 tablets by mouth every 8 hours as needed for Pain for up to 30 days.     lisinopril-hydroCHLOROthiazide (PRINZIDE;ZESTORETIC) 20-12.5 MG per tablet 30 tablet 5     Sig: Take 1 tablet by mouth daily    omeprazole (PRILOSEC) 40 MG delayed release capsule 30 capsule 5     Sig: Take 1 capsule by mouth every morning (before breakfast)       Last Visit Date (If Applicable):  4/25/1565    Next Visit Date:    4/15/2022

## 2022-03-08 RX ORDER — LISINOPRIL AND HYDROCHLOROTHIAZIDE 20; 12.5 MG/1; MG/1
1 TABLET ORAL DAILY
Qty: 30 TABLET | Refills: 5 | Status: SHIPPED | OUTPATIENT
Start: 2022-03-08

## 2022-03-08 RX ORDER — OMEPRAZOLE 40 MG/1
40 CAPSULE, DELAYED RELEASE ORAL
Qty: 30 CAPSULE | Refills: 5 | Status: SHIPPED | OUTPATIENT
Start: 2022-03-08 | End: 2022-06-10 | Stop reason: SDUPTHER

## 2022-03-08 RX ORDER — TRAMADOL HYDROCHLORIDE 50 MG/1
100 TABLET ORAL EVERY 8 HOURS PRN
Qty: 42 TABLET | Refills: 0 | Status: SHIPPED | OUTPATIENT
Start: 2022-03-08 | End: 2022-03-15

## 2022-03-08 RX ORDER — FLUTICASONE PROPIONATE 50 MCG
SPRAY, SUSPENSION (ML) NASAL
Qty: 1 EACH | Refills: 5 | Status: SHIPPED | OUTPATIENT
Start: 2022-03-08 | End: 2022-04-13 | Stop reason: SDUPTHER

## 2022-03-08 NOTE — TELEPHONE ENCOUNTER
I have given one week of Tramadol. Mary Anne Dawkins needs to get the [facility] and UDS done that was ordered last month.  I need these done before I can get her full refill of medication

## 2022-03-16 ENCOUNTER — OFFICE VISIT (OUTPATIENT)
Dept: FAMILY MEDICINE CLINIC | Age: 62
End: 2022-03-16
Payer: MEDICARE

## 2022-03-16 ENCOUNTER — HOSPITAL ENCOUNTER (OUTPATIENT)
Age: 62
Setting detail: SPECIMEN
Discharge: HOME | End: 2022-03-16

## 2022-03-16 VITALS
HEART RATE: 100 BPM | OXYGEN SATURATION: 98 % | WEIGHT: 252.6 LBS | DIASTOLIC BLOOD PRESSURE: 100 MMHG | SYSTOLIC BLOOD PRESSURE: 150 MMHG | BODY MASS INDEX: 38.98 KG/M2

## 2022-03-16 DIAGNOSIS — M79.7 FIBROMYALGIA: ICD-10-CM

## 2022-03-16 DIAGNOSIS — M15.9 PRIMARY OSTEOARTHRITIS INVOLVING MULTIPLE JOINTS: ICD-10-CM

## 2022-03-16 DIAGNOSIS — M51.36 DDD (DEGENERATIVE DISC DISEASE), LUMBAR: ICD-10-CM

## 2022-03-16 DIAGNOSIS — E11.9 TYPE 2 DIABETES MELLITUS WITHOUT COMPLICATION, WITH LONG-TERM CURRENT USE OF INSULIN (HCC): ICD-10-CM

## 2022-03-16 DIAGNOSIS — Z13.220 LIPID SCREENING: ICD-10-CM

## 2022-03-16 DIAGNOSIS — E53.8 VITAMIN B12 DEFICIENCY: Primary | ICD-10-CM

## 2022-03-16 DIAGNOSIS — Z79.4 TYPE 2 DIABETES MELLITUS WITHOUT COMPLICATION, WITH LONG-TERM CURRENT USE OF INSULIN (HCC): ICD-10-CM

## 2022-03-16 DIAGNOSIS — I10 ESSENTIAL (PRIMARY) HYPERTENSION: ICD-10-CM

## 2022-03-16 PROCEDURE — 3017F COLORECTAL CA SCREEN DOC REV: CPT | Performed by: FAMILY MEDICINE

## 2022-03-16 PROCEDURE — 99214 OFFICE O/P EST MOD 30 MIN: CPT | Performed by: FAMILY MEDICINE

## 2022-03-16 PROCEDURE — G8484 FLU IMMUNIZE NO ADMIN: HCPCS | Performed by: FAMILY MEDICINE

## 2022-03-16 PROCEDURE — 2022F DILAT RTA XM EVC RTNOPTHY: CPT | Performed by: FAMILY MEDICINE

## 2022-03-16 PROCEDURE — 3046F HEMOGLOBIN A1C LEVEL >9.0%: CPT | Performed by: FAMILY MEDICINE

## 2022-03-16 PROCEDURE — 4004F PT TOBACCO SCREEN RCVD TLK: CPT | Performed by: FAMILY MEDICINE

## 2022-03-16 PROCEDURE — G8427 DOCREV CUR MEDS BY ELIG CLIN: HCPCS | Performed by: FAMILY MEDICINE

## 2022-03-16 PROCEDURE — G8417 CALC BMI ABV UP PARAM F/U: HCPCS | Performed by: FAMILY MEDICINE

## 2022-03-16 RX ORDER — CYANOCOBALAMIN 1000 UG/ML
1000 INJECTION INTRAMUSCULAR; SUBCUTANEOUS ONCE
Status: COMPLETED | OUTPATIENT
Start: 2022-03-16 | End: 2022-03-16

## 2022-03-16 RX ORDER — OXYCODONE HYDROCHLORIDE AND ACETAMINOPHEN 5; 325 MG/1; MG/1
1 TABLET ORAL EVERY 6 HOURS PRN
Qty: 120 TABLET | Refills: 0 | Status: SHIPPED | OUTPATIENT
Start: 2022-03-16 | End: 2022-04-13 | Stop reason: SDUPTHER

## 2022-03-16 RX ADMIN — CYANOCOBALAMIN 1000 MCG: 1000 INJECTION INTRAMUSCULAR; SUBCUTANEOUS at 16:24

## 2022-03-16 ASSESSMENT — ENCOUNTER SYMPTOMS
EYES NEGATIVE: 1
RESPIRATORY NEGATIVE: 1
GASTROINTESTINAL NEGATIVE: 1
ORTHOPNEA: 0
BACK PAIN: 1
SHORTNESS OF BREATH: 0
BOWEL INCONTINENCE: 0
ALLERGIC/IMMUNOLOGIC NEGATIVE: 1
BLURRED VISION: 0

## 2022-03-16 ASSESSMENT — PATIENT HEALTH QUESTIONNAIRE - PHQ9
1. LITTLE INTEREST OR PLEASURE IN DOING THINGS: 0
SUM OF ALL RESPONSES TO PHQ QUESTIONS 1-9: 0
3. TROUBLE FALLING OR STAYING ASLEEP: 0
8. MOVING OR SPEAKING SO SLOWLY THAT OTHER PEOPLE COULD HAVE NOTICED. OR THE OPPOSITE, BEING SO FIGETY OR RESTLESS THAT YOU HAVE BEEN MOVING AROUND A LOT MORE THAN USUAL: 0
SUM OF ALL RESPONSES TO PHQ QUESTIONS 1-9: 0
SUM OF ALL RESPONSES TO PHQ9 QUESTIONS 1 & 2: 0
10. IF YOU CHECKED OFF ANY PROBLEMS, HOW DIFFICULT HAVE THESE PROBLEMS MADE IT FOR YOU TO DO YOUR WORK, TAKE CARE OF THINGS AT HOME, OR GET ALONG WITH OTHER PEOPLE: 0
SUM OF ALL RESPONSES TO PHQ9 QUESTIONS 1 & 2: 0
SUM OF ALL RESPONSES TO PHQ QUESTIONS 1-9: 0
6. FEELING BAD ABOUT YOURSELF - OR THAT YOU ARE A FAILURE OR HAVE LET YOURSELF OR YOUR FAMILY DOWN: 0
5. POOR APPETITE OR OVEREATING: 0
4. FEELING TIRED OR HAVING LITTLE ENERGY: 0
7. TROUBLE CONCENTRATING ON THINGS, SUCH AS READING THE NEWSPAPER OR WATCHING TELEVISION: 0
SUM OF ALL RESPONSES TO PHQ QUESTIONS 1-9: 0
1. LITTLE INTEREST OR PLEASURE IN DOING THINGS: 0
SUM OF ALL RESPONSES TO PHQ QUESTIONS 1-9: 0
2. FEELING DOWN, DEPRESSED OR HOPELESS: 0
2. FEELING DOWN, DEPRESSED OR HOPELESS: 0
9. THOUGHTS THAT YOU WOULD BE BETTER OFF DEAD, OR OF HURTING YOURSELF: 0

## 2022-03-16 NOTE — PROGRESS NOTES
[facility] Progress Note    Date:3/16/2022         Patient Salvador Roth     YOB: 1960     Age:61 y.o.     Assessment/Plan        Problem List Items Addressed This Visit        Circulatory    Essential (primary) hypertension      Well-controlled, continue current medications, continue current treatment plan, medication adherence emphasized and lifestyle modifications recommended            Endocrine    Type 2 diabetes mellitus without complication, with long-term current use of insulin (HCC)      Unclear control, continue current plan pending work up below, medication adherence emphasized and lifestyle modifications recommended         Relevant Orders    Hemoglobin A1C       Musculoskeletal and Integument    Primary osteoarthritis involving multiple joints     Discussed that I cannot increase pain medication  Will trial changing from Tramadol to Percocet - any further increase in pain medication past MEDD 30 will require referral to pain management         Relevant Medications    oxyCODONE-acetaminophen (PERCOCET) 5-325 MG per tablet    DDD (degenerative disc disease), lumbar     Discussed that I cannot increase pain medication  Will trial changing from Tramadol to Percocet - any further increase in pain medication past MEDD 30 will require referral to pain management         Relevant Medications    oxyCODONE-acetaminophen (PERCOCET) 5-325 MG per tablet       Other    Fibromyalgia     Discussed that I cannot increase pain medication  Will trial changing from Tramadol to Percocet - any further increase in pain medication past MEDD 30 will require referral to pain management         Relevant Medications    oxyCODONE-acetaminophen (PERCOCET) 5-325 MG per tablet    Vitamin B12 deficiency - Primary    Relevant Orders    CBC with Auto Differential    Vitamin B12 & Folate      Other Visit Diagnoses     Lipid screening        Relevant Orders    Lipid Panel           Return in about 3 months (around 6/16/2022). Electronically signed by Patsy Yuen DO on 3/16/22       Total time spent was between Time personally spent assessing and managing the patient on the date of service: Est: 30-39 minutes ([id number]) mins. This included time spent reviewing the patient's medical record (e.g., recent visits, labs, and studies); seeing the patient in the office (face-to-face time); ordering medications, studies, procedures, or referrals; calling the patient or family later in the day with results and further recommendations; and documenting the visit in the medical record. Subjective     Christine Max is a 64 y.o. female presenting today for   Chief Complaint   Patient presents with    Injections     b12    Other     drug screen and contract    Hand Problem     hand pain   . Christine Max is an 64 y.o. female who presents with arthralgias that began several months ago. Pain is located in multiple joints. The pain is described as severe, aching. Associated symptoms include: crepitation, decreased range of motion, edema, erythema and tenderness. The patient has tried tramadol for pain, with moderate relief. Related to injury: no.    Hypertension  This is a chronic problem. The current episode started more than 1 year ago. The problem has been gradually improving since onset. The problem is controlled. Pertinent negatives include no blurred vision, malaise/fatigue, orthopnea, palpitations, peripheral edema, PND, shortness of breath or sweats. Past treatments include diuretics and ACE inhibitors. The current treatment provides significant improvement. Diabetes  She presents for her follow-up diabetic visit. She has type 2 diabetes mellitus. Her disease course has been stable. There are no hypoglycemic associated symptoms. Pertinent negatives for hypoglycemia include no sweats. There are no diabetic associated symptoms. Pertinent negatives for diabetes include no blurred vision. Symptoms are stable. Current diabetic treatment includes intensive insulin program. She is compliant with treatment most of the time. Her weight is fluctuating minimally. Her home blood glucose trend is fluctuating minimally. An ACE inhibitor/angiotensin II receptor blocker is being taken. Back Pain  This is a chronic problem. The current episode started more than 1 year ago. The problem occurs constantly. The problem has been gradually worsening since onset. The pain is present in the lumbar spine. The quality of the pain is described as shooting and stabbing. The pain is severe. Associated symptoms include leg pain. Pertinent negatives include no bladder incontinence, bowel incontinence, dysuria, paresis, paresthesias, pelvic pain or perianal numbness. She has tried analgesics for the symptoms. The treatment provided moderate relief. Review of Systems   Review of Systems   Constitutional: Negative. Negative for malaise/fatigue. HENT: Negative. Eyes: Negative. Negative for blurred vision. Respiratory: Negative. Negative for shortness of breath. Cardiovascular: Negative. Negative for palpitations, orthopnea and PND. Gastrointestinal: Negative. Negative for bowel incontinence. Endocrine: Negative. Genitourinary: Negative. Negative for bladder incontinence, dysuria and pelvic pain. Musculoskeletal: Positive for back pain. Skin: Negative. Allergic/Immunologic: Negative. Neurological: Negative. Negative for paresthesias. Hematological: Negative. Psychiatric/Behavioral: Negative. All other systems reviewed and are negative. Medications     Current Outpatient Medications   Medication Sig Dispense Refill    oxyCODONE-acetaminophen (PERCOCET) 5-325 MG per tablet Take 1 tablet by mouth every 6 hours as needed for Pain for up to 30 days.  Take lowest dose possible to manage pain 120 tablet 0    fluticasone (FLONASE) 50 MCG/ACT nasal spray SPRAY TWO SPRAYS IN EACH NOSTRIL ONCE DAILY 1 each 5  lisinopril-hydroCHLOROthiazide (PRINZIDE;ZESTORETIC) 20-12.5 MG per tablet Take 1 tablet by mouth daily 30 tablet 5    omeprazole (PRILOSEC) 40 MG delayed release capsule Take 1 capsule by mouth every morning (before breakfast) 30 capsule 5    blood glucose test strips (ASCENSIA AUTODISC VI;ONE TOUCH ULTRA TEST VI) strip Use with associated glucose meter. Has One Touch Meter,  Testing 5-6 times daily 100 strip 11    blood glucose monitor strips Test 4  times a day as needed for symptoms of irregular blood glucose. Pt Has One Touch Ultra 150 strip 1    insulin glargine (LANTUS SOLOSTAR) 100 UNIT/ML injection pen Inject 20 Units into the skin 2 times daily 5 pen 2    furosemide (LASIX) 20 MG tablet Take 1 tablet by mouth daily 30 tablet 0    mupirocin (BACTROBAN) 2 % ointment AFFECTED AREA(S) THREE TIMES A DAY 15 g 2    metoprolol tartrate (LOPRESSOR) 50 MG tablet Take 1 tablet by mouth daily 60 tablet 5    ULTICARE MICRO PEN NEEDLES 32G X 4 MM MISC Inject 100 each into the skin 2 times daily 100 each 3    Lancets MISC Pt testing twice daily 100 each 5    fluocinonide (LIDEX) 0.05 % cream Apply topically 2 times daily. 120 g 2    aspirin-acetaminophen-caffeine (EXCEDRIN MIGRAINE) 274-321-20 MG per tablet Take 2 tablets by mouth 2 times daily       No current facility-administered medications for this visit. Past History    Past Medical History:   has no past medical history on file. Social History:   reports that she has been smoking cigarettes. She has a 15.00 pack-year smoking history. She has never used smokeless tobacco. She reports that she does not drink alcohol and does not use drugs. Family History: History reviewed. No pertinent family history.     Surgical History:   Past Surgical History:   Procedure Laterality Date    COLONOSCOPY  06/2016    poor prep, there appears to be some polyps in the colon mixed with stool, could not be removed        Physical Examination      Vitals:  BP (!) 150/100 (Site: Left Upper Arm, Position: Sitting, Cuff Size: Medium Adult)   Pulse 100   Wt 252 lb 9.6 oz (114.6 kg)   SpO2 98%   BMI 38.98 kg/m²     Physical Exam  Vitals and nursing note reviewed. Constitutional:       General: She is not in acute distress. Appearance: Normal appearance. She is obese. She is not ill-appearing, toxic-appearing or diaphoretic. HENT:      Head: Normocephalic and atraumatic. Right Ear: Tympanic membrane, ear canal and external ear normal. There is no impacted cerumen. Left Ear: Tympanic membrane, ear canal and external ear normal. There is no impacted cerumen. Nose: Nose normal. No congestion or rhinorrhea. Mouth/Throat:      Mouth: Mucous membranes are moist.      Pharynx: Oropharynx is clear. No oropharyngeal exudate or posterior oropharyngeal erythema. Eyes:      General: No scleral icterus. Right eye: No discharge. Left eye: No discharge. Extraocular Movements: Extraocular movements intact. Conjunctiva/sclera: Conjunctivae normal.      Pupils: Pupils are equal, round, and reactive to light. Cardiovascular:      Rate and Rhythm: Normal rate and regular rhythm. Pulses: Normal pulses. Heart sounds: Normal heart sounds. No murmur heard. No friction rub. No gallop. Pulmonary:      Effort: Pulmonary effort is normal. No respiratory distress. Breath sounds: Normal breath sounds. No stridor. No wheezing, rhonchi or rales. Chest:      Chest wall: No tenderness. Abdominal:      General: Abdomen is flat. Bowel sounds are normal. There is no distension. Palpations: Abdomen is soft. There is no mass. Tenderness: There is no abdominal tenderness. There is no right CVA tenderness, left CVA tenderness, guarding or rebound. Hernia: No hernia is present. Musculoskeletal:      Cervical back: Normal range of motion and neck supple. Lumbar back: Spasms and tenderness present.  No swelling, edema, deformity, lacerations or bony tenderness. Decreased range of motion. Skin:     General: Skin is warm. Capillary Refill: Capillary refill takes less than 2 seconds. Coloration: Skin is not jaundiced or pale. Findings: No bruising, erythema, lesion or rash. Neurological:      General: No focal deficit present. Mental Status: She is alert and oriented to person, place, and time. Mental status is at baseline. Cranial Nerves: No cranial nerve deficit. Sensory: No sensory deficit. Motor: No weakness. Coordination: Coordination normal.      Gait: Gait normal.      Deep Tendon Reflexes: Reflexes normal.   Psychiatric:         Mood and Affect: Mood normal.         Behavior: Behavior normal.         Thought Content: Thought content normal.         Judgment: Judgment normal.         Labs/Imaging/Diagnostics   Labs:  Hemoglobin A1C   Date Value Ref Range Status   03/04/2021 7.5 % Final       Imaging Last 24 Hours:  MRI LUMBAR SPINE WO CONTRAST  Narrative: EXAMINATION:  MRI OF THE LUMBAR SPINE WITHOUT CONTRAST, 3/23/2021 3:18 pm    TECHNIQUE:  Multiplanar multisequence MRI of the lumbar spine was performed without the  administration of intravenous contrast.    COMPARISON:  03/02/2015    HISTORY:  ORDERING SYSTEM PROVIDED HISTORY: Lumbar stenosis with neurogenic claudication  TECHNOLOGIST PROVIDED HISTORY:  see icd10  Reason for Exam: low back pain w/bilateral sciatica pain  Acuity: Chronic  Type of Exam: Subsequent/Follow-up    FINDINGS:  BONES/ALIGNMENT: Lumbar spine alignment is normal.  Lumbar vertebral bodies  are normal in height. No acute lumbar spine fracture. The marrow signal  pattern throughout the lumbar spine is normal.    SPINAL CORD: The conus terminates normally. SOFT TISSUES: No paraspinal mass identified. L1-L2: There is no significant disc herniation, spinal canal stenosis or  neural foraminal narrowing.     L2-L3: There is no significant disc herniation, spinal canal stenosis or  neural foraminal narrowing. Mild facet joint DJD. L3-L4: Mild DDD with disc height loss and desiccation. Bilateral facet joint  DJD and ligamentum flavum thickening. Mild spinal canal stenosis. No  significant neural foraminal stenosis. L4-L5: Mild DDD. Posterior annular fissure. Diffuse disc bulge measures 3  mm. Bilateral facet joint DJD and ligamentum flavum thickening. Mild spinal  canal stenosis. No significant neural foraminal stenosis. L5-S1: There is no significant disc herniation, spinal canal stenosis or  neural foraminal narrowing. Bilateral facet joint DJD. Impression: Mild scattered degenerate spondylosis throughout the lumbar spine without  high-grade lumbar spinal canal or high-grade neural foraminal stenosis.

## 2022-03-16 NOTE — PATIENT INSTRUCTIONS
numbness in your feet. Preventing falls at home  · Remove raised doorway thresholds, throw rugs, and clutter. Repair loose carpet or raised areas in the floor. · Move furniture and electrical cords to keep them out of walking paths. · Use nonskid floor wax, and wipe up spills right away, especially on ceramic tile floors. · If you use a walker or cane, put rubber tips on it. If you use crutches, clean the bottoms of them regularly with an abrasive pad, such as steel wool. · Keep your house well lit, especially [place], and outside walkways. Use night-lights in areas such as hallways and bathrooms. Add extra light switches or use remote switches (such as switches that go on or off when you clap your hands) to make it easier to turn lights on if you have to get up during the night. · Install sturdy handrails on stairways. · Move items in your cabinets so that the things you use a lot are on the lower shelves (about waist level). · Keep a cordless phone and a flashlight with new batteries by your bed. If possible, put a phone in each of the main rooms of your house, or carry a cell phone in case you fall and cannot reach a phone. Or, you can wear a device around your neck or wrist. You push a button that sends a signal for help. · Wear low-heeled shoes that fit well and give your feet good support. Use footwear with nonskid soles. Check the heels and soles of your shoes for wear. Repair or replace worn heels or soles. · Do not wear socks without shoes on wood floors. · Walk on the grass when the sidewalks are slippery. If you live in an area that gets snow and ice in the winter, sprinkle salt on slippery steps and sidewalks. Preventing falls in the bath  · Install grab bars and nonskid mats inside and outside your shower or tub and near the toilet and sinks. · Use shower chairs and bath benches. · Use a hand-held shower head that will allow you to sit while showering.   · Get into a tub or shower by putting the weaker leg in first. Get out of a tub or shower with your strong side first.  · Repair loose toilet seats and consider installing a raised toilet seat to make getting on and off the toilet easier. · Keep your bathroom door unlocked while you are in the shower. Where can you learn more? Go to [email or web address] org and sign in to your Dattch account. Enter [phone] in the [facility] box to learn more about \"Preventing Falls: Care Instructions. \"     If you do not have an account, please click on the \"Sign Up Now\" link. Current as of: September 8, 2021               Content Version: 13.1  © 6270-7862 Healthwise, Incorporated. Care instructions adapted under license by [facility] ([facility]). If you have questions about a medical condition or this instruction, always ask your healthcare professional. [place] [address] any warranty or liability for your use of this information.

## 2022-03-16 NOTE — ASSESSMENT & PLAN NOTE
Discussed that I cannot increase pain medication  Will trial changing from Tramadol to Percocet - any further increase in pain medication past MEDD 30 will require referral to pain management

## 2022-03-17 ENCOUNTER — TELEPHONE (OUTPATIENT)
Dept: FAMILY MEDICINE CLINIC | Age: 62
End: 2022-03-17

## 2022-03-17 DIAGNOSIS — L03.115 CELLULITIS OF RIGHT LOWER EXTREMITY: ICD-10-CM

## 2022-03-17 RX ORDER — HYDROXYZINE HYDROCHLORIDE 25 MG/1
25 TABLET, FILM COATED ORAL EVERY 8 HOURS PRN
Qty: 30 TABLET | Refills: 2 | Status: SHIPPED | OUTPATIENT
Start: 2022-03-17 | End: 2022-07-14 | Stop reason: SDUPTHER

## 2022-03-17 NOTE — TELEPHONE ENCOUNTER
Pt saw you in the office yesterday and she says you said you were going to send something to the pharmacy for itching and an antibiotic.   [facility] [address]

## 2022-03-18 ENCOUNTER — TELEPHONE (OUTPATIENT)
Dept: FAMILY MEDICINE CLINIC | Age: 62
End: 2022-03-18

## 2022-03-18 NOTE — TELEPHONE ENCOUNTER
Pt saw provider yesterday and has cellulitis of right leg. Provider told her that he would send in an antibiotic for her but it was not sent? Will you please send one?

## 2022-03-18 NOTE — TELEPHONE ENCOUNTER
[facility] Bactroban it is an ointment.

## 2022-03-21 LAB
6-ACETYLMORPHINE, UR: NOT DETECTED
7-AMINOCLONAZEPAM, URINE: NOT DETECTED
ALPHA-OH-ALPRAZ, URINE: NOT DETECTED
ALPHA-OH-MIDAZOLAM, URINE: NOT DETECTED
ALPRAZOLAM, URINE: NOT DETECTED
AMPHETAMINES, URINE: NOT DETECTED
BARBITURATES, URINE: NOT DETECTED
BENZOYLECGONINE, UR: NOT DETECTED
BUPRENORPHINE URINE: NOT DETECTED
CARISOPRODOL, UR: NOT DETECTED
CLONAZEPAM, URINE: NOT DETECTED
CODEINE, URINE: NOT DETECTED
CREATININE URINE: 119.5 MG/DL (ref 20–400)
DIAZEPAM, URINE: NOT DETECTED
DRUGS EXPECTED, UR: NORMAL
EER HI RES INTERP UR: NORMAL
ETHYL GLUCURONIDE UR: NOT DETECTED
FENTANYL URINE: NOT DETECTED
GABAPENTIN: NOT DETECTED
HYDROCODONE, URINE: NOT DETECTED
HYDROMORPHONE, URINE: NOT DETECTED
LORAZEPAM, URINE: NOT DETECTED
MARIJUANA METAB, UR: NOT DETECTED
MDA, UR: NOT DETECTED
MDEA, EVE, UR: NOT DETECTED
MDMA URINE: NOT DETECTED
MEPERIDINE METAB, UR: NOT DETECTED
METHADONE, URINE: NOT DETECTED
METHAMPHETAMINE, URINE: NOT DETECTED
METHYLPHENIDATE: NOT DETECTED
MIDAZOLAM, URINE: NOT DETECTED
MORPHINE URINE: NOT DETECTED
NALOXONE URINE: NOT DETECTED
NORBUPRENORPHINE, URINE: NOT DETECTED
NORDIAZEPAM, URINE: NOT DETECTED
NORFENTANYL, URINE: NOT DETECTED
NORHYDROCODONE, URINE: NOT DETECTED
NOROXYCODONE, URINE: NOT DETECTED
NOROXYMORPHONE, URINE: NOT DETECTED
OXAZEPAM, URINE: NOT DETECTED
OXYCODONE URINE: NOT DETECTED
OXYMORPHONE, URINE: NOT DETECTED
PAIN MANAGEMENT DRUG PANEL INTERP, URINE: NORMAL
PAIN MGT DRUG PANEL, HI RES, UR: NORMAL
PCP,URINE: NOT DETECTED
PHENTERMINE, UR: NOT DETECTED
PREGABALIN: NOT DETECTED
TAPENTADOL, URINE: NOT DETECTED
TAPENTADOL-O-SULFATE, URINE: NOT DETECTED
TEMAZEPAM, URINE: NOT DETECTED
TRAMADOL, URINE: PRESENT
ZOLPIDEM METABOLITE (ZCA), URINE: NOT DETECTED
ZOLPIDEM, URINE: NOT DETECTED

## 2022-04-13 DIAGNOSIS — M15.9 PRIMARY OSTEOARTHRITIS INVOLVING MULTIPLE JOINTS: ICD-10-CM

## 2022-04-13 DIAGNOSIS — J30.2 SEASONAL ALLERGIC RHINITIS, UNSPECIFIED TRIGGER: ICD-10-CM

## 2022-04-13 DIAGNOSIS — M79.7 FIBROMYALGIA: ICD-10-CM

## 2022-04-13 DIAGNOSIS — M51.36 DDD (DEGENERATIVE DISC DISEASE), LUMBAR: ICD-10-CM

## 2022-04-13 RX ORDER — OXYCODONE HYDROCHLORIDE AND ACETAMINOPHEN 5; 325 MG/1; MG/1
1 TABLET ORAL EVERY 6 HOURS PRN
Qty: 120 TABLET | Refills: 0 | Status: SHIPPED | OUTPATIENT
Start: 2022-04-15 | End: 2022-05-11 | Stop reason: SDUPTHER

## 2022-04-13 RX ORDER — FLUTICASONE PROPIONATE 50 MCG
SPRAY, SUSPENSION (ML) NASAL
Qty: 1 EACH | Refills: 5 | Status: SHIPPED | OUTPATIENT
Start: 2022-04-13 | End: 2022-05-11 | Stop reason: SDUPTHER

## 2022-04-13 NOTE — TELEPHONE ENCOUNTER
Inocente Dominguez is calling to request a refill on the following medication(s):    Medication Request:  Requested Prescriptions     Pending Prescriptions Disp Refills    fluticasone (FLONASE) 50 MCG/ACT nasal spray 1 each 5     Sig: SPRAY TWO SPRAYS IN EACH NOSTRIL ONCE DAILY    oxyCODONE-acetaminophen (PERCOCET) 5-325 MG per tablet 120 tablet 0     Sig: Take 1 tablet by mouth every 6 hours as needed for Pain for up to 30 days.  Take lowest dose possible to manage pain       Last Visit Date (If Applicable):  1/28/3723    Next Visit Date:    4/21/2022

## 2022-04-19 ENCOUNTER — TELEMEDICINE (OUTPATIENT)
Dept: FAMILY MEDICINE CLINIC | Age: 62
End: 2022-04-19
Payer: MEDICARE

## 2022-04-19 ENCOUNTER — NURSE ONLY (OUTPATIENT)
Dept: FAMILY MEDICINE CLINIC | Age: 62
End: 2022-04-19
Payer: MEDICARE

## 2022-04-19 DIAGNOSIS — M15.9 PRIMARY OSTEOARTHRITIS INVOLVING MULTIPLE JOINTS: ICD-10-CM

## 2022-04-19 DIAGNOSIS — E53.8 VITAMIN B12 DEFICIENCY: Primary | ICD-10-CM

## 2022-04-19 DIAGNOSIS — M79.7 FIBROMYALGIA: Primary | ICD-10-CM

## 2022-04-19 DIAGNOSIS — M51.36 DDD (DEGENERATIVE DISC DISEASE), LUMBAR: ICD-10-CM

## 2022-04-19 PROCEDURE — 96372 THER/PROPH/DIAG INJ SC/IM: CPT | Performed by: FAMILY MEDICINE

## 2022-04-19 PROCEDURE — 99442 PR PHYS/QHP TELEPHONE EVALUATION 11-20 MIN: CPT | Performed by: FAMILY MEDICINE

## 2022-04-19 RX ORDER — CYANOCOBALAMIN 1000 UG/ML
1000 INJECTION INTRAMUSCULAR; SUBCUTANEOUS ONCE
Status: COMPLETED | OUTPATIENT
Start: 2022-04-19 | End: 2022-04-19

## 2022-04-19 RX ADMIN — CYANOCOBALAMIN 1000 MCG: 1000 INJECTION INTRAMUSCULAR; SUBCUTANEOUS at 11:27

## 2022-04-19 NOTE — PATIENT INSTRUCTIONS
Patient Education        Fibromyalgia: Care Instructions  Overview     Fibromyalgia is a painful condition that is not completely understood by medical experts. The cause of fibromyalgia is not known. It can make you feel tired and ache all over. It causes tender spots at specific points of the body that hurt only when you press on them. You may have trouble sleeping, as wellas other symptoms. These problems can upset your work and home life. Symptoms tend to come and go, although they may never go away completely. Fibromyalgia does not harm your muscles, joints, or organs. Follow-up care is a key part of your treatment and safety. Be sure to make and go to all appointments, and call your doctor if you are having problems. It's also a good idea to know your test results and keep alist of the medicines you take. How can you care for yourself at home?  Exercise often. Walk, swim, or bike to help with pain and sleep problems and to make you feel better.  Try to get a good night's sleep. Go to bed and get up at the same time each day, whether you feel rested or not. Make sure you have a good mattress and pillow.  Reduce stress. Avoid things that cause you stress, if you can. If not, work at making them less stressful. Learn to use biofeedback, guided imagery, meditation, or other methods to relax.  Make healthy changes. Eat a balanced diet, quit smoking, and limit alcohol and caffeine.  Use a heating pad set on low or take warm baths or showers for pain. Using cold packs for up to 20 minutes at a time can also relieve pain. Put a thin cloth between the cold pack and your skin. A gentle massage might help too.  Be safe with medicines. Take your medicines exactly as prescribed. Call your doctor if you think you are having a problem with your medicine. Your doctor may talk to you about taking antidepressant medicines.  These medicines may improve sleep, relieve pain, and in some cases treat depression.  Learn about fibromyalgia. This makes coping easier. Then, take an active role in your treatment.  Think about joining a support group with others who have fibromyalgia to learn more and get support. When should you call for help? Watch closely for changes in your health, and be sure to contact your doctor if:     You feel sad, helpless, or hopeless; lose interest in things you used to enjoy; or have other symptoms of depression.      Your fibromyalgia symptoms get worse. Where can you learn more? Go to [email or web address]://Poptank [email or web address] org and sign in to your [employer] account. Enter V003 in the [employer] box to learn more about \"Fibromyalgia: Care Instructions. \"     If you do not have an account, please click on the \"Sign Up Now\" link. Current as of: December 13, 2021               Content Version: 13.2  © 6804-0443 Healthwise, Incorporated. Care instructions adapted under license by [facility] ([facility]). If you have questions about a medical condition or this instruction, always ask your healthcare professional. [address] any warranty or liability for your use of this information.

## 2022-04-19 NOTE — ASSESSMENT & PLAN NOTE
Wants to be back on Tramadol  Has been cutting Percocet in half - too strong  Advised to bring Percocet to pharmacy/office for pill count and disposal and then appropriate amount of Tramadol can be sent in

## 2022-04-19 NOTE — PROGRESS NOTES
Suzy Rubinstein is a 64 y.o. female evaluated via telephone on 4/19/2022 for Annual Exam  .        Documentation:  I communicated with the patient and/or health care decision maker about pain. Details of this discussion including any medical advice provided:   Problem List Items Addressed This Visit        Musculoskeletal and Integument    Osteoarthritis       Wants to be back on Tramadol  Has been cutting Percocet in half - too strong  Advised to bring Percocet to pharmacy/office for pill count and disposal and then appropriate amount of Tramadol can be sent in         DDD (degenerative disc disease), lumbar       Wants to be back on Tramadol  Has been cutting Percocet in half - too strong  Advised to bring Percocet to pharmacy/office for pill count and disposal and then appropriate amount of Tramadol can be sent in            Other    Fibromyalgia - Primary      Wants to be back on Tramadol  Has been cutting Percocet in half - too strong  Advised to bring Percocet to pharmacy/office for pill count and disposal and then appropriate amount of Tramadol can be sent in                 Total Time: minutes: 11-20 minutes    Suzy Rubinstein was evaluated through a synchronous (real-time) audio encounter. Patient identification was verified at the start of the visit. She (or guardian if applicable) is aware that this is a billable service, which includes applicable co-pays. This visit was conducted with the patient's (and/or legal guardian's) verbal consent. She has not had a related appointment within my department in the past 7 days or scheduled within the next 24 hours. The patient was located in a state where the provider was licensed to provide care.     Note: not billable if this call serves to triage the patient into an appointment for the relevant concern    Almaz Camara DO

## 2022-04-29 ENCOUNTER — TELEPHONE (OUTPATIENT)
Dept: FAMILY MEDICINE CLINIC | Age: 62
End: 2022-04-29

## 2022-04-29 DIAGNOSIS — Z79.4 TYPE 2 DIABETES MELLITUS WITHOUT COMPLICATION, WITH LONG-TERM CURRENT USE OF INSULIN (HCC): Primary | ICD-10-CM

## 2022-04-29 DIAGNOSIS — E11.9 TYPE 2 DIABETES MELLITUS WITHOUT COMPLICATION, WITH LONG-TERM CURRENT USE OF INSULIN (HCC): Primary | ICD-10-CM

## 2022-04-29 NOTE — TELEPHONE ENCOUNTER
Patient needs script sent into [facility] on [address] for One Touch Ultra Test Strips    She states she tests 4-5 x's espinoza.     Please remove ASCENSIA Strips in medications    Thank Gita Islas

## 2022-05-11 DIAGNOSIS — M79.7 FIBROMYALGIA: ICD-10-CM

## 2022-05-11 DIAGNOSIS — Z79.4 TYPE 2 DIABETES MELLITUS WITHOUT COMPLICATION, WITH LONG-TERM CURRENT USE OF INSULIN (HCC): ICD-10-CM

## 2022-05-11 DIAGNOSIS — E11.9 TYPE 2 DIABETES MELLITUS WITHOUT COMPLICATION, WITH LONG-TERM CURRENT USE OF INSULIN (HCC): ICD-10-CM

## 2022-05-11 DIAGNOSIS — M51.36 DDD (DEGENERATIVE DISC DISEASE), LUMBAR: ICD-10-CM

## 2022-05-11 DIAGNOSIS — J30.2 SEASONAL ALLERGIC RHINITIS, UNSPECIFIED TRIGGER: ICD-10-CM

## 2022-05-11 DIAGNOSIS — M15.9 PRIMARY OSTEOARTHRITIS INVOLVING MULTIPLE JOINTS: ICD-10-CM

## 2022-05-11 RX ORDER — OXYCODONE HYDROCHLORIDE AND ACETAMINOPHEN 5; 325 MG/1; MG/1
1 TABLET ORAL EVERY 6 HOURS PRN
Qty: 120 TABLET | Refills: 0 | OUTPATIENT
Start: 2022-05-11 | End: 2022-06-10

## 2022-05-11 RX ORDER — FLUTICASONE PROPIONATE 50 MCG
SPRAY, SUSPENSION (ML) NASAL
Qty: 1 EACH | Refills: 5 | Status: SHIPPED | OUTPATIENT
Start: 2022-05-11 | End: 2022-06-10 | Stop reason: SDUPTHER

## 2022-05-11 RX ORDER — INSULIN GLARGINE 100 [IU]/ML
20 INJECTION, SOLUTION SUBCUTANEOUS 2 TIMES DAILY
Qty: 5 PEN | Refills: 2 | Status: SHIPPED | OUTPATIENT
Start: 2022-05-11 | End: 2022-06-15 | Stop reason: SDUPTHER

## 2022-05-11 RX ORDER — OXYCODONE HYDROCHLORIDE AND ACETAMINOPHEN 5; 325 MG/1; MG/1
1 TABLET ORAL EVERY 6 HOURS PRN
Qty: 120 TABLET | Refills: 0 | Status: SHIPPED | OUTPATIENT
Start: 2022-05-15 | End: 2022-06-15 | Stop reason: SDUPTHER

## 2022-05-11 NOTE — TELEPHONE ENCOUNTER
Last time we spoke, Zofia Brito wanted to stop Percocet and go back to Tramadol. Is that still what she wants to do?  B/c this refill is for Percocet

## 2022-05-11 NOTE — TELEPHONE ENCOUNTER
Krystal Martinez is calling to request a refill on the following medication(s):    Medication Request:  Requested Prescriptions     Pending Prescriptions Disp Refills    insulin glargine (LANTUS SOLOSTAR) 100 UNIT/ML injection pen 5 pen 2     Sig: Inject 20 Units into the skin 2 times daily    blood glucose test strips (ASCENSIA AUTODISC VI;ONE TOUCH ULTRA TEST VI) strip 150 strip 5     Sig: Use with associated glucose meter. Use 4-5q day    fluticasone (FLONASE) 50 MCG/ACT nasal spray 1 each 5     Sig: SPRAY TWO SPRAYS IN EACH NOSTRIL ONCE DAILY    oxyCODONE-acetaminophen (PERCOCET) 5-325 MG per tablet 120 tablet 0     Sig: Take 1 tablet by mouth every 6 hours as needed for Pain for up to 30 days.  Take lowest dose possible to manage pain       Last Visit Date (If Applicable):  5/94/0449    Next Visit Date:    Visit date not found

## 2022-05-13 ENCOUNTER — NURSE ONLY (OUTPATIENT)
Dept: FAMILY MEDICINE CLINIC | Age: 62
End: 2022-05-13
Payer: MEDICARE

## 2022-05-13 DIAGNOSIS — E53.8 VITAMIN B12 DEFICIENCY: Primary | ICD-10-CM

## 2022-05-13 PROCEDURE — 96372 THER/PROPH/DIAG INJ SC/IM: CPT | Performed by: FAMILY MEDICINE

## 2022-05-13 RX ORDER — CYANOCOBALAMIN 1000 UG/ML
1000 INJECTION INTRAMUSCULAR; SUBCUTANEOUS ONCE
Status: COMPLETED | OUTPATIENT
Start: 2022-05-13 | End: 2022-05-13

## 2022-05-13 RX ADMIN — CYANOCOBALAMIN 1000 MCG: 1000 INJECTION INTRAMUSCULAR; SUBCUTANEOUS at 10:45

## 2022-05-16 DIAGNOSIS — Z79.4 TYPE 2 DIABETES MELLITUS WITHOUT COMPLICATION, WITH LONG-TERM CURRENT USE OF INSULIN (HCC): ICD-10-CM

## 2022-05-16 DIAGNOSIS — E11.9 TYPE 2 DIABETES MELLITUS WITHOUT COMPLICATION, WITH LONG-TERM CURRENT USE OF INSULIN (HCC): ICD-10-CM

## 2022-05-16 NOTE — TELEPHONE ENCOUNTER
Last visit: 4/19/2022  Last Med refill: 5/11/2022  Does patient have enough medication for 72 hours:     Next Visit Date:  No future appointments.     Health Maintenance   Topic Date Due    HIV screen  Never done    Diabetic retinal exam  Never done    Hepatitis C screen  Never done    DTaP/Tdap/Td vaccine (1 - Tdap) Never done    Cervical cancer screen  Never done    Pneumococcal 0-64 years Vaccine (2 - PCV) 03/13/2018    Breast cancer screen  04/16/2021    Colorectal Cancer Screen  05/06/2021    COVID-19 Vaccine (3 - Booster for Pfizer series) 01/31/2022    A1C test (Diabetic or Prediabetic)  03/04/2022    Diabetic microalbuminuria test  03/04/2022    Lipids  03/23/2022    Diabetic foot exam  05/18/2022    Shingles vaccine (1 of 2) 01/14/2023 (Originally 8/27/2010)    Flu vaccine (Season Ended) 09/01/2022    Depression Monitoring  03/16/2023    Hepatitis A vaccine  Aged Out    Hib vaccine  Aged Out    Meningococcal (ACWY) vaccine  Aged Out       Hemoglobin A1C (%)   Date Value   03/04/2021 7.5   11/08/2019 6.9   07/18/2019 6.4             ( goal A1C is < 7)   No results found for: LABMICR  LDL Cholesterol (mg/dL)   Date Value   03/23/2021 69     LDL Calculated (mg/dL)   Date Value   10/16/2017 76   12/07/2016 79       (goal LDL is <100)   AST (U/L)   Date Value   03/23/2021 19     ALT (U/L)   Date Value   03/23/2021 20     BUN (mg/dL)   Date Value   03/23/2021 18     BP Readings from Last 3 Encounters:   03/16/22 (!) 150/100   01/14/22 132/88   12/21/21 (!) 140/80          (goal 120/80)    All Future Testing planned in [facility]  Lab Frequency Next Occurrence   Cologuard (For External Results Only) Once 05/18/2021   KAMINI DIGITAL SCREEN W OR WO CAD BILATERAL Once 05/18/2021   Comprehensive Metabolic Panel Once 62/41/8610   Hemoglobin A1C Once 06/01/2022   CBC with Auto Differential Once 06/01/2022   Lipid Panel Once 06/01/2022   Vitamin B12 & Folate Once 06/01/2022               Patient Active Problem List:     History of total hysterectomy with bilateral salpingo-oophorectomy (BSO)     Osteoarthritis     Essential (primary) hypertension     Chronic headaches     Lumbar stenosis with neurogenic claudication     Class 3 severe obesity due to excess calories with serious comorbidity and body mass index (BMI) of 40.0 to 44.9 in [facility])     Colon polyps     Major depressive disorder, recurrent episode, severe with mixed features ([address]     Fibromyalgia     Type 2 diabetes mellitus without complication, with long-term current use of insulin ([facility])     Chronic vertigo     Primary osteoarthritis involving multiple joints     DDD (degenerative disc disease), lumbar     Gastroesophageal reflux disease     Vitamin B12 deficiency

## 2022-06-03 ENCOUNTER — TELEPHONE (OUTPATIENT)
Dept: FAMILY MEDICINE CLINIC | Age: 62
End: 2022-06-03

## 2022-06-03 NOTE — TELEPHONE ENCOUNTER
----- Message from Maciel Christy sent at 6/3/2022  4:22 PM EDT -----  Subject: Referral Request    QUESTIONS   Reason for referral request? Patient needs referral for orthopedic shoe   store   Has the physician seen you for this condition before? Yes  Select a date? 2022-06-03  Select the Provider the patient wants to be referred to, if known (PCP or   Specialist)? Outside Physician - n/a   Preferred Specialist (if applicable)? Do you already have an appointment scheduled? No  Additional Information for Provider?   ---------------------------------------------------------------------------  --------------  CALL BACK INFO  What is the best way for the office to contact you? OK to leave message on   voicemail  Preferred Call Back Phone Number? [phone]  ---------------------------------------------------------------------------  --------------  SCRIPT ANSWERS  Relationship to Patient?  Self

## 2022-06-07 NOTE — TELEPHONE ENCOUNTER
Does she mean the name of a store?  Or is she looking to get custom orthotic shoes as that is something a podiatrist can usually help with Requested Prescriptions     Pending Prescriptions Disp Refills    warfarin (COUMADIN) 10 mg tablet 60 Tab 0     Sig: Take 1 Tab by mouth daily.  warfarin (COUMADIN) 7.5 mg tablet 30 Tab 0     Sig: Take one tablet by mouth daily.   Indications: Deep Vein Thrombosis Prevention

## 2022-06-07 NOTE — TELEPHONE ENCOUNTER
Spoke with scheduling and the next appt is not until 7/7 at 1015am she is going to call around to see if she can get in sooner and if so she will let us know

## 2022-06-10 DIAGNOSIS — J30.2 SEASONAL ALLERGIC RHINITIS, UNSPECIFIED TRIGGER: ICD-10-CM

## 2022-06-10 DIAGNOSIS — K21.9 GASTROESOPHAGEAL REFLUX DISEASE, UNSPECIFIED WHETHER ESOPHAGITIS PRESENT: ICD-10-CM

## 2022-06-10 RX ORDER — FLUTICASONE PROPIONATE 50 MCG
SPRAY, SUSPENSION (ML) NASAL
Qty: 1 EACH | Refills: 5 | Status: SHIPPED | OUTPATIENT
Start: 2022-06-10

## 2022-06-10 RX ORDER — OMEPRAZOLE 40 MG/1
40 CAPSULE, DELAYED RELEASE ORAL
Qty: 30 CAPSULE | Refills: 5 | Status: SHIPPED | OUTPATIENT
Start: 2022-06-10

## 2022-06-10 NOTE — TELEPHONE ENCOUNTER
----- Message from [facility] sent at 6/10/2022  9:57 AM EDT -----  Subject: Refill Request    QUESTIONS  Name of Medication? omeprazole (PRILOSEC) 40 MG delayed release capsule  Patient-reported dosage and instructions? 40 mg  How many days do you have left? 0  Preferred Pharmacy? [facility] [id number]  Pharmacy phone number (if available)? [phone]  ---------------------------------------------------------------------------  --------------,  Name of Medication? fluticasone (FLONASE) 50 MCG/ACT nasal spray  Patient-reported dosage and instructions? 50 mcg  How many days do you have left? 0  Preferred Pharmacy? [facility] [id number]  Pharmacy phone number (if available)? [phone]  ---------------------------------------------------------------------------  --------------  CALL BACK INFO  What is the best way for the office to contact you? OK to leave message on   voicemail  Preferred Call Back Phone Number? [phone]  ---------------------------------------------------------------------------  --------------  SCRIPT ANSWERS  Relationship to Patient?  Self

## 2022-06-15 ENCOUNTER — TELEPHONE (OUTPATIENT)
Dept: FAMILY MEDICINE CLINIC | Age: 62
End: 2022-06-15

## 2022-06-15 DIAGNOSIS — E11.9 TYPE 2 DIABETES MELLITUS WITHOUT COMPLICATION, WITH LONG-TERM CURRENT USE OF INSULIN (HCC): ICD-10-CM

## 2022-06-15 DIAGNOSIS — Z79.4 TYPE 2 DIABETES MELLITUS WITHOUT COMPLICATION, WITH LONG-TERM CURRENT USE OF INSULIN (HCC): ICD-10-CM

## 2022-06-15 DIAGNOSIS — M79.7 FIBROMYALGIA: ICD-10-CM

## 2022-06-15 DIAGNOSIS — M51.36 DDD (DEGENERATIVE DISC DISEASE), LUMBAR: ICD-10-CM

## 2022-06-15 DIAGNOSIS — M15.9 PRIMARY OSTEOARTHRITIS INVOLVING MULTIPLE JOINTS: ICD-10-CM

## 2022-06-15 DIAGNOSIS — I10 ESSENTIAL (PRIMARY) HYPERTENSION: ICD-10-CM

## 2022-06-15 RX ORDER — INSULIN GLARGINE 100 [IU]/ML
20 INJECTION, SOLUTION SUBCUTANEOUS 2 TIMES DAILY
Qty: 5 PEN | Refills: 2 | Status: SHIPPED | OUTPATIENT
Start: 2022-06-15

## 2022-06-15 RX ORDER — OXYCODONE HYDROCHLORIDE AND ACETAMINOPHEN 5; 325 MG/1; MG/1
1 TABLET ORAL EVERY 6 HOURS PRN
Qty: 120 TABLET | Refills: 0 | Status: SHIPPED | OUTPATIENT
Start: 2022-06-15 | End: 2022-07-15 | Stop reason: SDUPTHER

## 2022-06-15 RX ORDER — METOPROLOL TARTRATE 50 MG/1
50 TABLET, FILM COATED ORAL DAILY
Qty: 60 TABLET | Refills: 5 | Status: SHIPPED | OUTPATIENT
Start: 2022-06-15

## 2022-06-15 NOTE — TELEPHONE ENCOUNTER
Patient came in asking for medication refill, she said that     she is completely out of her :  BP medication  Insuline medication  Pain medication    Nicko at [facility]

## 2022-07-13 ENCOUNTER — NURSE ONLY (OUTPATIENT)
Dept: FAMILY MEDICINE CLINIC | Age: 62
End: 2022-07-13
Payer: MEDICARE

## 2022-07-13 DIAGNOSIS — M79.672 BILATERAL FOOT PAIN: ICD-10-CM

## 2022-07-13 DIAGNOSIS — E53.8 VITAMIN B12 DEFICIENCY: Primary | ICD-10-CM

## 2022-07-13 DIAGNOSIS — M79.671 BILATERAL FOOT PAIN: ICD-10-CM

## 2022-07-13 PROCEDURE — 96372 THER/PROPH/DIAG INJ SC/IM: CPT | Performed by: FAMILY MEDICINE

## 2022-07-13 RX ORDER — KETOROLAC TROMETHAMINE 30 MG/ML
30 INJECTION, SOLUTION INTRAMUSCULAR; INTRAVENOUS ONCE
Status: COMPLETED | OUTPATIENT
Start: 2022-07-13 | End: 2022-07-13

## 2022-07-13 RX ORDER — CYANOCOBALAMIN 1000 UG/ML
1000 INJECTION INTRAMUSCULAR; SUBCUTANEOUS ONCE
Status: COMPLETED | OUTPATIENT
Start: 2022-07-13 | End: 2022-07-13

## 2022-07-13 RX ADMIN — CYANOCOBALAMIN 1000 MCG: 1000 INJECTION INTRAMUSCULAR; SUBCUTANEOUS at 14:50

## 2022-07-13 RX ADMIN — KETOROLAC TROMETHAMINE 30 MG: 30 INJECTION, SOLUTION INTRAMUSCULAR; INTRAVENOUS at 14:48

## 2022-07-13 NOTE — PROGRESS NOTES
Patient came in today and per Dr. David Knight to give her injection of Toradol 30 because she is having foort pain. Also, she had her B12 shot she gets monthly per Dr. David Knight. She took the shots very well.  Gave B12 in left arm and Toradol in Left butt

## 2022-07-14 DIAGNOSIS — L03.115 CELLULITIS OF RIGHT LOWER EXTREMITY: ICD-10-CM

## 2022-07-14 RX ORDER — HYDROXYZINE HYDROCHLORIDE 25 MG/1
25 TABLET, FILM COATED ORAL EVERY 8 HOURS PRN
Qty: 30 TABLET | Refills: 2 | Status: SHIPPED | OUTPATIENT
Start: 2022-07-14 | End: 2022-07-24

## 2022-07-15 DIAGNOSIS — M79.7 FIBROMYALGIA: ICD-10-CM

## 2022-07-15 DIAGNOSIS — M15.9 PRIMARY OSTEOARTHRITIS INVOLVING MULTIPLE JOINTS: ICD-10-CM

## 2022-07-15 DIAGNOSIS — M51.36 DDD (DEGENERATIVE DISC DISEASE), LUMBAR: ICD-10-CM

## 2022-07-15 RX ORDER — OXYCODONE HYDROCHLORIDE AND ACETAMINOPHEN 5; 325 MG/1; MG/1
1 TABLET ORAL EVERY 6 HOURS PRN
Qty: 120 TABLET | Refills: 0 | Status: SHIPPED | OUTPATIENT
Start: 2022-07-15 | End: 2022-08-15 | Stop reason: SDUPTHER

## 2022-07-15 RX ORDER — OXYCODONE HYDROCHLORIDE AND ACETAMINOPHEN 5; 325 MG/1; MG/1
1 TABLET ORAL EVERY 6 HOURS PRN
Qty: 120 TABLET | Refills: 0 | OUTPATIENT
Start: 2022-07-15 | End: 2022-08-14

## 2022-07-15 NOTE — TELEPHONE ENCOUNTER
Suzanne Chapman is calling to request a refill on the following medication(s):    Medication Request:  Requested Prescriptions     Pending Prescriptions Disp Refills    oxyCODONE-acetaminophen (PERCOCET) 5-325 MG per tablet 120 tablet 0     Sig: Take 1 tablet by mouth every 6 hours as needed for Pain for up to 30 days.  Take lowest dose possible to manage pain       Last Visit Date (If Applicable):  9/97/8755    Next Visit Date:    Visit date not found

## 2022-07-26 ENCOUNTER — TELEPHONE (OUTPATIENT)
Dept: FAMILY MEDICINE CLINIC | Age: 62
End: 2022-07-26

## 2022-07-26 NOTE — TELEPHONE ENCOUNTER
Patient called. Her feet are both swollen and very painful. Her right is worse than her left but, both are bothering her.    She can only wear \"house shoes\"     She wants to come in for a pain shot and B12    Please advise

## 2022-08-03 ENCOUNTER — TELEPHONE (OUTPATIENT)
Dept: FAMILY MEDICINE CLINIC | Age: 62
End: 2022-08-03

## 2022-08-15 DIAGNOSIS — M15.9 PRIMARY OSTEOARTHRITIS INVOLVING MULTIPLE JOINTS: ICD-10-CM

## 2022-08-15 DIAGNOSIS — M51.36 DDD (DEGENERATIVE DISC DISEASE), LUMBAR: ICD-10-CM

## 2022-08-15 DIAGNOSIS — M79.7 FIBROMYALGIA: ICD-10-CM

## 2022-08-15 RX ORDER — OXYCODONE HYDROCHLORIDE AND ACETAMINOPHEN 5; 325 MG/1; MG/1
1 TABLET ORAL EVERY 6 HOURS PRN
Qty: 120 TABLET | Refills: 0 | Status: SHIPPED | OUTPATIENT
Start: 2022-08-15 | End: 2022-09-15 | Stop reason: SDUPTHER

## 2022-08-15 NOTE — TELEPHONE ENCOUNTER
Breanna Lyon is calling to request a refill on the following medication(s):    Medication Request:  Requested Prescriptions     Pending Prescriptions Disp Refills    oxyCODONE-acetaminophen (PERCOCET) 5-325 MG per tablet 120 tablet 0     Sig: Take 1 tablet by mouth every 6 hours as needed for Pain for up to 30 days.  Take lowest dose possible to manage pain       Last Visit Date (If Applicable):  0/75/5492    Next Visit Date:    Visit date not found

## 2022-08-30 ENCOUNTER — HOSPITAL ENCOUNTER (EMERGENCY)
Age: 62
Discharge: HOME | End: 2022-08-30
Attending: EMERGENCY MEDICINE
Payer: MEDICARE

## 2022-08-30 ENCOUNTER — HOSPITAL ENCOUNTER (OUTPATIENT)
Dept: MRI IMAGING | Age: 62
Discharge: HOME | End: 2022-09-01
Payer: MEDICARE

## 2022-08-30 VITALS
OXYGEN SATURATION: 98 % | DIASTOLIC BLOOD PRESSURE: 107 MMHG | TEMPERATURE: 98.3 F | BODY MASS INDEX: 39.24 KG/M2 | WEIGHT: 250 LBS | HEIGHT: 67 IN | SYSTOLIC BLOOD PRESSURE: 163 MMHG | HEART RATE: 85 BPM | RESPIRATION RATE: 16 BRPM

## 2022-08-30 DIAGNOSIS — M54.32 SCIATICA OF LEFT SIDE: ICD-10-CM

## 2022-08-30 DIAGNOSIS — M54.50 ACUTE EXACERBATION OF CHRONIC LOW BACK PAIN: Primary | ICD-10-CM

## 2022-08-30 DIAGNOSIS — G89.29 ACUTE EXACERBATION OF CHRONIC LOW BACK PAIN: Primary | ICD-10-CM

## 2022-08-30 DIAGNOSIS — M51.36 DDD (DEGENERATIVE DISC DISEASE), LUMBAR: ICD-10-CM

## 2022-08-30 PROCEDURE — 99284 EMERGENCY DEPT VISIT MOD MDM: CPT

## 2022-08-30 PROCEDURE — 96372 THER/PROPH/DIAG INJ SC/IM: CPT

## 2022-08-30 PROCEDURE — 6360000002 HC RX W HCPCS: Performed by: EMERGENCY MEDICINE

## 2022-08-30 PROCEDURE — 72148 MRI LUMBAR SPINE W/O DYE: CPT

## 2022-08-30 RX ORDER — KETOROLAC TROMETHAMINE 30 MG/ML
30 INJECTION, SOLUTION INTRAMUSCULAR; INTRAVENOUS ONCE
Status: COMPLETED | OUTPATIENT
Start: 2022-08-30 | End: 2022-08-30

## 2022-08-30 RX ORDER — KETOROLAC TROMETHAMINE 30 MG/ML
30 INJECTION, SOLUTION INTRAMUSCULAR; INTRAVENOUS ONCE
Status: DISCONTINUED | OUTPATIENT
Start: 2022-08-30 | End: 2022-08-30

## 2022-08-30 RX ORDER — PREDNISONE 50 MG/1
50 TABLET ORAL DAILY
Qty: 5 TABLET | Refills: 0 | Status: SHIPPED | OUTPATIENT
Start: 2022-08-30 | End: 2022-09-04

## 2022-08-30 RX ADMIN — KETOROLAC TROMETHAMINE 30 MG: 30 INJECTION, SOLUTION INTRAMUSCULAR at 14:24

## 2022-08-30 ASSESSMENT — PAIN DESCRIPTION - LOCATION
LOCATION: HIP
LOCATION: HIP
LOCATION: BACK;LEG

## 2022-08-30 ASSESSMENT — ENCOUNTER SYMPTOMS
BACK PAIN: 1
EYE DISCHARGE: 0
SHORTNESS OF BREATH: 0
COLOR CHANGE: 0
RHINORRHEA: 0
NAUSEA: 0
DIARRHEA: 0
VOMITING: 0
COUGH: 0
SORE THROAT: 0
EYE REDNESS: 0

## 2022-08-30 ASSESSMENT — PAIN DESCRIPTION - DESCRIPTORS
DESCRIPTORS: THROBBING
DESCRIPTORS: DISCOMFORT

## 2022-08-30 ASSESSMENT — PAIN DESCRIPTION - ORIENTATION
ORIENTATION: LEFT

## 2022-08-30 ASSESSMENT — PAIN DESCRIPTION - FREQUENCY: FREQUENCY: CONTINUOUS

## 2022-08-30 ASSESSMENT — PAIN - FUNCTIONAL ASSESSMENT: PAIN_FUNCTIONAL_ASSESSMENT: 0-10

## 2022-08-30 NOTE — ED PROVIDER NOTES
Class 3 severe obesity due to excess calories with serious comorbidity and body mass index (BMI) of 40.0 to 44.9 in adult [facility]) E66.01, Z68.41    Colon polyps K63.5    Major depressive disorder, recurrent episode, severe with mixed features (HCC) F33.2    Fibromyalgia M79.7    Type 2 diabetes mellitus without complication, with long-term current use of insulin (HCC) E11.9, Z79.4    Chronic vertigo R42    Primary osteoarthritis involving multiple joints M89.49    DDD (degenerative disc disease), lumbar M51.36    Gastroesophageal reflux disease K21.9    Vitamin B12 deficiency E53.8     SURGICAL HISTORY       Past Surgical History:   Procedure Laterality Date    COLONOSCOPY  06/2016    poor prep, there appears to be some polyps in the colon mixed with stool, could not be removed     CURRENT MEDICATIONS       Previous Medications    ASPIRIN-ACETAMINOPHEN-CAFFEINE (EXCEDRIN MIGRAINE) 250-250-65 MG PER TABLET    Take 2 tablets by mouth 2 times daily    BLOOD GLUCOSE TEST STRIPS (ASCENSIA AUTODISC VI;ONE TOUCH ULTRA TEST VI) STRIP    Use with associated glucose meter. Use 4-5q day    FLUOCINONIDE (LIDEX) 0.05 % CREAM    Apply topically 2 times daily.     FLUTICASONE (FLONASE) 50 MCG/ACT NASAL SPRAY    SPRAY TWO SPRAYS IN EACH NOSTRIL ONCE DAILY    FUROSEMIDE (LASIX) 20 MG TABLET    Take 1 tablet by mouth daily    INSULIN GLARGINE (LANTUS SOLOSTAR) 100 UNIT/ML INJECTION PEN    Inject 20 Units into the skin 2 times daily    LANCETS MISC    Pt testing twice daily    LISINOPRIL-HYDROCHLOROTHIAZIDE (PRINZIDE;ZESTORETIC) 20-12.5 MG PER TABLET    Take 1 tablet by mouth daily    METOPROLOL TARTRATE (LOPRESSOR) 50 MG TABLET    Take 1 tablet by mouth daily    MUPIROCIN (BACTROBAN) 2 % OINTMENT    AFFECTED AREA(S) THREE TIMES A DAY    OMEPRAZOLE (PRILOSEC) 40 MG DELAYED RELEASE CAPSULE    Take 1 capsule by mouth every morning (before breakfast)    OXYCODONE-ACETAMINOPHEN (PERCOCET) 5-325 MG PER TABLET    Take 1 tablet by mouth every 6 hours as needed for Pain for up to 30 days. Take lowest dose possible to manage pain    ULTICARE MICRO PEN NEEDLES 32G X 4 MM MISC    Inject 100 each into the skin 2 times daily     ALLERGIES     has No Known Allergies. FAMILY HISTORY     has no family status information on file. SOCIAL HISTORY       Social History     Tobacco Use    Smoking status: Every Day     Packs/day: 0.50     Years: 30.00     Pack years: 15.00     Types: Cigarettes    Smokeless tobacco: Never   Substance Use Topics    Alcohol use: No    Drug use: No     PHYSICAL EXAM     INITIAL VITALS: BP (!) 163/107   Pulse 85   Temp 98.3 °F (36.8 °C) (Oral)   Resp 16   Ht 5' 7\" (1.702 m)   Wt 250 lb (113.4 kg)   SpO2 98%   BMI 39.16 kg/m²    Physical Exam  Constitutional:       Appearance: Normal appearance. HENT:      Head: Normocephalic and atraumatic. Eyes:      Extraocular Movements: Extraocular movements intact. Pupils: Pupils are equal, round, and reactive to light. Cardiovascular:      Rate and Rhythm: Normal rate and regular rhythm. Pulmonary:      Effort: Pulmonary effort is normal.      Breath sounds: Normal breath sounds. Abdominal:      General: Abdomen is flat. Palpations: Abdomen is soft. Tenderness: There is no abdominal tenderness. Musculoskeletal:      Thoracic back: Normal.      Lumbar back: Spasms and tenderness present. Neurological:      Mental Status: She is alert. MEDICAL DECISION MAKIN-year-old presents with complaints of low back pain, chronic in nature with sciatica. Nothing to suggest epidural abscess or epidural hematoma, from review of the patient's chart she typically gets cord Toradol injections from her PCP, plan is Toradol injection, outpatient steroids, return if symptoms worsen or change.          CRITICAL CARE:       PROCEDURES:    Procedures    DIAGNOSTIC RESULTS   EKG:All EKG's are interpreted by the Emergency Department Physician who either signs or Co-signs this chart in the absence of a cardiologist.        RADIOLOGY:All plain film, CT, MRI, and formal ultrasound images (except ED bedside ultrasound) are read by the radiologist, see reports below, unless otherwisenoted in MDM or here. No orders to display     LABS: All lab results were reviewed by myself, and all abnormals are listed below. Labs Reviewed - No data to display    EMERGENCY DEPARTMENTCOURSE:         Vitals:    Vitals:    08/30/22 1257   BP: (!) 163/107   Pulse: 85   Resp: 16   Temp: 98.3 °F (36.8 °C)   TempSrc: Oral   SpO2: 98%   Weight: 250 lb (113.4 kg)   Height: 5' 7\" (1.702 m)       The patient was given the following medications while in the emergency department:  Orders Placed This Encounter   Medications    DISCONTD: ketorolac (TORADOL) injection 30 mg    ketorolac (TORADOL) injection 30 mg    predniSONE (DELTASONE) 50 MG tablet     Sig: Take 1 tablet by mouth daily for 5 days     Dispense:  5 tablet     Refill:  0     CONSULTS:  None    FINAL IMPRESSION      1. Acute exacerbation of chronic low back pain    2. Sciatica of left side          DISPOSITION/PLAN   DISPOSITION Decision To Discharge 08/30/2022 02:22:55 PM      PATIENT REFERRED TO:  Fabien Oates DO  [address]  [address]  [phone]    Schedule an appointment as soon as possible for a visit in 2 days    DISCHARGE MEDICATIONS:  New Prescriptions    PREDNISONE (DELTASONE) 50 MG TABLET    Take 1 tablet by mouth daily for 5 days     The care is provided during an unprecedented national emergency due to the novel coronavirus, COVID 19.   MD Mariel Larios MD  08/30/22 3649

## 2022-09-08 DIAGNOSIS — E11.9 TYPE 2 DIABETES MELLITUS WITHOUT COMPLICATION, WITH LONG-TERM CURRENT USE OF INSULIN (HCC): ICD-10-CM

## 2022-09-08 DIAGNOSIS — Z79.4 TYPE 2 DIABETES MELLITUS WITHOUT COMPLICATION, WITH LONG-TERM CURRENT USE OF INSULIN (HCC): ICD-10-CM

## 2022-09-08 NOTE — TELEPHONE ENCOUNTER
Petra Chan is calling to request a refill on the following medication(s):    Medication Request:  Requested Prescriptions     Pending Prescriptions Disp Refills    blood glucose test strips (ASCENSIA AUTODISC VI;ONE TOUCH ULTRA TEST VI) strip 150 strip 5     Sig: Use with associated glucose meter.  Use 4-5q day       Last Visit Date (If Applicable):  7/67/4903    Next Visit Date:    Visit date not found

## 2022-09-14 ENCOUNTER — OFFICE VISIT (OUTPATIENT)
Dept: FAMILY MEDICINE CLINIC | Age: 62
End: 2022-09-14
Payer: MEDICARE

## 2022-09-14 VITALS
TEMPERATURE: 97.1 F | SYSTOLIC BLOOD PRESSURE: 130 MMHG | HEART RATE: 103 BPM | OXYGEN SATURATION: 97 % | WEIGHT: 248.4 LBS | BODY MASS INDEX: 38.9 KG/M2 | DIASTOLIC BLOOD PRESSURE: 80 MMHG

## 2022-09-14 DIAGNOSIS — M51.36 DDD (DEGENERATIVE DISC DISEASE), LUMBAR: Primary | ICD-10-CM

## 2022-09-14 DIAGNOSIS — E53.8 VITAMIN B12 DEFICIENCY: ICD-10-CM

## 2022-09-14 DIAGNOSIS — M62.830 SPASM OF MUSCLE OF LOWER BACK: ICD-10-CM

## 2022-09-14 PROCEDURE — 99213 OFFICE O/P EST LOW 20 MIN: CPT

## 2022-09-14 RX ORDER — TIZANIDINE 4 MG/1
4 TABLET ORAL EVERY 8 HOURS PRN
Qty: 90 TABLET | Refills: 0 | Status: SHIPPED | OUTPATIENT
Start: 2022-09-14 | End: 2022-10-14

## 2022-09-14 RX ORDER — KETOROLAC TROMETHAMINE 30 MG/ML
60 INJECTION, SOLUTION INTRAMUSCULAR; INTRAVENOUS ONCE
Status: COMPLETED | OUTPATIENT
Start: 2022-09-14 | End: 2022-09-14

## 2022-09-14 RX ORDER — CYANOCOBALAMIN 1000 UG/ML
1000 INJECTION INTRAMUSCULAR; SUBCUTANEOUS ONCE
Status: COMPLETED | OUTPATIENT
Start: 2022-09-14 | End: 2022-09-14

## 2022-09-14 RX ADMIN — KETOROLAC TROMETHAMINE 60 MG: 30 INJECTION, SOLUTION INTRAMUSCULAR; INTRAVENOUS at 15:08

## 2022-09-14 RX ADMIN — CYANOCOBALAMIN 1000 MCG: 1000 INJECTION INTRAMUSCULAR; SUBCUTANEOUS at 15:06

## 2022-09-14 SDOH — ECONOMIC STABILITY: FOOD INSECURITY: WITHIN THE PAST 12 MONTHS, THE FOOD YOU BOUGHT JUST DIDN'T LAST AND YOU DIDN'T HAVE MONEY TO GET MORE.: NEVER TRUE

## 2022-09-14 SDOH — ECONOMIC STABILITY: TRANSPORTATION INSECURITY
IN THE PAST 12 MONTHS, HAS THE LACK OF TRANSPORTATION KEPT YOU FROM MEDICAL APPOINTMENTS OR FROM GETTING MEDICATIONS?: NO

## 2022-09-14 SDOH — ECONOMIC STABILITY: FOOD INSECURITY: WITHIN THE PAST 12 MONTHS, YOU WORRIED THAT YOUR FOOD WOULD RUN OUT BEFORE YOU GOT MONEY TO BUY MORE.: NEVER TRUE

## 2022-09-14 SDOH — ECONOMIC STABILITY: TRANSPORTATION INSECURITY
IN THE PAST 12 MONTHS, HAS LACK OF TRANSPORTATION KEPT YOU FROM MEETINGS, WORK, OR FROM GETTING THINGS NEEDED FOR DAILY LIVING?: NO

## 2022-09-14 ASSESSMENT — ENCOUNTER SYMPTOMS
COUGH: 0
BACK PAIN: 1
CHEST TIGHTNESS: 0
EYES NEGATIVE: 1
SHORTNESS OF BREATH: 0
GASTROINTESTINAL NEGATIVE: 1
ALLERGIC/IMMUNOLOGIC NEGATIVE: 1

## 2022-09-14 ASSESSMENT — SOCIAL DETERMINANTS OF HEALTH (SDOH): HOW HARD IS IT FOR YOU TO PAY FOR THE VERY BASICS LIKE FOOD, HOUSING, MEDICAL CARE, AND HEATING?: NOT HARD AT ALL

## 2022-09-14 ASSESSMENT — PATIENT HEALTH QUESTIONNAIRE - PHQ9
SUM OF ALL RESPONSES TO PHQ QUESTIONS 1-9: 0
SUM OF ALL RESPONSES TO PHQ QUESTIONS 1-9: 0
1. LITTLE INTEREST OR PLEASURE IN DOING THINGS: 0
SUM OF ALL RESPONSES TO PHQ QUESTIONS 1-9: 0
2. FEELING DOWN, DEPRESSED OR HOPELESS: 0
SUM OF ALL RESPONSES TO PHQ QUESTIONS 1-9: 0
SUM OF ALL RESPONSES TO PHQ9 QUESTIONS 1 & 2: 0

## 2022-09-14 NOTE — PROGRESS NOTES
Cong Thomason (:  1960) is a 58 y.o. female,Established patient, here for evaluation of the following chief complaint(s):  Follow-Up from Hospital (Traveling pain to leg when back hurts)        Subjective   SUBJECTIVE/OBJECTIVE:  Pt here today for ED f/u  She was seen at [facility] ED 22 for worsening chronic back pain    She had open orders for an MRI from Dr Linda Stone and this was completed while in the ED. MRI findings showed mild disc bulging L2-3, L3-4, L4-5, and L5-S1 with stenosis at levels L3-4 and L4-5  Pt was given IM Toradol and a Prednisone burst  She feels her sx were mildly alleviated but not resolved  She denies new sx, no saddle paresthesias, no loss of bowel or bladder. Today she is requesting an early prescription for her Percocet as her elevated pain required her to take more than she typically does. Medication is due for refill tomorrow, discussed with pt that she is able to get a muscle relaxant if needed in the interim and her request for a pain shot here in office could be done. Pt also requesting a B12 shot as she has not had one in months and she feels it helps in addition with the Toradol. 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 I encouraged her to at least meet with them for discussion of POC and available options. No other questions or concerns. Review of Systems   Constitutional:  Negative for chills and fever. HENT: Negative. Eyes: Negative. Respiratory:  Negative for cough, chest tightness and shortness of breath. Cardiovascular:  Negative for chest pain, palpitations and leg swelling. Gastrointestinal: Negative. Endocrine: Negative. Genitourinary:  Negative for difficulty urinating. Musculoskeletal:  Positive for back pain (chronic, lumbar). Skin: Negative. Allergic/Immunologic: Negative. Neurological:  Negative for dizziness, weakness, light-headedness and numbness. Hematological: Negative. Psychiatric/Behavioral: Negative. Objective   Physical Exam  Constitutional:       General: She is not in acute distress. Appearance: She is not ill-appearing, toxic-appearing or diaphoretic. Cardiovascular:      Rate and Rhythm: Normal rate and regular rhythm. Pulses: Normal pulses. Heart sounds: Normal heart sounds. Pulmonary:      Effort: Pulmonary effort is normal.      Breath sounds: Normal breath sounds. Musculoskeletal:      Lumbar back: Spasms and tenderness (left SI joint) present. No bony tenderness. Skin:     General: Skin is warm and dry. Neurological:      Mental Status: She is alert. Mental status is at baseline. Sensory: No sensory deficit. Motor: No weakness. Gait: Gait normal.         ASSESSMENT/PLAN:    1. DDD (degenerative disc disease), lumbar  -uncontrolled, worsening, plan for neurosurg referral for management of MRI findings     -     ketorolac (TORADOL) injection 60 mg; 60 mg, IntraMUSCular, ONCE, 1 dose, On 22 at 1515Do not administer for more than 5 days. -     tiZANidine (ZANAFLEX) 4 MG tablet; Take 1 tablet by mouth every 8 hours as needed (muscle spasms), Disp-90 tablet, R-0Normal  -     Rivera Schofield, , Neurosurgery, [address]    2. Spasm of muscle of lower back  -trial PRN Zanaflex for spasms          -     tiZANidine (ZANAFLEX) 4 MG tablet; Take 1 tablet by   mouth every 8 hours as needed (muscle spasms), Disp-90 tablet, R-0Normal    3. Vitamin B12 deficiency  -     cyanocobalamin injection 1,000 mcg; 1,000 mcg, IntraMUSCular, ONCE, 1 dose, On 22 at 1515    Follow up in 3 months (2022) with PCP Dr Awilda Andre for chronic conditions. An electronic signature was used to authenticate this note.     --ESTELLE Robles - CNP

## 2022-09-15 DIAGNOSIS — M51.36 DDD (DEGENERATIVE DISC DISEASE), LUMBAR: ICD-10-CM

## 2022-09-15 DIAGNOSIS — M15.9 PRIMARY OSTEOARTHRITIS INVOLVING MULTIPLE JOINTS: ICD-10-CM

## 2022-09-15 DIAGNOSIS — M79.7 FIBROMYALGIA: ICD-10-CM

## 2022-09-15 RX ORDER — OXYCODONE HYDROCHLORIDE AND ACETAMINOPHEN 5; 325 MG/1; MG/1
1 TABLET ORAL EVERY 6 HOURS PRN
Qty: 120 TABLET | Refills: 0 | Status: SHIPPED | OUTPATIENT
Start: 2022-09-15 | End: 2022-10-13 | Stop reason: SDUPTHER

## 2022-09-15 NOTE — TELEPHONE ENCOUNTER
Gini Wallace is calling to request a refill on the following medication(s):    Medication Request:  Requested Prescriptions     Pending Prescriptions Disp Refills    oxyCODONE-acetaminophen (PERCOCET) 5-325 MG per tablet 120 tablet 0     Sig: Take 1 tablet by mouth every 6 hours as needed for Pain for up to 30 days.  Take lowest dose possible to manage pain       Last Visit Date (If Applicable):  00/80/3063    Next Visit Date:    1/4/2023

## 2022-10-13 DIAGNOSIS — M15.9 PRIMARY OSTEOARTHRITIS INVOLVING MULTIPLE JOINTS: ICD-10-CM

## 2022-10-13 DIAGNOSIS — M79.7 FIBROMYALGIA: ICD-10-CM

## 2022-10-13 DIAGNOSIS — M51.36 DDD (DEGENERATIVE DISC DISEASE), LUMBAR: ICD-10-CM

## 2022-10-13 RX ORDER — OXYCODONE HYDROCHLORIDE AND ACETAMINOPHEN 5; 325 MG/1; MG/1
1 TABLET ORAL EVERY 6 HOURS PRN
Qty: 120 TABLET | Refills: 0 | Status: SHIPPED | OUTPATIENT
Start: 2022-10-15 | End: 2022-11-14

## 2022-10-28 ENCOUNTER — TELEPHONE (OUTPATIENT)
Dept: FAMILY MEDICINE CLINIC | Age: 62
End: 2022-10-28

## 2022-10-28 NOTE — TELEPHONE ENCOUNTER
Please ask patient if she calls back who took her off of work ? . Dr. Kendra Schilling did not take her off work so he cannot write her a note to go back to work

## 2022-10-31 ENCOUNTER — HOSPITAL ENCOUNTER (EMERGENCY)
Age: 62
Discharge: HOME | End: 2022-11-01
Attending: EMERGENCY MEDICINE
Payer: MEDICARE

## 2022-10-31 VITALS
RESPIRATION RATE: 18 BRPM | OXYGEN SATURATION: 98 % | DIASTOLIC BLOOD PRESSURE: 97 MMHG | WEIGHT: 254 LBS | HEIGHT: 67 IN | HEART RATE: 70 BPM | SYSTOLIC BLOOD PRESSURE: 174 MMHG | TEMPERATURE: 98.1 F | BODY MASS INDEX: 39.87 KG/M2

## 2022-10-31 DIAGNOSIS — K57.32 DIVERTICULITIS OF COLON: Primary | ICD-10-CM

## 2022-10-31 LAB
ABSOLUTE EOS #: 0.08 K/UL (ref 0–0.44)
ABSOLUTE IMMATURE GRANULOCYTE: 0.07 K/UL (ref 0–0.3)
ABSOLUTE LYMPH #: 2.59 K/UL (ref 1.1–3.7)
ABSOLUTE MONO #: 0.58 K/UL (ref 0.1–1.2)
ALBUMIN SERPL-MCNC: 4.4 G/DL (ref 3.5–5.2)
ALP BLD-CCNC: 118 U/L (ref 35–104)
ALT SERPL-CCNC: 19 U/L (ref 5–33)
ANION GAP SERPL CALCULATED.3IONS-SCNC: 12 MMOL/L (ref 9–17)
AST SERPL-CCNC: 14 U/L
BASOPHILS # BLD: 1 % (ref 0–2)
BASOPHILS ABSOLUTE: 0.07 K/UL (ref 0–0.2)
BILIRUB SERPL-MCNC: 0.5 MG/DL (ref 0.3–1.2)
BILIRUBIN DIRECT: 0.1 MG/DL
BILIRUBIN, INDIRECT: 0.4 MG/DL (ref 0–1)
BUN BLDV-MCNC: 12 MG/DL (ref 8–23)
BUN/CREAT BLD: 15 (ref 9–20)
CALCIUM SERPL-MCNC: 10.1 MG/DL (ref 8.6–10.4)
CHLORIDE BLD-SCNC: 96 MMOL/L (ref 98–107)
CO2: 27 MMOL/L (ref 20–31)
CREAT SERPL-MCNC: 0.82 MG/DL (ref 0.5–0.9)
EOSINOPHILS RELATIVE PERCENT: 1 % (ref 1–4)
GFR SERPL CREATININE-BSD FRML MDRD: >60 ML/MIN/1.73M2
GLUCOSE BLD-MCNC: 167 MG/DL (ref 70–99)
HCT VFR BLD CALC: 53.1 % (ref 36.3–47.1)
HEMOCCULT SP1 STL QL: NEGATIVE
HEMOGLOBIN: 17.2 G/DL (ref 11.9–15.1)
IMMATURE GRANULOCYTES: 1 %
LACTIC ACID: 1.3 MMOL/L (ref 0.5–2.2)
LIPASE: 14 U/L (ref 13–60)
LYMPHOCYTES # BLD: 22 % (ref 24–43)
MCH RBC QN AUTO: 27.9 PG (ref 25.2–33.5)
MCHC RBC AUTO-ENTMCNC: 32.4 G/DL (ref 28.4–34.8)
MCV RBC AUTO: 86.2 FL (ref 82.6–102.9)
MONOCYTES # BLD: 5 % (ref 3–12)
NRBC AUTOMATED: 0 PER 100 WBC
PDW BLD-RTO: 14 % (ref 11.8–14.4)
PLATELET # BLD: 245 K/UL (ref 138–453)
PMV BLD AUTO: 11.5 FL (ref 8.1–13.5)
POTASSIUM SERPL-SCNC: 4.1 MMOL/L (ref 3.7–5.3)
RBC # BLD: 6.16 M/UL (ref 3.95–5.11)
SEG NEUTROPHILS: 70 % (ref 36–65)
SEGMENTED NEUTROPHILS ABSOLUTE COUNT: 8.43 K/UL (ref 1.5–8.1)
SODIUM BLD-SCNC: 135 MMOL/L (ref 135–144)
TOTAL PROTEIN: 7.7 G/DL (ref 6.4–8.3)
WBC # BLD: 11.8 K/UL (ref 3.5–11.3)

## 2022-10-31 PROCEDURE — 83690 ASSAY OF LIPASE: CPT

## 2022-10-31 PROCEDURE — 87506 IADNA-DNA/RNA PROBE TQ 6-11: CPT

## 2022-10-31 PROCEDURE — C9113 INJ PANTOPRAZOLE SODIUM, VIA: HCPCS | Performed by: NURSE PRACTITIONER

## 2022-10-31 PROCEDURE — 87086 URINE CULTURE/COLONY COUNT: CPT

## 2022-10-31 PROCEDURE — 96375 TX/PRO/DX INJ NEW DRUG ADDON: CPT

## 2022-10-31 PROCEDURE — 80048 BASIC METABOLIC PNL TOTAL CA: CPT

## 2022-10-31 PROCEDURE — 85025 COMPLETE CBC W/AUTO DIFF WBC: CPT

## 2022-10-31 PROCEDURE — 36415 COLL VENOUS BLD VENIPUNCTURE: CPT

## 2022-10-31 PROCEDURE — 82272 OCCULT BLD FECES 1-3 TESTS: CPT

## 2022-10-31 PROCEDURE — 6360000002 HC RX W HCPCS: Performed by: NURSE PRACTITIONER

## 2022-10-31 PROCEDURE — 99285 EMERGENCY DEPT VISIT HI MDM: CPT

## 2022-10-31 PROCEDURE — 87493 C DIFF AMPLIFIED PROBE: CPT

## 2022-10-31 PROCEDURE — 81001 URINALYSIS AUTO W/SCOPE: CPT

## 2022-10-31 PROCEDURE — 87328 CRYPTOSPORIDIUM AG IA: CPT

## 2022-10-31 PROCEDURE — 83605 ASSAY OF LACTIC ACID: CPT

## 2022-10-31 PROCEDURE — 96374 THER/PROPH/DIAG INJ IV PUSH: CPT

## 2022-10-31 PROCEDURE — A4216 STERILE WATER/SALINE, 10 ML: HCPCS | Performed by: NURSE PRACTITIONER

## 2022-10-31 PROCEDURE — 2580000003 HC RX 258: Performed by: NURSE PRACTITIONER

## 2022-10-31 PROCEDURE — 87329 GIARDIA AG IA: CPT

## 2022-10-31 PROCEDURE — 80076 HEPATIC FUNCTION PANEL: CPT

## 2022-10-31 RX ORDER — 0.9 % SODIUM CHLORIDE 0.9 %
1000 INTRAVENOUS SOLUTION INTRAVENOUS ONCE
Status: COMPLETED | OUTPATIENT
Start: 2022-10-31 | End: 2022-11-01

## 2022-10-31 RX ORDER — ONDANSETRON 2 MG/ML
4 INJECTION INTRAMUSCULAR; INTRAVENOUS ONCE
Status: COMPLETED | OUTPATIENT
Start: 2022-10-31 | End: 2022-10-31

## 2022-10-31 RX ADMIN — SODIUM CHLORIDE 40 MG: 9 INJECTION, SOLUTION INTRAMUSCULAR; INTRAVENOUS; SUBCUTANEOUS at 22:53

## 2022-10-31 RX ADMIN — SODIUM CHLORIDE 1000 ML: 9 INJECTION, SOLUTION INTRAVENOUS at 23:05

## 2022-10-31 RX ADMIN — ONDANSETRON 4 MG: 2 INJECTION INTRAMUSCULAR; INTRAVENOUS at 22:53

## 2022-11-01 ENCOUNTER — APPOINTMENT (OUTPATIENT)
Dept: CT IMAGING | Age: 62
End: 2022-11-01
Payer: MEDICARE

## 2022-11-01 LAB
AMORPHOUS: ABNORMAL
BACTERIA: ABNORMAL
BILIRUBIN URINE: NEGATIVE
C DIFFICILE TOXINS, PCR: NORMAL
CAMPYLOBACTER PCR: NORMAL
COLOR: YELLOW
CRYPTOSPORIDIUM ANTIGEN STOOL: NEGATIVE
CULTURE: NORMAL
E COLI ENTEROTOXIGENIC PCR: NORMAL
EPITHELIAL CELLS UA: ABNORMAL /HPF (ref 0–5)
GIARDIA ANTIGEN STOOL: NEGATIVE
GLUCOSE URINE: ABNORMAL
KETONES, URINE: NEGATIVE
LEUKOCYTE ESTERASE, URINE: NEGATIVE
NITRITE, URINE: NEGATIVE
PH UA: 7 (ref 5–8)
PLESIOMONAS SHIGELLOIDES PCR: NORMAL
PROTEIN UA: NEGATIVE
RBC UA: ABNORMAL /HPF (ref 0–2)
SALMONELLA PCR: NORMAL
SHIGATOXIN GENE PCR: NORMAL
SHIGELLA SP PCR: NORMAL
SOURCE: NORMAL
SPECIFIC GRAVITY UA: 1.01 (ref 1–1.03)
SPECIMEN DESCRIPTION: NORMAL
TURBIDITY: ABNORMAL
URINE HGB: NEGATIVE
UROBILINOGEN, URINE: NORMAL
VIBRIO PCR: NORMAL
WBC UA: ABNORMAL /HPF (ref 0–5)
YERSINIA ENTEROCOLITICA PCR: NORMAL

## 2022-11-01 PROCEDURE — 6360000004 HC RX CONTRAST MEDICATION: Performed by: NURSE PRACTITIONER

## 2022-11-01 PROCEDURE — 2580000003 HC RX 258: Performed by: NURSE PRACTITIONER

## 2022-11-01 PROCEDURE — 74177 CT ABD & PELVIS W/CONTRAST: CPT

## 2022-11-01 RX ORDER — ONDANSETRON 4 MG/1
4 TABLET, ORALLY DISINTEGRATING ORAL EVERY 8 HOURS PRN
Qty: 10 TABLET | Refills: 0 | Status: SHIPPED | OUTPATIENT
Start: 2022-11-01

## 2022-11-01 RX ORDER — 0.9 % SODIUM CHLORIDE 0.9 %
80 INTRAVENOUS SOLUTION INTRAVENOUS ONCE
Status: COMPLETED | OUTPATIENT
Start: 2022-11-01 | End: 2022-11-01

## 2022-11-01 RX ORDER — AMOXICILLIN AND CLAVULANATE POTASSIUM 875; 125 MG/1; MG/1
1 TABLET, FILM COATED ORAL 2 TIMES DAILY
Qty: 20 TABLET | Refills: 0 | Status: SHIPPED | OUTPATIENT
Start: 2022-11-01 | End: 2022-11-11

## 2022-11-01 RX ORDER — HYDROCODONE BITARTRATE AND ACETAMINOPHEN 5; 325 MG/1; MG/1
1 TABLET ORAL EVERY 6 HOURS PRN
Qty: 12 TABLET | Refills: 0 | Status: SHIPPED | OUTPATIENT
Start: 2022-11-01 | End: 2022-11-04

## 2022-11-01 RX ORDER — SODIUM CHLORIDE 0.9 % (FLUSH) 0.9 %
10 SYRINGE (ML) INJECTION ONCE
Status: COMPLETED | OUTPATIENT
Start: 2022-11-01 | End: 2022-11-01

## 2022-11-01 RX ADMIN — SODIUM CHLORIDE 80 ML: 9 INJECTION, SOLUTION INTRAVENOUS at 00:26

## 2022-11-01 RX ADMIN — SODIUM CHLORIDE, PRESERVATIVE FREE 10 ML: 5 INJECTION INTRAVENOUS at 00:19

## 2022-11-01 RX ADMIN — IOPAMIDOL 75 ML: 755 INJECTION, SOLUTION INTRAVENOUS at 00:19

## 2022-11-01 ASSESSMENT — ENCOUNTER SYMPTOMS
COLOR CHANGE: 0
BACK PAIN: 0
SHORTNESS OF BREATH: 0
DIARRHEA: 1
ABDOMINAL PAIN: 1
NAUSEA: 1
COUGH: 0
SORE THROAT: 0
VOMITING: 1

## 2022-11-01 NOTE — ED NOTES
Writer attempted to get triage BP on pt. Pt took off BP cuff stating she couldn't take it. Writer tried to take BP from other arm. Pt again took off BP cuff in middle of reading. Pt informed RN needs to check BP. Pt refused.       [facility], RN  10/31/22 2009

## 2022-11-01 NOTE — DISCHARGE INSTR - COC
Continuity of Care Form    Patient Name: Zahraa Pierson   :  1960  MRN:  [id number]    Admit date:  10/31/2022  Discharge date:  ***    Code Status Order: No Order   Advance Directives:     Admitting Physician:  No admitting provider for patient encounter.   PCP: Vasu Contreras DO    Discharging Nurse: [facility] Unit/Room#: [address]  Discharging Unit Phone Number: ***    Emergency Contact:   Extended Emergency Contact Information  Primary Emergency Contact: JohnsonRodri  Address: [address] [place] [address] [address]           [address] [facility],  Evie Oliviaboa   Home Phone: [phone]  Work Phone: [phone]  Mobile Phone: [phone]  Relation: Spouse    Past Surgical History:  Past Surgical History:   Procedure Laterality Date    COLONOSCOPY  2016    poor prep, there appears to be some polyps in the colon mixed with stool, could not be removed       Immunization History:   Immunization History   Administered Date(s) Administered    COVID-19, PFIZER PURPLE top, DILUTE for use, (age 15 y+), 30mcg/0.3mL 08/10/2021, 2021    Influenza Virus Vaccine 2019    Influenza, AFLURIA (age 1 yrs+), FLUZONE, (age 10 mo+), MDV, 0.5mL 10/13/2017, 2018, 2019    Influenza, FLUARIX, FLULAVAL, FLUZONE (age 10 mo+) AND AFLURIA, (age 1 y+), PF, 0.5mL 2020    Pneumococcal Polysaccharide ([id number]) 2017       Active Problems:  Patient Active Problem List   Diagnosis Code    History of total hysterectomy with bilateral salpingo-oophorectomy (BSO) Z90.710, Z90.722, Z90.79    Osteoarthritis M19.90    Essential (primary) hypertension I10    Chronic headaches R51.9, G89.29    Lumbar stenosis with neurogenic claudication M48.062    Class 3 severe obesity due to excess calories with serious comorbidity and body mass index (BMI) of 40.0 to 44.9 in adult ([facility] E66.01, Z68.41    Colon polyps K63.5    Major depressive disorder, recurrent episode, severe with mixed features ([address] F33.2    Fibromyalgia M79.7 Type 2 diabetes mellitus without complication, with long-term current use of insulin ([facility]) E11.9, Z79.4    Chronic vertigo R42    Primary osteoarthritis involving multiple joints M15.9    DDD (degenerative disc disease), lumbar M51.36    Gastroesophageal reflux disease K21.9    Vitamin B12 deficiency E53.8       Isolation/Infection:   Isolation            No Isolation          Patient Infection Status       None to display            Nurse Assessment:  Last Vital Signs: Pulse 70   Temp 98.1 °F (36.7 °C)   Resp 18   Ht 5' 7\" (1.702 m)   Wt 254 lb (115.2 kg)   SpO2 98%   BMI 39.78 kg/m²     Last documented pain score (0-10 scale):    Last Weight:   Wt Readings from Last 1 Encounters:   10/31/22 254 lb (115.2 kg)     Mental Status:  {IP PT MENTAL STATUS:20030}    IV Access:  { ANITA IV ACCESS:666287320}    Nursing Mobility/ADLs:  Walking   {P DME HFQE:204598516}  Transfer  {[facility] DME SFNP:834740651}  Bathing  {P DME MDRC:688084503}  Dressing  {P DME SPKY:495545404}  Toileting  {P DME KIHI:148680427}  Feeding  {[facility] DME JDHV:852607956}  Med Admin  {[facility] DME CCHM:182819926}  Med Delivery   {[facility] MED Delivery:038223699}    Wound Care Documentation and Therapy:        Elimination:  Continence: Bowel: {YES / BJ:09441}  Bladder: {YES / ET:46983}  Urinary Catheter: {Urinary Catheter:868174208}   Colostomy/Ileostomy/Ileal Conduit: {YES / RD:31947}       Date of Last BM: ***  No intake or output data in the 24 hours ending 10/31/22 2224  No intake/output data recorded.     Safety Concerns:     [address] Safety Concerns:732393341}    Impairments/Disabilities:      [address] Impairments/Disabilities:447220600}    Nutrition Therapy:  Current Nutrition Therapy:   [address] Diet List:211096568}    Routes of Feeding: {P DME Other Feedings:144621501}  Liquids: {Slp liquid thickness:94215}  Daily Fluid Restriction: {P DME Yes amt example:033556729}  Last Modified Barium Swallow with Video (Video Swallowing Test): {Done Not Done CMXS:114180585}    Treatments at the Time of Hospital Discharge:   Respiratory Treatments: ***  Oxygen Therapy:  {Therapy; copd oxygen:62468}  Ventilator:    {[facility] Vent EXNJ:677921712}    Rehab Therapies: {THERAPEUTIC INTERVENTION:4281453646}  Weight Bearing Status/Restrictions: { CC Weight Bearin}  Other Medical Equipment (for information only, NOT a DME order):  {EQUIPMENT:037730757}  Other Treatments: ***    Patient's personal belongings (please select all that are sent with patient):  {[facility] DME Belongings:922401397}    RN SIGNATURE:  {Esignature:271455965}    CASE MANAGEMENT/SOCIAL WORK SECTION    Inpatient Status Date: ***    Readmission Risk Assessment Score:  Readmission Risk              Risk of Unplanned Readmission:  0           Discharging to Facility/ Agency   Name:   Address:  Phone:  Fax:    Dialysis Facility (if applicable)   Name:  Address:  Dialysis Schedule:  Phone:  Fax:    / signature: {Esignature:109549207}    PHYSICIAN SECTION    Prognosis: {Prognosis:6014574565}    Condition at Discharge: [address] [place] [address] Patient Condition:927344101}    Rehab Potential (if transferring to Rehab): {Prognosis:6513906734}    Recommended Labs or Other Treatments After Discharge: ***    Physician Certification: I certify the above information and transfer of Tomi Reyna  is necessary for the continuing treatment of the diagnosis listed and that she requires {Admit to Appropriate Level of Care:25015} for {GREATER/LESS:761285650} 30 days.      Update Admission H&P: {P DME Changes in HTQSL:048053102}    PHYSICIAN SIGNATURE:  {Esignature:273941327}

## 2022-11-01 NOTE — ED PROVIDER NOTES
EMERGENCY DEPARTMENT ENCOUNTER   ATTENDING ATTESTATION     Pt Name: Awilda Allen  MRN: [id number]  [place] 1960  Date of evaluation: 10/31/22   Awilda Allen is a 58 y.o. female with CC: Emesis, Headache, Sweats (Pt states symptoms x 3 weeks. 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 ), and Chills      MDM: 51-year-old female presents emergency room with abdominal pain, diarrhea, nausea. Family expressed concerned that they were looking up her symptoms that she may have E. coli poisoning. Initial evaluation she had elevated blood pressure, however has a history of. No fever, no tachycardia. After I had seen her as after she been given medications for comfort. Labs and imaging were reviewed. Mild leukocytosis. Normal liver renal and pancreatic function. No signs of UTI on urinalysis. CT scan of the abdomen concerning for mild diverticulitis with no obvious perforation or abscess. She has a soft nontender nonperitoneal abdomen for me. We discussed discharge home with pain medication, nausea medication, antibiotics. She is open to this. ED Course as of 11/01/22 0153   Mon Oct 31, 2022   2343 3 weeks diarrhea, N/V, mid abd discomfort. Stool solid here. Doesn't appear clinically dehydrated.   [AO]   Tue Nov 01, 2022   0036 Urinalysis with Microscopic(!):    Color, UA Yellow   Turbidity UA SLIGHTLY CLOUDY(!)   Glucose, UA TRACE(!)   Bilirubin, Urine NEGATIVE   Ketones, Urine NEGATIVE   Specific [place], UA 1.015   Urine Hgb NEGATIVE   pH, UA 7.0   Protein, UA NEGATIVE   Urobilinogen, Urine Normal   Nitrite, Urine NEGATIVE   Leukocyte Esterase, Urine NEGATIVE   WBC, UA 0 TO 2   RBC, UA 0 TO 2   Epithelial Cells, UA 10 TO 20   Bacteria, UA FEW(!)   Amorphous, UA 2+(!) [AO]   0132 CT ABDOMEN PELVIS W IV CONTRAST Additional Contrast? None [AO]      ED Course User Index  [AO] Kathy Smith DO       CRITICAL CARE: NONE    EKG: All EKG's are interpreted by the Emergency Department Physician who either signs or Co-signs this chart in the absence of a cardiologist.    RADIOLOGY:All plain film, CT, MRI, and formal ultrasound images (except ED bedside ultrasound) are read by the radiologist, see reports below, unless otherwise noted in MDM or here. CT ABDOMEN PELVIS W IV CONTRAST Additional Contrast? None   Final Result   Sigmoid diverticulosis with mild adjacent fat stranding suggestive of acute   uncomplicated diverticulitis. LABS: All lab results were reviewed by myself, and all abnormals are listed below. Labs Reviewed   CBC WITH AUTO DIFFERENTIAL - Abnormal; Notable for the following components:       Result Value    WBC 11.8 (*)     RBC 6.16 (*)     Hemoglobin 17.2 (*)     Hematocrit 53.1 (*)     Seg Neutrophils 70 (*)     Lymphocytes 22 (*)     Immature Granulocytes 1 (*)     Segs Absolute 8.43 (*)     All other components within normal limits   BASIC METABOLIC PANEL - Abnormal; Notable for the following components:    Glucose 167 (*)     Chloride 96 (*)     All other components within normal limits   HEPATIC FUNCTION PANEL - Abnormal; Notable for the following components:    Alkaline Phosphatase 118 (*)     All other components within normal limits   URINALYSIS WITH MICROSCOPIC - Abnormal; Notable for the following components:    Turbidity UA SLIGHTLY CLOUDY (*)     Glucose, Ur TRACE (*)     Bacteria, UA FEW (*)     Amorphous, UA 2+ (*)     All other components within normal limits   GASTROINTESTINAL PANEL, MOLECULAR   C. DIFFICILE TOXIN MOLECULAR   CULTURE, URINE   LACTIC ACID   LIPASE   BLOOD OCCULT STOOL SCREEN #1   GIARDIA / CRYPTOSPORIDUM ANTIGENS       CONSULTS:  None    FINAL IMPRESSION      1. Diverticulitis of colon          PASTMEDICAL HISTORY   History reviewed. No pertinent past medical history.     SURGICAL HISTORY       Past Surgical History:   Procedure Laterality Date    COLONOSCOPY  06/2016    poor prep, there appears to be some polyps in the colon mixed with stool, could not be removed       CURRENT MEDICATIONS       Previous Medications    ASPIRIN-ACETAMINOPHEN-CAFFEINE (EXCEDRIN MIGRAINE) 250-250-65 MG PER TABLET    Take 2 tablets by mouth 2 times daily    BLOOD GLUCOSE TEST STRIPS (ASCENSIA AUTODISC VI;ONE TOUCH ULTRA TEST VI) STRIP    Use with associated glucose meter. Use 4-5q day    FLUOCINONIDE (LIDEX) 0.05 % CREAM    Apply topically 2 times daily. FLUTICASONE (FLONASE) 50 MCG/ACT NASAL SPRAY    SPRAY TWO SPRAYS IN EACH NOSTRIL ONCE DAILY    FUROSEMIDE (LASIX) 20 MG TABLET    Take 1 tablet by mouth daily    INSULIN GLARGINE (LANTUS SOLOSTAR) 100 UNIT/ML INJECTION PEN    Inject 20 Units into the skin 2 times daily    LANCETS MISC    Pt testing twice daily    LISINOPRIL-HYDROCHLOROTHIAZIDE (PRINZIDE;ZESTORETIC) 20-12.5 MG PER TABLET    Take 1 tablet by mouth daily    METOPROLOL TARTRATE (LOPRESSOR) 50 MG TABLET    Take 1 tablet by mouth daily    MUPIROCIN (BACTROBAN) 2 % OINTMENT    AFFECTED AREA(S) THREE TIMES A DAY    OMEPRAZOLE (PRILOSEC) 40 MG DELAYED RELEASE CAPSULE    Take 1 capsule by mouth every morning (before breakfast)    OXYCODONE-ACETAMINOPHEN (PERCOCET) 5-325 MG PER TABLET    Take 1 tablet by mouth every 6 hours as needed for Pain for up to 30 days. Take lowest dose possible to manage pain    ULTICARE MICRO PEN NEEDLES 32G X 4 MM MISC    Inject 100 each into the skin 2 times daily       ALLERGIES     has No Known Allergies. FAMILY HISTORY     has no family status information on file. SOCIAL HISTORY       Social History     Tobacco Use    Smoking status: Every Day     Packs/day: 0.50     Years: 30.00     Pack years: 15.00     Types: Cigarettes    Smokeless tobacco: Never   Substance Use Topics    Alcohol use: No    Drug use: No       This visit was performed by both a physician and an APC. I personally evaluated and examined the patient.  I performed all aspects of the MDM as documented     Aristides Haro DO  Attending Emergency Physician          Vanita Farias,   11/01/22 0153

## 2022-11-01 NOTE — ED PROVIDER NOTES
[address] ED  eMERGENCY dEPARTMENT eNCOUnter      Pt Name: Jairo Cruz  MRN: [id number]  [place] 1960  Date of evaluation: 10/31/2022  Provider: ESTELLE Robbins 1865       Chief Complaint   Patient presents with    Emesis    Headache    Sweats     Pt states symptoms x 3 weeks. 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 Chills         HISTORY OF PRESENT ILLNESS  (Location/Symptom, Timing/Onset, Context/Setting, Quality, Duration, Modifying Factors, Severity.)   Jairo Cruz is a 58 y.o. female who presents to the emergency department. C/o N/V/D, upper abd pain. Onset was 3 weeks ago. States she had a fever a home. Denies cough, congestion, CP, SOB, urinary sx. She has 1-2 loose stools daily. Rates her pain 8/10. Nursing Notes were reviewed. ALLERGIES     Patient has no known allergies. CURRENT MEDICATIONS       Discharge Medication List as of 11/1/2022  1:50 AM        CONTINUE these medications which have NOT CHANGED    Details   oxyCODONE-acetaminophen (PERCOCET) 5-325 MG per tablet Take 1 tablet by mouth every 6 hours as needed for Pain for up to 30 days. Take lowest dose possible to manage pain, Disp-120 tablet, R-0Normal      blood glucose test strips (ASCENSIA AUTODISC VI;ONE TOUCH ULTRA TEST VI) strip Disp-150 strip, R-5, NormalUse with associated glucose meter.  Use 4-5q day      mupirocin (BACTROBAN) 2 % ointment AFFECTED AREA(S) THREE TIMES A DAY, Disp-15 g, R-2, Normal      metoprolol tartrate (LOPRESSOR) 50 MG tablet Take 1 tablet by mouth daily, Disp-60 tablet, R-5Normal      insulin glargine (LANTUS SOLOSTAR) 100 UNIT/ML injection pen Inject 20 Units into the skin 2 times daily, Disp-5 pen, R-2Normal      omeprazole (PRILOSEC) 40 MG delayed release capsule Take 1 capsule by mouth every morning (before breakfast), Disp-30 capsule, R-5Normal      fluticasone (FLONASE) 50 MCG/ACT nasal spray SPRAY TWO SPRAYS IN EACH NOSTRIL ONCE DAILY, Disp-1 each, R-5Normal      lisinopril-hydroCHLOROthiazide (PRINZIDE;ZESTORETIC) 20-12.5 MG per tablet Take 1 tablet by mouth daily, Disp-30 tablet, R-5Normal      furosemide (LASIX) 20 MG tablet Take 1 tablet by mouth daily, Disp-30 tablet, R-0Normal      ULTICARE MICRO PEN NEEDLES 32G X 4 MM MISC 2 TIMES DAILY Starting Mon 6/28/2021, Disp-100 each, R-3, RAJ, Normal      Lancets MISC Disp-100 each, R-5, NormalPt testing twice daily      fluocinonide (LIDEX) 0.05 % cream Apply topically 2 times daily. , Disp-120 g,R-2, Normal      aspirin-acetaminophen-caffeine (EXCEDRIN MIGRAINE) 250-250-65 MG per tablet Take 2 tablets by mouth 2 times dailyHistorical Med             PAST MEDICAL HISTORY   History reviewed. No pertinent past medical history. SURGICAL HISTORY           Procedure Laterality Date    COLONOSCOPY  06/2016    poor prep, there appears to be some polyps in the colon mixed with stool, could not be removed         FAMILY HISTORY     History reviewed. No pertinent family history. No family status information on file. SOCIAL HISTORY      reports that she has been smoking cigarettes. She has a 15.00 pack-year smoking history. She has never used smokeless tobacco. She reports that she does not drink alcohol and does not use drugs. REVIEW OF SYSTEMS    (2-9 systems for level 4, 10 or more for level 5)     Review of Systems   Constitutional:  Negative for chills, diaphoresis, fatigue and fever. HENT:  Negative for congestion and sore throat. Respiratory:  Negative for cough and shortness of breath. Cardiovascular:  Negative for chest pain. Gastrointestinal:  Positive for abdominal pain, diarrhea, nausea and vomiting. Genitourinary:  Negative for dysuria, flank pain, frequency, hematuria and urgency. Musculoskeletal:  Negative for arthralgias, back pain, myalgias and neck pain. Skin:  Negative for color change, rash and wound.    Neurological:  Negative for dizziness, weakness, light-headedness and headaches. Except as noted above the remainder of the review of systems was reviewed and negative. PHYSICAL EXAM    (up to 7 for level 4, 8 or more for level 5)     ED Triage Vitals [10/31/22 2006]   BP Temp Temp src Heart Rate Resp SpO2 Height Weight   -- 98.1 °F (36.7 °C) -- 70 18 98 % 5' 7\" (1.702 m) 254 lb (115.2 kg)     Physical Exam  Vitals reviewed. Constitutional:       General: She is not in acute distress. Appearance: She is well-developed. She is not diaphoretic. HENT:      Mouth/Throat:      Mouth: Mucous membranes are moist.      Pharynx: Oropharynx is clear. Eyes:      General: No scleral icterus. Conjunctiva/sclera: Conjunctivae normal.   Cardiovascular:      Rate and Rhythm: Normal rate. Pulmonary:      Effort: Pulmonary effort is normal. No respiratory distress. Breath sounds: No stridor. Abdominal:      General: There is no distension. Palpations: Abdomen is soft. Tenderness: There is no abdominal tenderness. There is no guarding. Musculoskeletal:      Cervical back: Neck supple. Comments: Moves extremities. Skin:     General: Skin is warm and dry. Findings: No rash. Neurological:      Mental Status: She is alert and oriented to person, place, and time. GCS: GCS eye subscore is 4. GCS verbal subscore is 5. GCS motor subscore is 6. Motor: Motor function is intact. Coordination: Coordination is intact. Gait: Gait is intact.    Psychiatric:         Behavior: Behavior normal.        DIAGNOSTIC RESULTS     RADIOLOGY:   Non-plain film images such as CT, Ultrasound and MRI are read by the radiologist. Plain radiographic images are visualized and preliminarily interpreted by the emergency physician with the below findings:    Interpretation per the Radiologist below, if available at the time of this note:    CT ABDOMEN PELVIS W IV CONTRAST Additional Contrast? None    Result Date: 11/1/2022  EXAMINATION: CT OF THE ABDOMEN AND PELVIS WITH CONTRAST 11/1/2022 12:14 am TECHNIQUE: CT of the abdomen and pelvis was performed with the administration of intravenous contrast. Multiplanar reformatted images are provided for review. Automated exposure control, iterative reconstruction, and/or weight based adjustment of the mA/kV was utilized to reduce the radiation dose to as low as reasonably achievable. COMPARISON: None. HISTORY: ORDERING SYSTEM PROVIDED HISTORY: pain TECHNOLOGIST PROVIDED HISTORY: pain Decision Support Exception - unselect if not a suspected or confirmed emergency medical condition->Emergency Medical Condition (MA) FINDINGS: Lower Chest: No acute findings. Organs: Cholecystectomy. The liver, pancreas, spleen, adrenals and kidneys reveal no acute findings. 4 cm right adrenal nodule with small foci of macroscopic fat, consistent with myelolipoma. GI/Bowel: There is no bowel dilatation or wall thickening identified. Normal appendix. Diverticulosis is noted in the sigmoid colon with mild adjacent fat stranding. Pelvis: No acute findings. Peritoneum/Retroperitoneum: No free air or free fluid. The aorta is normal in caliber. The visceral branches are patent. No lymphadenopathy. Bones/Soft Tissues: No abnormality identified. *Unless otherwise specified, incidental findings do not require dedicated imaging follow-up. Sigmoid diverticulosis with mild adjacent fat stranding suggestive of acute uncomplicated diverticulitis.          LABS:  Labs Reviewed   CBC WITH AUTO DIFFERENTIAL - Abnormal; Notable for the following components:       Result Value    WBC 11.8 (*)     RBC 6.16 (*)     Hemoglobin 17.2 (*)     Hematocrit 53.1 (*)     Seg Neutrophils 70 (*)     Lymphocytes 22 (*)     Immature Granulocytes 1 (*)     Segs Absolute 8.43 (*)     All other components within normal limits   BASIC METABOLIC PANEL - Abnormal; Notable for the following components:    Glucose 167 (*)     Chloride 96 (*)     All other components within normal limits   HEPATIC FUNCTION PANEL - Abnormal; Notable for the following components:    Alkaline Phosphatase 118 (*)     All other components within normal limits   URINALYSIS WITH MICROSCOPIC - Abnormal; Notable for the following components:    Turbidity UA SLIGHTLY CLOUDY (*)     Glucose, Ur TRACE (*)     Bacteria, UA FEW (*)     Amorphous, UA 2+ (*)     All other components within normal limits   GASTROINTESTINAL PANEL, MOLECULAR   C. DIFFICILE TOXIN MOLECULAR   CULTURE, URINE   LACTIC ACID   LIPASE   BLOOD OCCULT STOOL SCREEN #1   GIARDIA / CRYPTOSPORIDUM ANTIGENS       All other labs were within normal range or not returned as of this dictation. EMERGENCY DEPARTMENT COURSE and DIFFERENTIAL DIAGNOSIS/MDM:   Vitals:    Vitals:    10/31/22 2006 10/31/22 2346   BP:  (!) 174/97   Pulse: 70    Resp: 18    Temp: 98.1 °F (36.7 °C)    SpO2: 98%    Weight: 254 lb (115.2 kg)    Height: 5' 7\" (1.702 m)          MEDICATIONS GIVEN IN THE ED:  Medications   ondansetron (ZOFRAN) injection 4 mg (4 mg IntraVENous Given 10/31/22 2253)   pantoprazole (PROTONIX) 40 mg in sodium chloride (PF) 0.9 % 10 mL injection (40 mg IntraVENous Given 10/31/22 2253)   0.9 % sodium chloride bolus (0 mLs IntraVENous Stopped 11/1/22 0205)   0.9 % sodium chloride bolus (0 mLs IntraVENous Stopped 11/1/22 0205)   iopamidol (ISOVUE-370) 76 % injection 75 mL (75 mLs IntraVENous Given 11/1/22 0019)   sodium chloride flush 0.9 % injection 10 mL (10 mLs IntraVENous Given 11/1/22 0019)       CLINICAL DECISION MAKING:  The patient presented alert with a nontoxic appearance and was seen in conjunction with Dr. Suhail Scott. Testing was pending. Care was resumed to the physician. See her dictation for further evaluation and treatment. Care was provided during an unprecedented national emergency due to the novel coronavirus, Covid-19.            (Please note that portions of this note were completed with a voice recognition program.  Efforts were made to edit the dictations but occasionally words are mis-transcribed.)    ESTELLE Stewart CNP, APRN - CNP  11/01/22 8559

## 2022-11-11 ENCOUNTER — OFFICE VISIT (OUTPATIENT)
Dept: FAMILY MEDICINE CLINIC | Age: 62
End: 2022-11-11
Payer: MEDICARE

## 2022-11-11 VITALS
OXYGEN SATURATION: 97 % | BODY MASS INDEX: 39.78 KG/M2 | DIASTOLIC BLOOD PRESSURE: 80 MMHG | HEART RATE: 113 BPM | WEIGHT: 254 LBS | SYSTOLIC BLOOD PRESSURE: 130 MMHG

## 2022-11-11 DIAGNOSIS — E11.9 TYPE 2 DIABETES MELLITUS WITHOUT COMPLICATION, WITH LONG-TERM CURRENT USE OF INSULIN (HCC): ICD-10-CM

## 2022-11-11 DIAGNOSIS — K57.92 DIVERTICULITIS: ICD-10-CM

## 2022-11-11 DIAGNOSIS — Z79.4 TYPE 2 DIABETES MELLITUS WITHOUT COMPLICATION, WITH LONG-TERM CURRENT USE OF INSULIN (HCC): ICD-10-CM

## 2022-11-11 DIAGNOSIS — M79.7 FIBROMYALGIA: ICD-10-CM

## 2022-11-11 DIAGNOSIS — J30.2 SEASONAL ALLERGIC RHINITIS, UNSPECIFIED TRIGGER: ICD-10-CM

## 2022-11-11 DIAGNOSIS — K21.9 GASTROESOPHAGEAL REFLUX DISEASE, UNSPECIFIED WHETHER ESOPHAGITIS PRESENT: ICD-10-CM

## 2022-11-11 DIAGNOSIS — I10 ESSENTIAL (PRIMARY) HYPERTENSION: ICD-10-CM

## 2022-11-11 DIAGNOSIS — Z09 HOSPITAL DISCHARGE FOLLOW-UP: Primary | ICD-10-CM

## 2022-11-11 PROCEDURE — G8417 CALC BMI ABV UP PARAM F/U: HCPCS | Performed by: FAMILY MEDICINE

## 2022-11-11 PROCEDURE — 2022F DILAT RTA XM EVC RTNOPTHY: CPT | Performed by: FAMILY MEDICINE

## 2022-11-11 PROCEDURE — 1111F DSCHRG MED/CURRENT MED MERGE: CPT | Performed by: FAMILY MEDICINE

## 2022-11-11 PROCEDURE — 3046F HEMOGLOBIN A1C LEVEL >9.0%: CPT | Performed by: FAMILY MEDICINE

## 2022-11-11 PROCEDURE — 3078F DIAST BP <80 MM HG: CPT | Performed by: FAMILY MEDICINE

## 2022-11-11 PROCEDURE — G8427 DOCREV CUR MEDS BY ELIG CLIN: HCPCS | Performed by: FAMILY MEDICINE

## 2022-11-11 PROCEDURE — 99214 OFFICE O/P EST MOD 30 MIN: CPT | Performed by: FAMILY MEDICINE

## 2022-11-11 PROCEDURE — G8484 FLU IMMUNIZE NO ADMIN: HCPCS | Performed by: FAMILY MEDICINE

## 2022-11-11 PROCEDURE — 4004F PT TOBACCO SCREEN RCVD TLK: CPT | Performed by: FAMILY MEDICINE

## 2022-11-11 PROCEDURE — 3074F SYST BP LT 130 MM HG: CPT | Performed by: FAMILY MEDICINE

## 2022-11-11 PROCEDURE — 3017F COLORECTAL CA SCREEN DOC REV: CPT | Performed by: FAMILY MEDICINE

## 2022-11-11 RX ORDER — FLUTICASONE PROPIONATE 50 MCG
SPRAY, SUSPENSION (ML) NASAL
Qty: 1 EACH | Refills: 5 | Status: SHIPPED | OUTPATIENT
Start: 2022-11-11

## 2022-11-11 RX ORDER — METOPROLOL TARTRATE 50 MG/1
50 TABLET, FILM COATED ORAL DAILY
Qty: 60 TABLET | Refills: 5 | Status: SHIPPED | OUTPATIENT
Start: 2022-11-11

## 2022-11-11 RX ORDER — OMEPRAZOLE 40 MG/1
40 CAPSULE, DELAYED RELEASE ORAL
Qty: 30 CAPSULE | Refills: 5 | Status: SHIPPED | OUTPATIENT
Start: 2022-11-11

## 2022-11-11 RX ORDER — INSULIN GLARGINE 100 [IU]/ML
20 INJECTION, SOLUTION SUBCUTANEOUS 2 TIMES DAILY
Qty: 5 ADJUSTABLE DOSE PRE-FILLED PEN SYRINGE | Refills: 2 | Status: SHIPPED | OUTPATIENT
Start: 2022-11-11

## 2022-11-11 RX ORDER — LISINOPRIL AND HYDROCHLOROTHIAZIDE 20; 12.5 MG/1; MG/1
1 TABLET ORAL DAILY
Qty: 30 TABLET | Refills: 5 | Status: SHIPPED | OUTPATIENT
Start: 2022-11-11

## 2022-11-11 RX ORDER — TRAMADOL HYDROCHLORIDE 50 MG/1
100 TABLET ORAL EVERY 8 HOURS PRN
Qty: 180 TABLET | Refills: 0 | Status: SHIPPED | OUTPATIENT
Start: 2022-11-14 | End: 2022-12-14

## 2022-11-11 ASSESSMENT — ENCOUNTER SYMPTOMS
BACK PAIN: 1
SHORTNESS OF BREATH: 0
EYES NEGATIVE: 1
GASTROINTESTINAL NEGATIVE: 1
RESPIRATORY NEGATIVE: 1
ALLERGIC/IMMUNOLOGIC NEGATIVE: 1
BOWEL INCONTINENCE: 0
BLURRED VISION: 0
ORTHOPNEA: 0

## 2022-11-11 NOTE — PROGRESS NOTES
Post-Discharge Transitional Care Follow Up      Charlee Berkowitz   YOB: 1960    Date of Office Visit:  11/11/2022  Date of Hospital Admission: 10/31/22  Date of Hospital Discharge: 11/1/22  Readmission Risk Score (high >=14%. Medium >=10%):No data recorded    Care management risk score Rising risk (score 2-5) and Complex Care (Scores >=6): No Risk Score On File     Non face to face  following discharge, date last encounter closed (first attempt may have been earlier): *No documented post hospital discharge outreach found in the last 14 days     Call initiated 2 business days of discharge: *No response recorded in the last 14 days     Hospital discharge follow-up  -     MD DISCHARGE MEDS RECONCILED W/ CURRENT OUTPATIENT MED LIST  Diverticulitis  Assessment & Plan:   Completing abx  Colonoscopy  Essential (primary) hypertension  Assessment & Plan:   Well-controlled, continue current medications, continue current treatment plan, medication adherence emphasized and lifestyle modifications recommended  Orders:  -     lisinopril-hydroCHLOROthiazide (PRINZIDE;ZESTORETIC) 20-12.5 MG per tablet; Take 1 tablet by mouth daily, Disp-30 tablet, R-5Normal  -     metoprolol tartrate (LOPRESSOR) 50 MG tablet; Take 1 tablet by mouth daily, Disp-60 tablet, R-5Normal  Type 2 diabetes mellitus without complication, with long-term current use of insulin (HCC)  Assessment & Plan:   Uncontrolled, continue current medications, continue current treatment plan, medication adherence emphasized and lifestyle modifications recommended  Orders:  -     insulin glargine (LANTUS SOLOSTAR) 100 UNIT/ML injection pen;  Inject 20 Units into the skin 2 times daily, Disp-5 Adjustable Dose Pre-filled Pen Syringe, R-2Normal  Seasonal allergic rhinitis, unspecified trigger  -     fluticasone (FLONASE) 50 MCG/ACT nasal spray; SPRAY TWO SPRAYS IN EACH NOSTRIL ONCE DAILY, Disp-1 each, R-5Normal  Fibromyalgia  Assessment & Plan:   Borderline controlled, changes made today: wants to change back to Tramadol  Orders:  -     traMADol (ULTRAM) 50 MG tablet; Take 2 tablets by mouth every 8 hours as needed for Pain for up to 30 days. , Disp-180 tablet, R-0Normal  Gastroesophageal reflux disease, unspecified whether esophagitis present  Assessment & Plan:   Well-controlled, continue current medications and continue current treatment plan  Orders:  -     omeprazole (PRILOSEC) 40 MG delayed release capsule; Take 1 capsule by mouth every morning (before breakfast), Disp-30 capsule, R-5Normal    Medical Decision Making: moderate complexity  Return in 3 months (on 2/11/2023). On this date 11/11/2022 I have spent 37 minutes reviewing previous notes, test results and face to face with the patient discussing the diagnosis and importance of compliance with the treatment plan as well as documenting on the day of the visit. Subjective:    [facility]  EMERGENCY DEPARTMENT ENCOUNTER       Pt Name: Stevenson Loo  MRN: [id number]  [place] 1960  Date of evaluation: 10/31/2022  Provider: ESTELLE Daniels CNP     CHIEF COMPLAINT       Chief Complaint  Patient presents with  · Emesis  · Headache  · Sweats      Pt states symptoms x 3 weeks. 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 · Chills           HISTORY OF PRESENT ILLNESS  (Location/Symptom, Timing/Onset, Context/Setting, Quality, Duration, Modifying Factors, Severity.)  Stevenson Loo is a 58 y.o. female who presents to the emergency department. C/o N/V/D, upper abd pain. Onset was 3 weeks ago. States she had a fever a home. Denies cough, congestion, CP, SOB, urinary sx. She has 1-2 loose stools daily. Rates her pain 8/10. MDM: 61-year-old female presents emergency room with abdominal pain, diarrhea, nausea. Family expressed concerned that they were looking up her symptoms that she may have E. coli poisoning.   Initial evaluation she had elevated blood pressure, however has a history of. No fever, no tachycardia. After I had seen her as after she been given medications for comfort. Labs and imaging were reviewed. Mild leukocytosis. Normal liver renal and pancreatic function. No signs of UTI on urinalysis. CT scan of the abdomen concerning for mild diverticulitis with no obvious perforation or abscess. She has a soft nontender nonperitoneal abdomen for me. We discussed discharge home with pain medication, nausea medication, antibiotics. She is open to this. Hypertension  This is a chronic problem. The current episode started more than 1 year ago. The problem has been gradually improving since onset. The problem is controlled. Pertinent negatives include no blurred vision, malaise/fatigue, orthopnea, palpitations, peripheral edema, PND, shortness of breath or sweats. Past treatments include diuretics and ACE inhibitors. The current treatment provides significant improvement. Diabetes  She presents for her follow-up diabetic visit. She has type 2 diabetes mellitus. Her disease course has been stable. There are no hypoglycemic associated symptoms. Pertinent negatives for hypoglycemia include no sweats. There are no diabetic associated symptoms. Pertinent negatives for diabetes include no blurred vision. Symptoms are stable. Current diabetic treatment includes intensive insulin program. She is compliant with treatment most of the time. Her weight is fluctuating minimally. Her home blood glucose trend is fluctuating minimally. An ACE inhibitor/angiotensin II receptor blocker is being taken. Back Pain  This is a chronic problem. The current episode started more than 1 year ago. The problem occurs constantly. The problem has been gradually worsening since onset. The pain is present in the lumbar spine. The quality of the pain is described as shooting and stabbing. The pain is severe. Associated symptoms include leg pain.  Pertinent negatives include no bladder incontinence, bowel incontinence, dysuria, paresis, paresthesias, pelvic pain or perianal numbness. She has tried analgesics for the symptoms. The treatment provided moderate relief. Inpatient course: Discharge summary reviewed- see chart. Interval history/Current status: improving    Patient Active Problem List   Diagnosis    History of total hysterectomy with bilateral salpingo-oophorectomy (BSO)    Osteoarthritis    Essential (primary) hypertension    Chronic headaches    Lumbar stenosis with neurogenic claudication    Class 3 severe obesity due to excess calories with serious comorbidity and body mass index (BMI) of 40.0 to 44.9 in adult [facility])    Colon polyps    Major depressive disorder, recurrent episode, severe with mixed features ([facility] [address]    Fibromyalgia    Type 2 diabetes mellitus without complication, with long-term current use of insulin ([facility])    Chronic vertigo    Primary osteoarthritis involving multiple joints    DDD (degenerative disc disease), lumbar    Gastroesophageal reflux disease    Vitamin B12 deficiency    Diverticulitis       Medications listed as ordered at the time of discharge from hospital     Medication List            Accurate as of November 11, 2022  2:43 PM. If you have any questions, ask your nurse or doctor. CHANGE how you take these medications      traMADol 50 MG tablet  Commonly known as: ULTRAM  Take 2 tablets by mouth every 8 hours as needed for Pain for up to 30 days. Start taking on: November 14, 2022  What changed: These instructions start on November 14, 2022. If you are unsure what to do until then, ask your doctor or other care provider. Changed by: Niya Braga, DO            CONTINUE taking these medications      amoxicillin-clavulanate 875-125 MG per tablet  Commonly known as:  Augmentin  Take 1 tablet by mouth 2 times daily for 10 days     aspirin-acetaminophen-caffeine 112-322-80 MG per tablet  Commonly known as: Estuardo De Souza blood glucose test strips strip  Commonly known as: ASCENSIA AUTODISC VI;ONE TOUCH ULTRA TEST VI  Use with associated glucose meter. Use 4-5q day     fluocinonide 0.05 % cream  Commonly known as: LIDEX  Apply topically 2 times daily.      fluticasone 50 MCG/ACT nasal spray  Commonly known as: FLONASE  SPRAY TWO SPRAYS IN EACH NOSTRIL ONCE DAILY     furosemide 20 MG tablet  Commonly known as: Lasix  Take 1 tablet by mouth daily     Lancets Misc  Pt testing twice daily     Lantus SoloStar 100 UNIT/ML injection pen  Generic drug: insulin glargine  Inject 20 Units into the skin 2 times daily     lisinopril-hydroCHLOROthiazide 20-12.5 MG per tablet  Commonly known as: PRINZIDE;ZESTORETIC  Take 1 tablet by mouth daily     metoprolol tartrate 50 MG tablet  Commonly known as: LOPRESSOR  Take 1 tablet by mouth daily     mupirocin 2 % ointment  Commonly known as: BACTROBAN  AFFECTED AREA(S) THREE TIMES A DAY     omeprazole 40 MG delayed release capsule  Commonly known as: PRILOSEC  Take 1 capsule by mouth every morning (before breakfast)     ondansetron 4 MG disintegrating tablet  Commonly known as: Zofran ODT  Take 1 tablet by mouth every 8 hours as needed for Nausea or Vomiting     UltiCare Micro Pen Needles 32G X 4 MM Misc  Generic drug: Insulin Pen Needle  Inject 100 each into the skin 2 times daily               Where to Get Your Medications        These medications were sent to [facility] [id number] [facility] [phone]  [address] [place] [address]      Phone: [phone]   fluticasone 50 MCG/ACT nasal spray  Lantus SoloStar 100 UNIT/ML injection pen  lisinopril-hydroCHLOROthiazide 20-12.5 MG per tablet  metoprolol tartrate 50 MG tablet  omeprazole 40 MG delayed release capsule  traMADol 50 MG tablet          Medications marked \"taking\" at this time  Outpatient Medications Marked as Taking for the 11/11/22 encounter (Office Visit) with Mehran Cedillo [facility], DO   Medication Sig Dispense Refill    insulin glargine (LANTUS SOLOSTAR) 100 UNIT/ML injection pen Inject 20 Units into the skin 2 times daily 5 Adjustable Dose Pre-filled Pen Syringe 2    fluticasone (FLONASE) 50 MCG/ACT nasal spray SPRAY TWO SPRAYS IN EACH NOSTRIL ONCE DAILY 1 each 5    [START ON 11/14/2022] traMADol (ULTRAM) 50 MG tablet Take 2 tablets by mouth every 8 hours as needed for Pain for up to 30 days. 180 tablet 0    omeprazole (PRILOSEC) 40 MG delayed release capsule Take 1 capsule by mouth every morning (before breakfast) 30 capsule 5    lisinopril-hydroCHLOROthiazide (PRINZIDE;ZESTORETIC) 20-12.5 MG per tablet Take 1 tablet by mouth daily 30 tablet 5    metoprolol tartrate (LOPRESSOR) 50 MG tablet Take 1 tablet by mouth daily 60 tablet 5    amoxicillin-clavulanate (AUGMENTIN) 875-125 MG per tablet Take 1 tablet by mouth 2 times daily for 10 days 20 tablet 0    ondansetron (ZOFRAN ODT) 4 MG disintegrating tablet Take 1 tablet by mouth every 8 hours as needed for Nausea or Vomiting 10 tablet 0    blood glucose test strips (ASCENSIA AUTODISC VI;ONE TOUCH ULTRA TEST VI) strip Use with associated glucose meter. Use 4-5q day 150 strip 5    mupirocin (BACTROBAN) 2 % ointment AFFECTED AREA(S) THREE TIMES A DAY 15 g 2    furosemide (LASIX) 20 MG tablet Take 1 tablet by mouth daily 30 tablet 0    ULTICARE MICRO PEN NEEDLES 32G X 4 MM MISC Inject 100 each into the skin 2 times daily 100 each 3    Lancets MISC Pt testing twice daily 100 each 5    fluocinonide (LIDEX) 0.05 % cream Apply topically 2 times daily. 120 g 2    aspirin-acetaminophen-caffeine (EXCEDRIN MIGRAINE) 611-449-43 MG per tablet Take 2 tablets by mouth 2 times daily          Medications patient taking as of now reconciled against medications ordered at time of hospital discharge: Yes    Review of Systems   Constitutional: Negative. Negative for malaise/fatigue. HENT: Negative. Eyes: Negative. Negative for blurred vision. Respiratory: Negative. Negative for shortness of breath. Cardiovascular: Negative. Negative for palpitations, orthopnea and PND. Gastrointestinal: Negative. Negative for bowel incontinence. Endocrine: Negative. Genitourinary: Negative. Negative for bladder incontinence, dysuria and pelvic pain. Musculoskeletal: Negative. Skin: Negative. Allergic/Immunologic: Negative. Neurological: Negative. Negative for paresthesias. Hematological: Negative. Psychiatric/Behavioral: Negative. All other systems reviewed and are negative. Objective:    /80   Pulse (!) 113   Wt 254 lb (115.2 kg)   SpO2 97%   BMI 39.78 kg/m²   Physical Exam  Vitals and nursing note reviewed. Constitutional:       General: She is not in acute distress. Appearance: Normal appearance. She is obese. She is not ill-appearing, toxic-appearing or diaphoretic. HENT:      Head: Normocephalic and atraumatic. Right Ear: Tympanic membrane, ear canal and external ear normal. There is no impacted cerumen. Left Ear: Tympanic membrane, ear canal and external ear normal. There is no impacted cerumen. Nose: Nose normal. No congestion or rhinorrhea. Mouth/Throat:      Mouth: Mucous membranes are moist.      Pharynx: Oropharynx is clear. No oropharyngeal exudate or posterior oropharyngeal erythema. Eyes:      General: No scleral icterus. Right eye: No discharge. Left eye: No discharge. Extraocular Movements: Extraocular movements intact. Conjunctiva/sclera: Conjunctivae normal.      Pupils: Pupils are equal, round, and reactive to light. Cardiovascular:      Rate and Rhythm: Normal rate and regular rhythm. Pulses: Normal pulses. Heart sounds: Normal heart sounds. No murmur heard. No friction rub. No gallop. Pulmonary:      Effort: Pulmonary effort is normal. No respiratory distress. Breath sounds: Normal breath sounds. No stridor.  No wheezing, rhonchi or rales. Chest:      Chest wall: No tenderness. Abdominal:      General: Abdomen is flat. Bowel sounds are normal. There is no distension. Palpations: Abdomen is soft. There is no mass. Tenderness: There is no abdominal tenderness. There is no right CVA tenderness, left CVA tenderness, guarding or rebound. Hernia: No hernia is present. Musculoskeletal:      Cervical back: Normal range of motion and neck supple. Lumbar back: Spasms and tenderness present. No swelling, edema, deformity, lacerations or bony tenderness. Decreased range of motion. Skin:     General: Skin is warm. Capillary Refill: Capillary refill takes less than 2 seconds. Coloration: Skin is not jaundiced or pale. Findings: No bruising, erythema, lesion or rash. Neurological:      General: No focal deficit present. Mental Status: She is alert and oriented to person, place, and time. Mental status is at baseline. Cranial Nerves: No cranial nerve deficit. Sensory: No sensory deficit. Motor: No weakness. Coordination: Coordination normal.      Gait: Gait normal.      Deep Tendon Reflexes: Reflexes normal.   Psychiatric:         Mood and Affect: Mood normal.         Behavior: Behavior normal.         Thought Content: Thought content normal.         Judgment: Judgment normal.       An electronic signature was used to authenticate this note.   --Renetta Nassar DO

## 2022-12-14 ENCOUNTER — TELEPHONE (OUTPATIENT)
Dept: FAMILY MEDICINE CLINIC | Age: 62
End: 2022-12-14

## 2022-12-14 DIAGNOSIS — M79.7 FIBROMYALGIA: ICD-10-CM

## 2022-12-14 NOTE — TELEPHONE ENCOUNTER
Nicko Bullock would you be able to call this in for pt's Fibromyalgia? She has been without for 2 days .     Kevin Flores

## 2022-12-15 RX ORDER — TRAMADOL HYDROCHLORIDE 50 MG/1
100 TABLET ORAL EVERY 8 HOURS PRN
Qty: 180 TABLET | Refills: 0 | Status: SHIPPED | OUTPATIENT
Start: 2022-12-15 | End: 2023-01-14

## 2022-12-15 NOTE — TELEPHONE ENCOUNTER
12/15/22  1:38 pm    [facility]     Medication sent to pharmacy    Darek Nuenz

## 2023-01-05 ENCOUNTER — TELEPHONE (OUTPATIENT)
Dept: FAMILY MEDICINE CLINIC | Age: 63
End: 2023-01-05

## 2023-01-05 DIAGNOSIS — Z79.4 TYPE 2 DIABETES MELLITUS WITHOUT COMPLICATION, WITH LONG-TERM CURRENT USE OF INSULIN (HCC): ICD-10-CM

## 2023-01-05 DIAGNOSIS — E11.9 TYPE 2 DIABETES MELLITUS WITHOUT COMPLICATION, WITH LONG-TERM CURRENT USE OF INSULIN (HCC): ICD-10-CM

## 2023-01-05 NOTE — TELEPHONE ENCOUNTER
Patient would like Motrin 800 mg for knee pain I didn't see it in her chart. Cecelia Patrick is calling to request a refill on the following medication(s):    Medication Request:  Requested Prescriptions     Pending Prescriptions Disp Refills    blood glucose test strips (ASCENSIA AUTODISC VI;ONE TOUCH ULTRA TEST VI) strip 150 strip 5     Sig: Use with associated glucose meter.  Use 4-5q day       Last Visit Date (If Applicable):  45/43/5671    Next Visit Date:    2/16/2023

## 2023-01-11 ENCOUNTER — OFFICE VISIT (OUTPATIENT)
Dept: FAMILY MEDICINE CLINIC | Age: 63
End: 2023-01-11

## 2023-01-11 VITALS
DIASTOLIC BLOOD PRESSURE: 100 MMHG | BODY MASS INDEX: 38.61 KG/M2 | OXYGEN SATURATION: 98 % | SYSTOLIC BLOOD PRESSURE: 164 MMHG | WEIGHT: 246 LBS | HEIGHT: 67 IN | HEART RATE: 103 BPM

## 2023-01-11 DIAGNOSIS — M25.562 CHRONIC PAIN OF LEFT KNEE: Primary | ICD-10-CM

## 2023-01-11 DIAGNOSIS — E53.8 VITAMIN B12 DEFICIENCY: ICD-10-CM

## 2023-01-11 DIAGNOSIS — Z23 IMMUNIZATION DUE: ICD-10-CM

## 2023-01-11 DIAGNOSIS — I10 ESSENTIAL (PRIMARY) HYPERTENSION: ICD-10-CM

## 2023-01-11 DIAGNOSIS — G89.29 CHRONIC PAIN OF LEFT KNEE: Primary | ICD-10-CM

## 2023-01-11 RX ORDER — IBUPROFEN 600 MG/1
600 TABLET ORAL EVERY 8 HOURS PRN
Qty: 360 TABLET | Refills: 1 | Status: SHIPPED | OUTPATIENT
Start: 2023-01-11

## 2023-01-11 RX ORDER — CYANOCOBALAMIN 1000 UG/ML
1000 INJECTION, SOLUTION INTRAMUSCULAR; SUBCUTANEOUS ONCE
Status: COMPLETED | OUTPATIENT
Start: 2023-01-11 | End: 2023-01-11

## 2023-01-11 RX ORDER — KETOROLAC TROMETHAMINE 30 MG/ML
60 INJECTION, SOLUTION INTRAMUSCULAR; INTRAVENOUS ONCE
Status: COMPLETED | OUTPATIENT
Start: 2023-01-11 | End: 2023-01-11

## 2023-01-11 RX ADMIN — KETOROLAC TROMETHAMINE 60 MG: 30 INJECTION, SOLUTION INTRAMUSCULAR; INTRAVENOUS at 11:50

## 2023-01-11 RX ADMIN — CYANOCOBALAMIN 1000 MCG: 1000 INJECTION, SOLUTION INTRAMUSCULAR; SUBCUTANEOUS at 11:51

## 2023-01-11 ASSESSMENT — PATIENT HEALTH QUESTIONNAIRE - PHQ9
8. MOVING OR SPEAKING SO SLOWLY THAT OTHER PEOPLE COULD HAVE NOTICED. OR THE OPPOSITE, BEING SO FIGETY OR RESTLESS THAT YOU HAVE BEEN MOVING AROUND A LOT MORE THAN USUAL: 0
4. FEELING TIRED OR HAVING LITTLE ENERGY: 0
SUM OF ALL RESPONSES TO PHQ QUESTIONS 1-9: 1
5. POOR APPETITE OR OVEREATING: 0
7. TROUBLE CONCENTRATING ON THINGS, SUCH AS READING THE NEWSPAPER OR WATCHING TELEVISION: 0
3. TROUBLE FALLING OR STAYING ASLEEP: 0
2. FEELING DOWN, DEPRESSED OR HOPELESS: 1
9. THOUGHTS THAT YOU WOULD BE BETTER OFF DEAD, OR OF HURTING YOURSELF: 0
SUM OF ALL RESPONSES TO PHQ QUESTIONS 1-9: 1
10. IF YOU CHECKED OFF ANY PROBLEMS, HOW DIFFICULT HAVE THESE PROBLEMS MADE IT FOR YOU TO DO YOUR WORK, TAKE CARE OF THINGS AT HOME, OR GET ALONG WITH OTHER PEOPLE: 0
1. LITTLE INTEREST OR PLEASURE IN DOING THINGS: 0
SUM OF ALL RESPONSES TO PHQ QUESTIONS 1-9: 1
SUM OF ALL RESPONSES TO PHQ9 QUESTIONS 1 & 2: 1
6. FEELING BAD ABOUT YOURSELF - OR THAT YOU ARE A FAILURE OR HAVE LET YOURSELF OR YOUR FAMILY DOWN: 0
SUM OF ALL RESPONSES TO PHQ QUESTIONS 1-9: 1

## 2023-01-11 ASSESSMENT — ENCOUNTER SYMPTOMS: COLOR CHANGE: 0

## 2023-01-11 NOTE — PROGRESS NOTES
Trinity Wen (:  1960) is a 58 y.o. female,Established patient, here for evaluation of the following chief complaint(s):  Knee Pain (Complains of left knee pain ) and Flu Vaccine (Accepts flu vaccine )          Subjective   SUBJECTIVE/OBJECTIVE:  Pt here today for eval of left knee pain  BP elevated, pt states this is because of pain    Pt denies injury, reports she has had worsening chronic left knee pain over the last month. She had arthroscopy done to this knee approx 5 years ago, has not followed regularly w/ ortho since. She is using an ACE wrap when at work, taking chronic pain medications and feels no relief. Pt requested that her tramadol script be printed off early to reduce delay in her filling her medications, I had a long discussion w/ pt that this is no longer allowed so that controlled medications can be closely monitored. She is due for re-fill in 4 days, encouraged to request refill as per usual and educated that we as a prescriber do not have control over the availability or date of dispense by the pharmacy. This is a controlled med and is managed on a 30 day basis. Pt is requesting a \"pain and B12 shot\" as these typically help relieve acute discomfort. Review of Systems   Constitutional:  Negative for chills and fever. Musculoskeletal:  Positive for arthralgias, gait problem and joint swelling. Skin:  Negative for color change, rash and wound. Objective   Physical Exam  Vitals and nursing note reviewed. Constitutional:       Appearance: Normal appearance. She is obese. Musculoskeletal:      Left knee: Swelling and effusion present. No bony tenderness or crepitus. Normal range of motion. Tenderness present over the medial joint line and lateral joint line. No patellar tendon tenderness. Instability Tests: Anterior drawer test negative. Posterior drawer test negative.         Legs:       Comments: Small lateral swelling/tenderness, no erythema, no s/s of infection   Neurological:      Mental Status: She is alert. ASSESSMENT/PLAN:  1. Chronic pain of left knee  -previously identified arthritis on 2018 xray  -update xray  -encouraged RICE, use of NSAID for inflammation, continued use of ACE wrap  -pt wants to await xray result until she considers ortho referral    -     XR KNEE LEFT (3 VIEWS); Future  -     ibuprofen (ADVIL;MOTRIN) 600 MG tablet; Take 1 tablet by mouth every 8 hours as needed for Pain, Disp-360 tablet, R-1Normal  -     ketorolac (TORADOL) injection 60 mg; 60 mg, IntraMUSCular, ONCE, 1 dose, On Wed 1/11/23 at 1200Do not administer for more than 5 days    2. Essential (primary) hypertension  Assessment & Plan:   Unclear control, continue current medications  -elevated in office d/t pain    3. Immunization due  -     Influenza, FLUCELVAX, (age 10 mo+), IM, Preservative Free, 0.5 mL    4. Vitamin B12 deficiency  -     cyanocobalamin injection 1,000 mcg; 1,000 mcg, IntraMUSCular, ONCE, 1 dose, On Wed 1/11/23 at 1200    Return in about 5 weeks (around 2/16/2023), or if symptoms worsen or fail to improve, for keep upcoming appt georgiana/ karrie. An electronic signature was used to authenticate this note.     --ESTELLE Cristobal - CNP

## 2023-01-13 DIAGNOSIS — K21.9 GASTROESOPHAGEAL REFLUX DISEASE, UNSPECIFIED WHETHER ESOPHAGITIS PRESENT: ICD-10-CM

## 2023-01-13 DIAGNOSIS — M79.7 FIBROMYALGIA: ICD-10-CM

## 2023-01-13 NOTE — TELEPHONE ENCOUNTER
Hai Flores is calling to request a refill on the following medication(s):    Medication Request:  Requested Prescriptions     Pending Prescriptions Disp Refills    traMADol (ULTRAM) 50 MG tablet 180 tablet 0     Sig: Take 2 tablets by mouth every 8 hours as needed for Pain for up to 30 days.     omeprazole (PRILOSEC) 40 MG delayed release capsule 30 capsule 5     Sig: Take 1 capsule by mouth every morning (before breakfast)       Last Visit Date (If Applicable):  75/35/7628    Next Visit Date:    2/16/2023

## 2023-01-14 DIAGNOSIS — M79.7 FIBROMYALGIA: ICD-10-CM

## 2023-01-15 RX ORDER — TRAMADOL HYDROCHLORIDE 50 MG/1
100 TABLET ORAL EVERY 8 HOURS PRN
Qty: 180 TABLET | Refills: 0 | Status: SHIPPED | OUTPATIENT
Start: 2023-01-15 | End: 2023-02-14

## 2023-01-15 RX ORDER — OMEPRAZOLE 40 MG/1
40 CAPSULE, DELAYED RELEASE ORAL
Qty: 30 CAPSULE | Refills: 5 | Status: SHIPPED | OUTPATIENT
Start: 2023-01-15

## 2023-01-16 RX ORDER — TRAMADOL HYDROCHLORIDE 50 MG/1
TABLET ORAL
Qty: 180 TABLET | OUTPATIENT
Start: 2023-01-16

## 2023-02-01 ENCOUNTER — APPOINTMENT (OUTPATIENT)
Dept: CT IMAGING | Age: 63
End: 2023-02-01
Payer: MEDICAID

## 2023-02-01 ENCOUNTER — HOSPITAL ENCOUNTER (EMERGENCY)
Age: 63
Discharge: HOME | End: 2023-02-01
Attending: EMERGENCY MEDICINE
Payer: MEDICAID

## 2023-02-01 VITALS
BODY MASS INDEX: 36.73 KG/M2 | RESPIRATION RATE: 18 BRPM | TEMPERATURE: 97.6 F | DIASTOLIC BLOOD PRESSURE: 66 MMHG | HEART RATE: 83 BPM | HEIGHT: 67 IN | OXYGEN SATURATION: 97 % | WEIGHT: 234 LBS | SYSTOLIC BLOOD PRESSURE: 146 MMHG

## 2023-02-01 DIAGNOSIS — E87.6 HYPOKALEMIA: ICD-10-CM

## 2023-02-01 DIAGNOSIS — R19.7 NAUSEA VOMITING AND DIARRHEA: ICD-10-CM

## 2023-02-01 DIAGNOSIS — R11.2 NAUSEA VOMITING AND DIARRHEA: ICD-10-CM

## 2023-02-01 DIAGNOSIS — E86.0 DEHYDRATION: Primary | ICD-10-CM

## 2023-02-01 LAB
ABSOLUTE EOS #: 0.2 K/UL (ref 0–0.44)
ABSOLUTE IMMATURE GRANULOCYTE: 0.07 K/UL (ref 0–0.3)
ABSOLUTE LYMPH #: 3.3 K/UL (ref 1.1–3.7)
ABSOLUTE MONO #: 0.9 K/UL (ref 0.1–1.2)
ALBUMIN SERPL-MCNC: 3.9 G/DL (ref 3.5–5.2)
ALP SERPL-CCNC: 125 U/L (ref 35–104)
ALT SERPL-CCNC: 29 U/L (ref 5–33)
ANION GAP SERPL CALCULATED.3IONS-SCNC: 12 MMOL/L (ref 9–17)
AST SERPL-CCNC: 23 U/L
BASOPHILS # BLD: 1 % (ref 0–2)
BASOPHILS ABSOLUTE: 0.11 K/UL (ref 0–0.2)
BILIRUB SERPL-MCNC: 0.5 MG/DL (ref 0.3–1.2)
BILIRUBIN URINE: NEGATIVE
BUN SERPL-MCNC: 40 MG/DL (ref 8–23)
BUN/CREAT BLD: 28 (ref 9–20)
CALCIUM SERPL-MCNC: 9.4 MG/DL (ref 8.6–10.4)
CHLORIDE SERPL-SCNC: 95 MMOL/L (ref 98–107)
CO2 SERPL-SCNC: 27 MMOL/L (ref 20–31)
COLOR: YELLOW
CREAT SERPL-MCNC: 1.42 MG/DL (ref 0.5–0.9)
EOSINOPHILS RELATIVE PERCENT: 2 % (ref 1–4)
GFR SERPL CREATININE-BSD FRML MDRD: 42 ML/MIN/1.73M2
GLUCOSE SERPL-MCNC: 268 MG/DL (ref 70–99)
GLUCOSE UR STRIP.AUTO-MCNC: NEGATIVE MG/DL
HCT VFR BLD AUTO: 53.2 % (ref 36.3–47.1)
HGB BLD-MCNC: 17.6 G/DL (ref 11.9–15.1)
IMMATURE GRANULOCYTES: 1 %
KETONES UR STRIP.AUTO-MCNC: NEGATIVE MG/DL
LEUKOCYTE ESTERASE UR QL STRIP.AUTO: NEGATIVE
LIPASE SERPL-CCNC: 14 U/L (ref 13–60)
LYMPHOCYTES # BLD: 24 % (ref 24–43)
MAGNESIUM SERPL-MCNC: 2.1 MG/DL (ref 1.6–2.6)
MCH RBC QN AUTO: 27.9 PG (ref 25.2–33.5)
MCHC RBC AUTO-ENTMCNC: 33.1 G/DL (ref 28.4–34.8)
MCV RBC AUTO: 84.3 FL (ref 82.6–102.9)
MONOCYTES # BLD: 7 % (ref 3–12)
NITRITE UR QL STRIP.AUTO: NEGATIVE
NRBC AUTOMATED: 0 PER 100 WBC
PDW BLD-RTO: 14.6 % (ref 11.8–14.4)
PLATELET # BLD AUTO: 229 K/UL (ref 138–453)
PMV BLD AUTO: 12.2 FL (ref 8.1–13.5)
POTASSIUM SERPL-SCNC: 2.8 MMOL/L (ref 3.7–5.3)
PROT SERPL-MCNC: 6.7 G/DL (ref 6.4–8.3)
PROT UR STRIP.AUTO-MCNC: 5.5 MG/DL (ref 5–8)
PROT UR STRIP.AUTO-MCNC: NEGATIVE MG/DL
RBC # BLD: 6.31 M/UL (ref 3.95–5.11)
RBC # BLD: ABNORMAL 10*6/UL
SEG NEUTROPHILS: 65 % (ref 36–65)
SEGMENTED NEUTROPHILS ABSOLUTE COUNT: 9.14 K/UL (ref 1.5–8.1)
SODIUM SERPL-SCNC: 134 MMOL/L (ref 135–144)
SPECIFIC GRAVITY UA: NORMAL (ref 1–1.03)
TURBIDITY: CLEAR
URINE HGB: NEGATIVE
UROBILINOGEN, URINE: NORMAL
WBC # BLD AUTO: 13.7 K/UL (ref 3.5–11.3)

## 2023-02-01 PROCEDURE — 74177 CT ABD & PELVIS W/CONTRAST: CPT

## 2023-02-01 PROCEDURE — 2580000003 HC RX 258: Performed by: EMERGENCY MEDICINE

## 2023-02-01 PROCEDURE — 96374 THER/PROPH/DIAG INJ IV PUSH: CPT

## 2023-02-01 PROCEDURE — 6360000004 HC RX CONTRAST MEDICATION: Performed by: EMERGENCY MEDICINE

## 2023-02-01 PROCEDURE — A4216 STERILE WATER/SALINE, 10 ML: HCPCS | Performed by: PHYSICIAN ASSISTANT

## 2023-02-01 PROCEDURE — 96361 HYDRATE IV INFUSION ADD-ON: CPT

## 2023-02-01 PROCEDURE — 83690 ASSAY OF LIPASE: CPT

## 2023-02-01 PROCEDURE — 99285 EMERGENCY DEPT VISIT HI MDM: CPT

## 2023-02-01 PROCEDURE — 2580000003 HC RX 258: Performed by: PHYSICIAN ASSISTANT

## 2023-02-01 PROCEDURE — 96375 TX/PRO/DX INJ NEW DRUG ADDON: CPT

## 2023-02-01 PROCEDURE — 83735 ASSAY OF MAGNESIUM: CPT

## 2023-02-01 PROCEDURE — 2500000003 HC RX 250 WO HCPCS: Performed by: PHYSICIAN ASSISTANT

## 2023-02-01 PROCEDURE — 6370000000 HC RX 637 (ALT 250 FOR IP): Performed by: PHYSICIAN ASSISTANT

## 2023-02-01 PROCEDURE — 81003 URINALYSIS AUTO W/O SCOPE: CPT

## 2023-02-01 PROCEDURE — 6360000002 HC RX W HCPCS: Performed by: PHYSICIAN ASSISTANT

## 2023-02-01 PROCEDURE — 85025 COMPLETE CBC W/AUTO DIFF WBC: CPT

## 2023-02-01 PROCEDURE — 96376 TX/PRO/DX INJ SAME DRUG ADON: CPT

## 2023-02-01 PROCEDURE — 80053 COMPREHEN METABOLIC PANEL: CPT

## 2023-02-01 RX ORDER — 0.9 % SODIUM CHLORIDE 0.9 %
80 INTRAVENOUS SOLUTION INTRAVENOUS ONCE
Status: DISCONTINUED | OUTPATIENT
Start: 2023-02-01 | End: 2023-02-01 | Stop reason: HOSPADM

## 2023-02-01 RX ORDER — 0.9 % SODIUM CHLORIDE 0.9 %
1000 INTRAVENOUS SOLUTION INTRAVENOUS ONCE
Status: COMPLETED | OUTPATIENT
Start: 2023-02-01 | End: 2023-02-01

## 2023-02-01 RX ORDER — ONDANSETRON 4 MG/1
4 TABLET, ORALLY DISINTEGRATING ORAL EVERY 8 HOURS PRN
Qty: 21 TABLET | Refills: 0 | Status: SHIPPED | OUTPATIENT
Start: 2023-02-01 | End: 2023-02-08

## 2023-02-01 RX ORDER — MORPHINE SULFATE 4 MG/ML
4 INJECTION, SOLUTION INTRAMUSCULAR; INTRAVENOUS ONCE
Status: COMPLETED | OUTPATIENT
Start: 2023-02-01 | End: 2023-02-01

## 2023-02-01 RX ORDER — SODIUM CHLORIDE 0.9 % (FLUSH) 0.9 %
10 SYRINGE (ML) INJECTION PRN
Status: DISCONTINUED | OUTPATIENT
Start: 2023-02-01 | End: 2023-02-01 | Stop reason: HOSPADM

## 2023-02-01 RX ORDER — DICYCLOMINE HCL 20 MG
20 TABLET ORAL 4 TIMES DAILY
Qty: 28 TABLET | Refills: 0 | Status: SHIPPED | OUTPATIENT
Start: 2023-02-01 | End: 2023-02-08

## 2023-02-01 RX ORDER — POTASSIUM CHLORIDE 20 MEQ/1
20 TABLET, EXTENDED RELEASE ORAL 2 TIMES DAILY
Qty: 8 TABLET | Refills: 0 | Status: SHIPPED | OUTPATIENT
Start: 2023-02-01 | End: 2023-02-05

## 2023-02-01 RX ORDER — ONDANSETRON 2 MG/ML
4 INJECTION INTRAMUSCULAR; INTRAVENOUS ONCE
Status: COMPLETED | OUTPATIENT
Start: 2023-02-01 | End: 2023-02-01

## 2023-02-01 RX ADMIN — SODIUM CHLORIDE, PRESERVATIVE FREE 10 ML: 5 INJECTION INTRAVENOUS at 16:58

## 2023-02-01 RX ADMIN — ONDANSETRON 4 MG: 2 INJECTION INTRAMUSCULAR; INTRAVENOUS at 15:44

## 2023-02-01 RX ADMIN — MORPHINE SULFATE 4 MG: 4 INJECTION, SOLUTION INTRAMUSCULAR; INTRAVENOUS at 18:32

## 2023-02-01 RX ADMIN — SODIUM CHLORIDE 1000 ML: 9 INJECTION, SOLUTION INTRAVENOUS at 15:55

## 2023-02-01 RX ADMIN — IOPAMIDOL 75 ML: 755 INJECTION, SOLUTION INTRAVENOUS at 16:58

## 2023-02-01 RX ADMIN — MORPHINE SULFATE 4 MG: 4 INJECTION, SOLUTION INTRAMUSCULAR; INTRAVENOUS at 15:46

## 2023-02-01 RX ADMIN — SODIUM CHLORIDE 1000 ML: 9 INJECTION, SOLUTION INTRAVENOUS at 18:31

## 2023-02-01 RX ADMIN — FAMOTIDINE 20 MG: 10 INJECTION, SOLUTION INTRAVENOUS at 15:49

## 2023-02-01 RX ADMIN — Medication 80 ML: at 16:58

## 2023-02-01 RX ADMIN — POTASSIUM BICARBONATE 40 MEQ: 782 TABLET, EFFERVESCENT ORAL at 18:20

## 2023-02-01 ASSESSMENT — PAIN SCALES - GENERAL
PAINLEVEL_OUTOF10: 9
PAINLEVEL_OUTOF10: 6
PAINLEVEL_OUTOF10: 7
PAINLEVEL_OUTOF10: 7
PAINLEVEL_OUTOF10: 8

## 2023-02-01 ASSESSMENT — PAIN DESCRIPTION - LOCATION
LOCATION: BACK
LOCATION: BACK;ABDOMEN
LOCATION: BACK
LOCATION: BACK
LOCATION: ABDOMEN

## 2023-02-01 ASSESSMENT — PAIN DESCRIPTION - ORIENTATION
ORIENTATION: MID
ORIENTATION: MID

## 2023-02-01 ASSESSMENT — PAIN DESCRIPTION - DESCRIPTORS
DESCRIPTORS: SHARP
DESCRIPTORS: SHARP;PRESSURE
DESCRIPTORS: SHARP

## 2023-02-01 NOTE — ED PROVIDER NOTES
EMERGENCY DEPARTMENT ENCOUNTER   ATTENDING ATTESTATION     Pt Name: Rashad Han  MRN: [id number]  [place] 1960  Date of evaluation: 2/1/23   Rashad Han is a 58 y.o. female with CC: Emesis, Nausea, and Fatigue (Pt states she had N/V for 3 days and then stopped today but now she feels fatigued. )    MDM:   The patient is a 60-year-old female who presented to the emergency department secondary to abdominal pain with nausea vomiting. Noted to be hypokalemic replaced. Discharged home with outpatient follow-up and given parameters to return to the emergency department  This visit was performed by both a physician and an APC. I personally evaluated and examined the patient. I performed all aspects of the MDM as documented. CRITICAL CARE:       EKG: All EKG's are interpreted by the Emergency Department Physician who either signs or Co-signs this chart in the absence of a cardiologist.      RADIOLOGY:All plain film, CT, MRI, and formal ultrasound images (except ED bedside ultrasound) are read by the radiologist, see reports below, unless otherwise noted in MDM or here. CT ABDOMEN PELVIS W IV CONTRAST Additional Contrast? None    (Results Pending)     LABS: All lab results were reviewed by myself, and all abnormals are listed below. Labs Reviewed   CBC WITH AUTO DIFFERENTIAL - Abnormal; Notable for the following components:       Result Value    WBC 13.7 (*)     RBC 6.31 (*)     Hemoglobin 17.6 (*)     Hematocrit 53.2 (*)     RDW 14.6 (*)     Immature Granulocytes 1 (*)     Segs Absolute 9.14 (*)     All other components within normal limits   COMPREHENSIVE METABOLIC PANEL   LIPASE     CONSULTS:  None  FINAL IMPRESSION    No diagnosis found. PASTMEDICAL HISTORY   No past medical history on file.   SURGICAL HISTORY       Past Surgical History:   Procedure Laterality Date    COLONOSCOPY  06/2016    poor prep, there appears to be some polyps in the colon mixed with stool, could not be removed CURRENT MEDICATIONS       Previous Medications    ASPIRIN-ACETAMINOPHEN-CAFFEINE (EXCEDRIN MIGRAINE) 250-250-65 MG PER TABLET    Take 2 tablets by mouth 2 times daily    BLOOD GLUCOSE TEST STRIPS (ASCENSIA AUTODISC VI;ONE TOUCH ULTRA TEST VI) STRIP    Use with associated glucose meter. Use 4-5q day    FLUOCINONIDE (LIDEX) 0.05 % CREAM    Apply topically 2 times daily. FLUTICASONE (FLONASE) 50 MCG/ACT NASAL SPRAY    SPRAY TWO SPRAYS IN EACH NOSTRIL ONCE DAILY    FUROSEMIDE (LASIX) 20 MG TABLET    Take 1 tablet by mouth daily    IBUPROFEN (ADVIL;MOTRIN) 600 MG TABLET    Take 1 tablet by mouth every 8 hours as needed for Pain    INSULIN GLARGINE (LANTUS SOLOSTAR) 100 UNIT/ML INJECTION PEN    Inject 20 Units into the skin 2 times daily    LANCETS MISC    Pt testing twice daily    LISINOPRIL-HYDROCHLOROTHIAZIDE (PRINZIDE;ZESTORETIC) 20-12.5 MG PER TABLET    Take 1 tablet by mouth daily    METOPROLOL TARTRATE (LOPRESSOR) 50 MG TABLET    Take 1 tablet by mouth daily    MUPIROCIN (BACTROBAN) 2 % OINTMENT    AFFECTED AREA(S) THREE TIMES A DAY    OMEPRAZOLE (PRILOSEC) 40 MG DELAYED RELEASE CAPSULE    Take 1 capsule by mouth every morning (before breakfast)    ONDANSETRON (ZOFRAN ODT) 4 MG DISINTEGRATING TABLET    Take 1 tablet by mouth every 8 hours as needed for Nausea or Vomiting    TRAMADOL (ULTRAM) 50 MG TABLET    Take 2 tablets by mouth every 8 hours as needed for Pain for up to 30 days. ULTICARE MICRO PEN NEEDLES 32G X 4 MM MISC    Inject 100 each into the skin 2 times daily     ALLERGIES     has No Known Allergies. FAMILY HISTORY     has no family status information on file.       SOCIAL HISTORY       Social History     Tobacco Use    Smoking status: Every Day     Packs/day: 0.50     Years: 30.00     Pack years: 15.00     Types: Cigarettes    Smokeless tobacco: Never   Substance Use Topics    Alcohol use: No    Drug use: No          Sissy Cedeno MD  The care is provided during an unprecedented national emergency due to the novel coronavirus, COVID 19.   Attending Emergency Physician          Barbi Nichols MD  [id number]/[phone]

## 2023-02-02 NOTE — ED PROVIDER NOTES
[address] [place] [address] ED  [facility]      Pt Name: Hai Flores  MRN: [id number]  [place] 1960  Date ofevaluation: 2/1/2023  Provider: Miguel Palomino, [facility]       Chief Complaint   Patient presents with    Emesis    Nausea    Fatigue     Pt states she had N/V for 3 days and then stopped today but now she feels fatigued. HISTORY OF PRESENT ILLNESS  (Location/Symptom, Timing/Onset, Context/Setting, Quality, Duration, Modifying Factors, Severity.)   Hai Flores is a 58 y.o. female who presents to the emergency department with nausea vomiting for last 3 days. Patient denies any vomiting her last few hours but feels very nauseated. Patient also here due to concerns about her  who is currently a patient. Patient reports some diffuse abdominal discomfort. Also had some diarrhea earlier this morning with states her vomiting has improved immensely throughout the course of the day and was worse over the last couple days but compared to the day. Nursing Notes were reviewed. ALLERGIES     Patient has no known allergies. CURRENT MEDICATIONS       Previous Medications    ASPIRIN-ACETAMINOPHEN-CAFFEINE (EXCEDRIN MIGRAINE) 250-250-65 MG PER TABLET    Take 2 tablets by mouth 2 times daily    BLOOD GLUCOSE TEST STRIPS (ASCENSIA AUTODISC VI;ONE TOUCH ULTRA TEST VI) STRIP    Use with associated glucose meter. Use 4-5q day    FLUOCINONIDE (LIDEX) 0.05 % CREAM    Apply topically 2 times daily.     FLUTICASONE (FLONASE) 50 MCG/ACT NASAL SPRAY    SPRAY TWO SPRAYS IN EACH NOSTRIL ONCE DAILY    FUROSEMIDE (LASIX) 20 MG TABLET    Take 1 tablet by mouth daily    IBUPROFEN (ADVIL;MOTRIN) 600 MG TABLET    Take 1 tablet by mouth every 8 hours as needed for Pain    INSULIN GLARGINE (LANTUS SOLOSTAR) 100 UNIT/ML INJECTION PEN    Inject 20 Units into the skin 2 times daily    LANCETS MISC    Pt testing twice daily    LISINOPRIL-HYDROCHLOROTHIAZIDE (PRINZIDE;ZESTORETIC) 20-12.5 MG PER TABLET    Take 1 tablet by mouth daily    METOPROLOL TARTRATE (LOPRESSOR) 50 MG TABLET    Take 1 tablet by mouth daily    MUPIROCIN (BACTROBAN) 2 % OINTMENT    AFFECTED AREA(S) THREE TIMES A DAY    OMEPRAZOLE (PRILOSEC) 40 MG DELAYED RELEASE CAPSULE    Take 1 capsule by mouth every morning (before breakfast)    TRAMADOL (ULTRAM) 50 MG TABLET    Take 2 tablets by mouth every 8 hours as needed for Pain for up to 30 days. ULTICARE MICRO PEN NEEDLES 32G X 4 MM MISC    Inject 100 each into the skin 2 times daily       PAST MEDICAL HISTORY   No past medical history on file. SURGICAL HISTORY           Procedure Laterality Date    COLONOSCOPY  06/2016    poor prep, there appears to be some polyps in the colon mixed with stool, could not be removed         HISTORY     No family history on file. No family status information on file. SOCIAL HISTORY      reports that she has been smoking cigarettes. She has a 15.00 pack-year smoking history. She has never used smokeless tobacco. She reports that she does not drink alcohol and does not use drugs. REVIEW OFSYSTEMS    (2-9 systems for level 4, 10 or more for level 5)   Review of Systems    Except as noted above the remainder of the review of systems was reviewed and negative. PHYSICAL EXAM    (up to 7 for level 4, 8 or more for level 5)     ED Triage Vitals [02/01/23 1457]   BP Temp Temp Source Heart Rate Resp SpO2 Height Weight   (!) 182/98 97.6 °F (36.4 °C) Oral 100 16 97 % 5' 7\" (1.702 m) 234 lb (106.1 kg)     Physical Exam  Constitutional:       Appearance: She is well-developed. HENT:      Head: Normocephalic and atraumatic. Cardiovascular:      Rate and Rhythm: Normal rate and regular rhythm. Pulmonary:      Effort: Pulmonary effort is normal.      Breath sounds: Normal breath sounds. Abdominal:      Palpations: Abdomen is soft. Tenderness: There is generalized abdominal tenderness.    Musculoskeletal: General: Normal range of motion. Cervical back: Normal range of motion and neck supple. Skin:     General: Skin is warm. Findings: No rash. Neurological:      Mental Status: She is alert and oriented to person, place, and time. Psychiatric:         Behavior: Behavior normal.               DIAGNOSTIC RESULTS     EKG: All EKG's are interpreted by the Emergency Department Physician who either signs or Co-signs this chart in the absence of a cardiologist.        RADIOLOGY:   Non-plain film images such as CT, Ultrasound and MRI are read by the radiologist. Plain radiographic images arevisualized and preliminarily interpreted by the emergency physician with the below findings:        Interpretation per the Radiologist below, if available at thetime of this note:          ED BEDSIDE ULTRASOUND:   Performed by ED Physician - none    LABS:  Labs Reviewed   CBC WITH AUTO DIFFERENTIAL - Abnormal; Notable for the following components:       Result Value    WBC 13.7 (*)     RBC 6.31 (*)     Hemoglobin 17.6 (*)     Hematocrit 53.2 (*)     RDW 14.6 (*)     Immature Granulocytes 1 (*)     Segs Absolute 9.14 (*)     All other components within normal limits   COMPREHENSIVE METABOLIC PANEL - Abnormal; Notable for the following components:    Glucose 268 (*)     BUN 40 (*)     Creatinine 1.42 (*)     Est, Glom Filt Rate 42 (*)     Bun/Cre Ratio 28 (*)     Sodium 134 (*)     Potassium 2.8 (*)     Chloride 95 (*)     Alkaline Phosphatase 125 (*)     All other components within normal limits   LIPASE   URINALYSIS WITH REFLEX TO CULTURE   MAGNESIUM       All other labs were within normal range or not returned as of this dictation.     EMERGENCY DEPARTMENT COURSE and DIFFERENTIAL DIAGNOSIS/MDM:   Vitals:    Vitals:    02/01/23 1457 02/01/23 1609 02/01/23 1838   BP: (!) 182/98 125/73 (!) 146/66   Pulse: 100  83   Resp: 16  18   Temp: 97.6 °F (36.4 °C)     TempSrc: Oral     SpO2: 97% 96% 97%   Weight: 234 lb (106.1 kg) Height: 5' 7\" (1.702 m)       HEARTSCORE:n/a    60-year-old female reports history of diverticulitis in the past.  Reports some nausea vomiting over the last few days but the vomiting has improved. 1 episode of diarrhea today. CT scan was negative for any acute process. Patient reevaluated after scan was reported negative. She reports some pain. Patient given a second pain medication and symptoms have resolved also given 2 rounds of IV fluids totaling of 2 L. No chest pain shortness of breath. Reports events improvement. Requesting discharge home. Does not wish to be admitted. States she needs to check on her  who is ill. Potassium replaced and patient tolerating by mouth    Evaluation and treatment course in the ED, and plan of care upon discharge was discussed in length with the patient. Patient had no further questions prior to being discharged and was instructed to return to the ED for new or worsening symptoms. DDx include diverticulitis DECLAN acute electrolyte imbalance small bowel obstruction          The patient was involved in his/her plan of care. The testing that was ordered was discussed with the patient. Any medications that may have been ordered were discussed with the patient. I have reviewed the patient's previous medical records using the electronic health record that we have available. Labs and imaging were reviewed. Care was provided during an unprecedented national emergency due to the novel coronavirus, Covid-19. CONSULTS:  None    PROCEDURES:  Procedures        FINAL IMPRESSION      1. Dehydration    2. Nausea vomiting and diarrhea    3.  Hypokalemia          DISPOSITION/PLAN   DISPOSITION Decision To Discharge 02/01/2023 07:34:29 PM      PATIENTREFERRED TO:   Rochelle Kennedy DO  [address]  [address] [address]  [phone]    In 3 days      DISCHARGE MEDICATIONS:     New Prescriptions    DICYCLOMINE (BENTYL) 20 MG TABLET    Take 1 tablet by mouth 4 times daily for 7 days    ONDANSETRON (ZOFRAN-ODT) 4 MG DISINTEGRATING TABLET    Place 1 tablet under the tongue every 8 hours as needed for Nausea or Vomiting    POTASSIUM CHLORIDE (KLOR-CON M) 20 MEQ EXTENDED RELEASE TABLET    Take 1 tablet by mouth 2 times daily for 4 days           (Please note that portions of this note were completed with a voice recognition program.  Efforts were made to edit thedictations but occasionally words are mis-transcribed.)    LUIS Villalobos PA-C  02/01/23 1950

## 2023-02-02 NOTE — DISCHARGE INSTRUCTIONS
Take meds as prescribed. Follow up with doctor  in 1-2 days.   Return to ER immediately if symptoms worsen or persist.  Ask doctor to recheck your potassium when you follow up

## 2023-02-08 ENCOUNTER — TELEPHONE (OUTPATIENT)
Dept: FAMILY MEDICINE CLINIC | Age: 63
End: 2023-02-08

## 2023-02-08 NOTE — TELEPHONE ENCOUNTER
I understand her situation and sympathize with her, but as we've discussed previously, there is nothing stronger that I can put her on nor can I increase the Tramadol and we can't keep switching the chronic pain medication.  She needs to stay on her Tramadol at this current dose or I would advise we have her see pain management so they can increase her medication if that is what they think she needs

## 2023-02-08 NOTE — TELEPHONE ENCOUNTER
Patient  is in the hospital is critical condition. She states she has to do a lot of walking and is also working again. She is in a lot of pain for all the walking and is asking if you can switch her pain meds back to percocet. Or something strong then the Tramadol. The tramadol isn't helping anymore. Please advise.

## 2023-02-09 DIAGNOSIS — M79.7 FIBROMYALGIA: ICD-10-CM

## 2023-02-09 DIAGNOSIS — E11.9 TYPE 2 DIABETES MELLITUS WITHOUT COMPLICATION, WITH LONG-TERM CURRENT USE OF INSULIN (HCC): ICD-10-CM

## 2023-02-09 DIAGNOSIS — Z79.4 TYPE 2 DIABETES MELLITUS WITHOUT COMPLICATION, WITH LONG-TERM CURRENT USE OF INSULIN (HCC): ICD-10-CM

## 2023-02-09 RX ORDER — TRAMADOL HYDROCHLORIDE 50 MG/1
100 TABLET ORAL EVERY 8 HOURS PRN
Qty: 180 TABLET | Refills: 0 | Status: SHIPPED | OUTPATIENT
Start: 2023-02-15 | End: 2023-03-17

## 2023-02-09 RX ORDER — INSULIN GLARGINE 100 [IU]/ML
20 INJECTION, SOLUTION SUBCUTANEOUS 2 TIMES DAILY
Qty: 5 ADJUSTABLE DOSE PRE-FILLED PEN SYRINGE | Refills: 2 | Status: SHIPPED | OUTPATIENT
Start: 2023-02-09

## 2023-02-09 NOTE — TELEPHONE ENCOUNTER
Virgen Mahoney is calling to request a refill on the following medication(s):    Last Visit Date (If Applicable):  89/43/2298    Next Visit Date:    2/16/2023    Medication Request:  Requested Prescriptions     Pending Prescriptions Disp Refills    blood glucose test strips (ASCENSIA AUTODISC VI;ONE TOUCH ULTRA TEST VI) strip 150 strip 5     Sig: Use with associated glucose meter. Use 4-5q day    traMADol (ULTRAM) 50 MG tablet 180 tablet 0     Sig: Take 2 tablets by mouth every 8 hours as needed for Pain for up to 30 days.     insulin glargine (LANTUS SOLOSTAR) 100 UNIT/ML injection pen 5 Adjustable Dose Pre-filled Pen Syringe 2     Sig: Inject 20 Units into the skin 2 times daily

## 2023-03-10 DIAGNOSIS — K21.9 GASTROESOPHAGEAL REFLUX DISEASE, UNSPECIFIED WHETHER ESOPHAGITIS PRESENT: ICD-10-CM

## 2023-03-10 DIAGNOSIS — M79.7 FIBROMYALGIA: ICD-10-CM

## 2023-03-10 RX ORDER — TRAMADOL HYDROCHLORIDE 50 MG/1
TABLET ORAL
Qty: 180 TABLET | OUTPATIENT
Start: 2023-03-10

## 2023-03-10 NOTE — TELEPHONE ENCOUNTER
Patient will pick medications up on 03/12/23 from Rachelle Lugo is calling to request a refill on the following medication(s):    Medication Request:  Requested Prescriptions     Pending Prescriptions Disp Refills    traMADol (ULTRAM) 50 MG tablet 180 tablet 0     Sig: Take 2 tablets by mouth every 8 hours as needed for Pain for up to 30 days.     omeprazole (PRILOSEC) 40 MG delayed release capsule 30 capsule 5     Sig: Take 1 capsule by mouth every morning (before breakfast)       Last Visit Date (If Applicable):  92/07/5351    Next Visit Date:    4/13/2023

## 2023-03-11 RX ORDER — TRAMADOL HYDROCHLORIDE 50 MG/1
100 TABLET ORAL EVERY 8 HOURS PRN
Qty: 180 TABLET | Refills: 0 | Status: SHIPPED | OUTPATIENT
Start: 2023-03-15 | End: 2023-04-14

## 2023-03-11 RX ORDER — OMEPRAZOLE 40 MG/1
40 CAPSULE, DELAYED RELEASE ORAL
Qty: 30 CAPSULE | Refills: 5 | Status: SHIPPED | OUTPATIENT
Start: 2023-03-11

## 2023-03-13 RX ORDER — TRAMADOL HYDROCHLORIDE 50 MG/1
TABLET ORAL
Qty: 180 TABLET | OUTPATIENT
Start: 2023-03-13

## 2023-03-31 DIAGNOSIS — E11.9 TYPE 2 DIABETES MELLITUS WITHOUT COMPLICATION, WITH LONG-TERM CURRENT USE OF INSULIN (HCC): ICD-10-CM

## 2023-03-31 DIAGNOSIS — Z79.4 TYPE 2 DIABETES MELLITUS WITHOUT COMPLICATION, WITH LONG-TERM CURRENT USE OF INSULIN (HCC): ICD-10-CM

## 2023-03-31 NOTE — TELEPHONE ENCOUNTER
Petra Chan is calling to request a refill on the following medication(s):    Medication Request:  Requested Prescriptions     Pending Prescriptions Disp Refills    blood glucose test strips (ASCENSIA AUTODISC VI;ONE TOUCH ULTRA TEST VI) strip 150 strip 5     Sig: Use with associated glucose meter.  Use 4-5q day       Last Visit Date (If Applicable):  99/40/7106    Next Visit Date:    4/13/2023

## 2023-05-09 ENCOUNTER — TELEPHONE (OUTPATIENT)
Dept: FAMILY MEDICINE CLINIC | Age: 63
End: 2023-05-09

## 2023-05-11 ENCOUNTER — NURSE ONLY (OUTPATIENT)
Dept: FAMILY MEDICINE CLINIC | Age: 63
End: 2023-05-11
Payer: MEDICAID

## 2023-05-11 DIAGNOSIS — M79.7 FIBROMYALGIA: ICD-10-CM

## 2023-05-11 DIAGNOSIS — M15.9 PRIMARY OSTEOARTHRITIS INVOLVING MULTIPLE JOINTS: ICD-10-CM

## 2023-05-11 DIAGNOSIS — M51.36 DDD (DEGENERATIVE DISC DISEASE), LUMBAR: Primary | ICD-10-CM

## 2023-05-11 DIAGNOSIS — M51.36 DDD (DEGENERATIVE DISC DISEASE), LUMBAR: ICD-10-CM

## 2023-05-11 PROCEDURE — 96372 THER/PROPH/DIAG INJ SC/IM: CPT | Performed by: FAMILY MEDICINE

## 2023-05-11 RX ORDER — KETOROLAC TROMETHAMINE 30 MG/ML
60 INJECTION, SOLUTION INTRAMUSCULAR; INTRAVENOUS ONCE
Status: DISCONTINUED | OUTPATIENT
Start: 2023-05-11 | End: 2023-05-11

## 2023-05-11 RX ORDER — KETOROLAC TROMETHAMINE 30 MG/ML
60 INJECTION, SOLUTION INTRAMUSCULAR; INTRAVENOUS ONCE
Status: COMPLETED | OUTPATIENT
Start: 2023-05-11 | End: 2023-05-11

## 2023-05-11 RX ORDER — OXYCODONE HYDROCHLORIDE AND ACETAMINOPHEN 5; 325 MG/1; MG/1
1 TABLET ORAL EVERY 6 HOURS PRN
Qty: 120 TABLET | Refills: 0 | Status: SHIPPED | OUTPATIENT
Start: 2023-05-12 | End: 2023-06-11

## 2023-05-11 RX ADMIN — KETOROLAC TROMETHAMINE 60 MG: 30 INJECTION, SOLUTION INTRAMUSCULAR; INTRAVENOUS at 14:45

## 2023-06-01 ENCOUNTER — TELEPHONE (OUTPATIENT)
Dept: FAMILY MEDICINE CLINIC | Age: 63
End: 2023-06-01

## 2023-06-01 DIAGNOSIS — Z79.4 TYPE 2 DIABETES MELLITUS WITHOUT COMPLICATION, WITH LONG-TERM CURRENT USE OF INSULIN (HCC): ICD-10-CM

## 2023-06-01 DIAGNOSIS — E11.9 TYPE 2 DIABETES MELLITUS WITHOUT COMPLICATION, WITH LONG-TERM CURRENT USE OF INSULIN (HCC): ICD-10-CM

## 2023-06-01 DIAGNOSIS — E53.8 VITAMIN B12 DEFICIENCY: Primary | ICD-10-CM

## 2023-06-01 NOTE — TELEPHONE ENCOUNTER
Patient calling for b-12 shot. Please advise. She states we usually do these for her.  The last one I can see is 01/2023

## 2023-06-01 NOTE — TELEPHONE ENCOUNTER
I want to check her Vitamin B12 level with bloodwork before advising another injection.  I've ordered the test now (along with an A1c) so she can get that done as soon as possible (doesn't have to be fasting) and then I can advise

## 2023-06-09 DIAGNOSIS — M51.36 DDD (DEGENERATIVE DISC DISEASE), LUMBAR: ICD-10-CM

## 2023-06-09 DIAGNOSIS — I10 ESSENTIAL (PRIMARY) HYPERTENSION: ICD-10-CM

## 2023-06-09 DIAGNOSIS — M79.7 FIBROMYALGIA: ICD-10-CM

## 2023-06-09 DIAGNOSIS — M15.9 PRIMARY OSTEOARTHRITIS INVOLVING MULTIPLE JOINTS: ICD-10-CM

## 2023-06-09 RX ORDER — OXYCODONE HYDROCHLORIDE AND ACETAMINOPHEN 5; 325 MG/1; MG/1
1 TABLET ORAL EVERY 6 HOURS PRN
Qty: 120 TABLET | Refills: 0 | Status: SHIPPED | OUTPATIENT
Start: 2023-06-11 | End: 2023-07-11

## 2023-06-09 RX ORDER — METOPROLOL TARTRATE 50 MG/1
50 TABLET, FILM COATED ORAL DAILY
Qty: 60 TABLET | Refills: 5 | Status: SHIPPED | OUTPATIENT
Start: 2023-06-09

## 2023-06-09 RX ORDER — LISINOPRIL AND HYDROCHLOROTHIAZIDE 20; 12.5 MG/1; MG/1
1 TABLET ORAL DAILY
Qty: 30 TABLET | Refills: 5 | Status: SHIPPED | OUTPATIENT
Start: 2023-06-09

## 2023-06-09 NOTE — TELEPHONE ENCOUNTER
Trinity Wen is calling to request a refill on the following medication(s):    Medication Request:  Requested Prescriptions     Pending Prescriptions Disp Refills    lisinopril-hydroCHLOROthiazide (PRINZIDE;ZESTORETIC) 20-12.5 MG per tablet 30 tablet 5     Sig: Take 1 tablet by mouth daily    metoprolol tartrate (LOPRESSOR) 50 MG tablet 60 tablet 5     Sig: Take 1 tablet by mouth daily    oxyCODONE-acetaminophen (PERCOCET) 5-325 MG per tablet 120 tablet 0     Sig: Take 1 tablet by mouth every 6 hours as needed for Pain for up to 30 days.  Take lowest dose possible to manage pain       Last Visit Date (If Applicable):  81/09/7908    Next Visit Date:    7/18/2023

## 2023-07-11 DIAGNOSIS — M79.7 FIBROMYALGIA: ICD-10-CM

## 2023-07-11 DIAGNOSIS — M15.9 PRIMARY OSTEOARTHRITIS INVOLVING MULTIPLE JOINTS: ICD-10-CM

## 2023-07-11 DIAGNOSIS — M51.36 DDD (DEGENERATIVE DISC DISEASE), LUMBAR: ICD-10-CM

## 2023-07-11 RX ORDER — OXYCODONE HYDROCHLORIDE AND ACETAMINOPHEN 5; 325 MG/1; MG/1
1 TABLET ORAL EVERY 6 HOURS PRN
Qty: 120 TABLET | Refills: 0 | Status: SHIPPED | OUTPATIENT
Start: 2023-07-12 | End: 2023-08-11

## 2023-07-11 NOTE — TELEPHONE ENCOUNTER
Trinidad Markham is calling to request a refill on the following medication(s):    Medication Request:  Requested Prescriptions     Pending Prescriptions Disp Refills    oxyCODONE-acetaminophen (PERCOCET) 5-325 MG per tablet 120 tablet 0     Sig: Take 1 tablet by mouth every 6 hours as needed for Pain for up to 30 days.  Take lowest dose possible to manage pain       Last Visit Date (If Applicable):  18/01/9735    Next Visit Date:    7/18/2023

## 2023-08-10 DIAGNOSIS — M51.36 DDD (DEGENERATIVE DISC DISEASE), LUMBAR: ICD-10-CM

## 2023-08-10 DIAGNOSIS — M79.7 FIBROMYALGIA: ICD-10-CM

## 2023-08-10 DIAGNOSIS — J30.2 SEASONAL ALLERGIC RHINITIS, UNSPECIFIED TRIGGER: ICD-10-CM

## 2023-08-10 DIAGNOSIS — M15.9 PRIMARY OSTEOARTHRITIS INVOLVING MULTIPLE JOINTS: ICD-10-CM

## 2023-08-10 NOTE — TELEPHONE ENCOUNTER
Trinidad Markham is calling to request a refill on the following medication(s):    Medication Request:  Requested Prescriptions     Pending Prescriptions Disp Refills    fluticasone (FLONASE) 50 MCG/ACT nasal spray 1 each 5     Sig: SPRAY TWO SPRAYS IN EACH NOSTRIL ONCE DAILY    oxyCODONE-acetaminophen (PERCOCET) 5-325 MG per tablet 120 tablet 0     Sig: Take 1 tablet by mouth every 6 hours as needed for Pain for up to 30 days.  Take lowest dose possible to manage pain       Last Visit Date (If Applicable):  35/18/5188    Next Visit Date:    9/26/2023

## 2023-08-11 DIAGNOSIS — M15.9 PRIMARY OSTEOARTHRITIS INVOLVING MULTIPLE JOINTS: ICD-10-CM

## 2023-08-11 DIAGNOSIS — E11.9 TYPE 2 DIABETES MELLITUS WITHOUT COMPLICATION, WITH LONG-TERM CURRENT USE OF INSULIN (HCC): ICD-10-CM

## 2023-08-11 DIAGNOSIS — M79.7 FIBROMYALGIA: ICD-10-CM

## 2023-08-11 DIAGNOSIS — M51.36 DDD (DEGENERATIVE DISC DISEASE), LUMBAR: ICD-10-CM

## 2023-08-11 DIAGNOSIS — Z79.4 TYPE 2 DIABETES MELLITUS WITHOUT COMPLICATION, WITH LONG-TERM CURRENT USE OF INSULIN (HCC): ICD-10-CM

## 2023-08-11 NOTE — TELEPHONE ENCOUNTER
Keila Diaz is calling to request a refill on the following medication(s):    Medication Request:  Requested Prescriptions     Pending Prescriptions Disp Refills    insulin glargine (LANTUS SOLOSTAR) 100 UNIT/ML injection pen 5 Adjustable Dose Pre-filled Pen Syringe 2     Sig: Inject 20 Units into the skin 2 times daily    oxyCODONE-acetaminophen (PERCOCET) 5-325 MG per tablet 120 tablet 0     Sig: Take 1 tablet by mouth every 6 hours as needed for Pain for up to 30 days.  Take lowest dose possible to manage pain       Last Visit Date (If Applicable):  93/48/6788    Next Visit Date:    9/26/2023 Performing Laboratory: -[id number] Billing Type: Third-Party Bill Expected Date Of Service: 11/28/2018 Lab Facility: [id number] Bill For Surgical Tray: no

## 2023-08-14 RX ORDER — OXYCODONE HYDROCHLORIDE AND ACETAMINOPHEN 5; 325 MG/1; MG/1
1 TABLET ORAL EVERY 6 HOURS PRN
Qty: 120 TABLET | Refills: 0 | Status: SHIPPED | OUTPATIENT
Start: 2023-08-14 | End: 2023-09-13

## 2023-08-14 RX ORDER — INSULIN GLARGINE 100 [IU]/ML
20 INJECTION, SOLUTION SUBCUTANEOUS 2 TIMES DAILY
Qty: 5 ADJUSTABLE DOSE PRE-FILLED PEN SYRINGE | Refills: 2 | Status: SHIPPED | OUTPATIENT
Start: 2023-08-14

## 2023-08-14 RX ORDER — OXYCODONE HYDROCHLORIDE AND ACETAMINOPHEN 5; 325 MG/1; MG/1
1 TABLET ORAL EVERY 6 HOURS PRN
Qty: 120 TABLET | Refills: 0 | OUTPATIENT
Start: 2023-08-14 | End: 2023-09-13

## 2023-08-14 RX ORDER — FLUTICASONE PROPIONATE 50 MCG
SPRAY, SUSPENSION (ML) NASAL
Qty: 1 EACH | Refills: 5 | Status: SHIPPED | OUTPATIENT
Start: 2023-08-14

## 2023-12-01 ENCOUNTER — OFFICE VISIT (OUTPATIENT)
Dept: FAMILY MEDICINE CLINIC | Age: 63
End: 2023-12-01
Payer: MEDICAID

## 2023-12-01 VITALS
HEART RATE: 103 BPM | WEIGHT: 260 LBS | SYSTOLIC BLOOD PRESSURE: 152 MMHG | OXYGEN SATURATION: 95 % | BODY MASS INDEX: 40.72 KG/M2 | DIASTOLIC BLOOD PRESSURE: 88 MMHG

## 2023-12-01 DIAGNOSIS — F33.2 MAJOR DEPRESSIVE DISORDER, RECURRENT EPISODE, SEVERE WITH MIXED FEATURES (HCC): ICD-10-CM

## 2023-12-01 DIAGNOSIS — Z79.4 TYPE 2 DIABETES MELLITUS WITHOUT COMPLICATION, WITH LONG-TERM CURRENT USE OF INSULIN (HCC): ICD-10-CM

## 2023-12-01 DIAGNOSIS — E53.8 VITAMIN B12 DEFICIENCY: Primary | ICD-10-CM

## 2023-12-01 DIAGNOSIS — G89.29 CHRONIC PAIN OF LEFT KNEE: ICD-10-CM

## 2023-12-01 DIAGNOSIS — M48.062 LUMBAR STENOSIS WITH NEUROGENIC CLAUDICATION: ICD-10-CM

## 2023-12-01 DIAGNOSIS — K21.9 GASTROESOPHAGEAL REFLUX DISEASE, UNSPECIFIED WHETHER ESOPHAGITIS PRESENT: ICD-10-CM

## 2023-12-01 DIAGNOSIS — M79.7 FIBROMYALGIA: ICD-10-CM

## 2023-12-01 DIAGNOSIS — L03.115 CELLULITIS OF RIGHT LOWER EXTREMITY: ICD-10-CM

## 2023-12-01 DIAGNOSIS — J30.2 SEASONAL ALLERGIC RHINITIS, UNSPECIFIED TRIGGER: ICD-10-CM

## 2023-12-01 DIAGNOSIS — M25.562 CHRONIC PAIN OF LEFT KNEE: ICD-10-CM

## 2023-12-01 DIAGNOSIS — I10 ESSENTIAL (PRIMARY) HYPERTENSION: ICD-10-CM

## 2023-12-01 DIAGNOSIS — E55.9 VITAMIN D DEFICIENCY: ICD-10-CM

## 2023-12-01 DIAGNOSIS — E11.9 TYPE 2 DIABETES MELLITUS WITHOUT COMPLICATION, WITH LONG-TERM CURRENT USE OF INSULIN (HCC): ICD-10-CM

## 2023-12-01 DIAGNOSIS — E78.5 DYSLIPIDEMIA: ICD-10-CM

## 2023-12-01 DIAGNOSIS — R60.0 BILATERAL LOWER EXTREMITY EDEMA: ICD-10-CM

## 2023-12-01 PROCEDURE — 99214 OFFICE O/P EST MOD 30 MIN: CPT | Performed by: FAMILY MEDICINE

## 2023-12-01 PROCEDURE — 3078F DIAST BP <80 MM HG: CPT | Performed by: FAMILY MEDICINE

## 2023-12-01 PROCEDURE — 3074F SYST BP LT 130 MM HG: CPT | Performed by: FAMILY MEDICINE

## 2023-12-01 RX ORDER — INSULIN GLARGINE 100 [IU]/ML
20 INJECTION, SOLUTION SUBCUTANEOUS 2 TIMES DAILY
Qty: 5 ADJUSTABLE DOSE PRE-FILLED PEN SYRINGE | Refills: 2 | Status: SHIPPED | OUTPATIENT
Start: 2023-12-01

## 2023-12-01 RX ORDER — OMEPRAZOLE 40 MG/1
40 CAPSULE, DELAYED RELEASE ORAL
Qty: 90 CAPSULE | Refills: 1 | Status: SHIPPED | OUTPATIENT
Start: 2023-12-01

## 2023-12-01 RX ORDER — TRAMADOL HYDROCHLORIDE 50 MG/1
100 TABLET ORAL EVERY 8 HOURS PRN
Qty: 180 TABLET | Refills: 0 | Status: SHIPPED | OUTPATIENT
Start: 2023-12-01 | End: 2023-12-31

## 2023-12-01 RX ORDER — LISINOPRIL AND HYDROCHLOROTHIAZIDE 20; 12.5 MG/1; MG/1
1 TABLET ORAL DAILY
Qty: 90 TABLET | Refills: 3 | Status: SHIPPED | OUTPATIENT
Start: 2023-12-01

## 2023-12-01 RX ORDER — FUROSEMIDE 20 MG/1
20 TABLET ORAL DAILY
Qty: 90 TABLET | Refills: 1 | Status: SHIPPED | OUTPATIENT
Start: 2023-12-01

## 2023-12-01 RX ORDER — LANCETS 30 GAUGE
EACH MISCELLANEOUS
Qty: 100 EACH | Refills: 5 | Status: SHIPPED | OUTPATIENT
Start: 2023-12-01

## 2023-12-01 RX ORDER — POTASSIUM CHLORIDE 20 MEQ/1
20 TABLET, EXTENDED RELEASE ORAL DAILY
Qty: 90 TABLET | Refills: 1 | Status: SHIPPED | OUTPATIENT
Start: 2023-12-01 | End: 2024-05-29

## 2023-12-01 RX ORDER — IBUPROFEN 600 MG/1
600 TABLET ORAL EVERY 8 HOURS PRN
Qty: 360 TABLET | Refills: 1 | Status: SHIPPED | OUTPATIENT
Start: 2023-12-01

## 2023-12-01 RX ORDER — FLUTICASONE PROPIONATE 50 MCG
SPRAY, SUSPENSION (ML) NASAL
Qty: 3 EACH | Refills: 1 | Status: SHIPPED | OUTPATIENT
Start: 2023-12-01

## 2023-12-01 RX ORDER — METOPROLOL TARTRATE 50 MG/1
50 TABLET, FILM COATED ORAL DAILY
Qty: 90 TABLET | Refills: 3 | Status: SHIPPED | OUTPATIENT
Start: 2023-12-01

## 2023-12-01 NOTE — PATIENT INSTRUCTIONS
serving is 1 slice of bread, 1 ounce of dry cereal, or 1/2 cup of cooked rice, pasta, or cooked cereal. Try to choose whole-grain products as much as possible. Limit lean meat, poultry, and fish to 6 ounces or less each day. One egg counts as 1 ounce. Eat 4 to 5 servings of nuts, seeds, and legumes (cooked dried beans, lentils, and split peas) each week. A serving is 1/3 cup of nuts, 2 tablespoons of seeds, 2 tablespoons of peanut butter, or 1/2 cup of cooked beans or peas. Limit fats and oils to 2 to 3 servings each day. A serving is 1 teaspoon of vegetable oil or 2 tablespoons of salad dressing. Limit sweets and added sugars to 5 servings or less a week. A serving is 1 tablespoon jelly or jam, 1/2 cup sorbet, or 1 cup of lemonade. Eat less than 2,300 milligrams (mg) of sodium a day. If you limit your sodium to 1,500 mg a day, you can lower your blood pressure even more. Be aware that all of these are the suggested number of servings for people who eat 1,800 to 2,000 calories a day. Your recommended number of servings may be different if you need more or fewer calories. Tips for success  Start small. Make small changes, and stick with them. Once those changes become habit, add a few more changes. Try some of the following:  Make it a goal to eat a fruit or vegetable at every meal and at snacks. This will make it easy to get the recommended amount of fruits and vegetables each day. Try yogurt topped with fruit and nuts for a snack or healthy dessert. Add lettuce, tomato, cucumber, and onion to sandwiches. Have a variety of cut-up vegetables with a low-fat dip as an appetizer instead of chips and dip. Sprinkle sunflower seeds or chopped almonds over salads. Or try adding chopped walnuts or almonds to cooked vegetables. Try some vegetarian meals using beans and peas. Add garbanzo or kidney beans to salads. Make burritos and tacos with mashed keith beans or black beans. Where can you learn more?   Go to

## 2023-12-05 ASSESSMENT — ENCOUNTER SYMPTOMS
RESPIRATORY NEGATIVE: 1
EYES NEGATIVE: 1
BLURRED VISION: 0
GASTROINTESTINAL NEGATIVE: 1
SHORTNESS OF BREATH: 0
BOWEL INCONTINENCE: 0
ALLERGIC/IMMUNOLOGIC NEGATIVE: 1
BACK PAIN: 1
ORTHOPNEA: 0

## 2023-12-05 NOTE — PROGRESS NOTES
rash.   Neurological:      General: No focal deficit present. Mental Status: She is alert and oriented to person, place, and time. Mental status is at baseline. Cranial Nerves: No cranial nerve deficit. Sensory: No sensory deficit. Motor: No weakness. Coordination: Coordination normal.      Gait: Gait normal.      Deep Tendon Reflexes: Reflexes normal.   Psychiatric:         Mood and Affect: Mood normal.         Behavior: Behavior normal.         Thought Content: Thought content normal.         Judgment: Judgment normal.         Labs/Imaging/Diagnostics   Labs:  Hemoglobin A1C   Date Value Ref Range Status   03/04/2021 7.5 % Final       Imaging Last 24 Hours:  CT ABDOMEN PELVIS W IV CONTRAST Additional Contrast? None  Narrative: EXAMINATION:  CT OF THE ABDOMEN AND PELVIS WITH CONTRAST 2/1/2023 4:50 pm    TECHNIQUE:  CT of the abdomen and pelvis was performed with the administration of  intravenous contrast. Multiplanar reformatted images are provided for review. Automated exposure control, iterative reconstruction, and/or weight based  adjustment of the mA/kV was utilized to reduce the radiation dose to as low  as reasonably achievable. COMPARISON:  CT abdomen and pelvis 11/01/2022. HISTORY:  ORDERING SYSTEM PROVIDED HISTORY: pain vomiting diarrhea  TECHNOLOGIST PROVIDED HISTORY:  pain vomiting diarrhea    Decision Support Exception - unselect if not a suspected or confirmed  emergency medical condition->Emergency Medical Condition (MA)  Reason for Exam: Vomiting, diarrhea, mid abd pain since 1/29/23. H/O Hyst.    FINDINGS:  Lower Chest: Images through the lung bases demonstrate no acute process. Organs: The liver is unremarkable. Status post cholecystectomy. The biliary  tree is within normal postoperative limits. The pancreas, spleen, and left  adrenal gland are unremarkable.   3.7 cm heterogeneous right adrenal nodule  again seen with a focus of macroscopic fat again

## 2023-12-05 NOTE — ASSESSMENT & PLAN NOTE
Uncontrolled, continue current medications, continue current treatment plan, medication adherence emphasized, lifestyle modifications recommended and compliance encouraged and refills sent

## 2023-12-28 DIAGNOSIS — M79.7 FIBROMYALGIA: ICD-10-CM

## 2023-12-28 DIAGNOSIS — Z79.4 TYPE 2 DIABETES MELLITUS WITHOUT COMPLICATION, WITH LONG-TERM CURRENT USE OF INSULIN (HCC): ICD-10-CM

## 2023-12-28 DIAGNOSIS — E11.9 TYPE 2 DIABETES MELLITUS WITHOUT COMPLICATION, WITH LONG-TERM CURRENT USE OF INSULIN (HCC): ICD-10-CM

## 2023-12-28 RX ORDER — TRAMADOL HYDROCHLORIDE 50 MG/1
100 TABLET ORAL EVERY 8 HOURS PRN
Qty: 180 TABLET | Refills: 0 | Status: SHIPPED | OUTPATIENT
Start: 2023-12-31 | End: 2024-01-30

## 2023-12-28 RX ORDER — INSULIN GLARGINE 100 [IU]/ML
20 INJECTION, SOLUTION SUBCUTANEOUS 2 TIMES DAILY
Qty: 5 ADJUSTABLE DOSE PRE-FILLED PEN SYRINGE | Refills: 2 | Status: SHIPPED | OUTPATIENT
Start: 2023-12-28

## 2023-12-28 NOTE — TELEPHONE ENCOUNTER
Silas Allen is calling to request a refill on the following medication(s):    Medication Request:  Requested Prescriptions     Pending Prescriptions Disp Refills    insulin glargine (LANTUS SOLOSTAR) 100 UNIT/ML injection pen 5 Adjustable Dose Pre-filled Pen Syringe 2     Sig: Inject 20 Units into the skin 2 times daily    traMADol (ULTRAM) 50 MG tablet 180 tablet 0     Sig: Take 2 tablets by mouth every 8 hours as needed for Pain for up to 30 days.        Last Visit Date (If Applicable):  26/1/5099    Next Visit Date:    3/1/2024

## 2024-01-26 DIAGNOSIS — M79.7 FIBROMYALGIA: ICD-10-CM

## 2024-01-26 DIAGNOSIS — J30.2 SEASONAL ALLERGIC RHINITIS, UNSPECIFIED TRIGGER: ICD-10-CM

## 2024-01-26 RX ORDER — TRAMADOL HYDROCHLORIDE 50 MG/1
100 TABLET ORAL EVERY 8 HOURS PRN
Qty: 180 TABLET | Refills: 0 | Status: SHIPPED | OUTPATIENT
Start: 2024-01-27 | End: 2024-02-26

## 2024-01-26 RX ORDER — FLUTICASONE PROPIONATE 50 MCG
SPRAY, SUSPENSION (ML) NASAL
Qty: 3 EACH | Refills: 1 | Status: SHIPPED | OUTPATIENT
Start: 2024-01-26

## 2024-01-26 NOTE — TELEPHONE ENCOUNTER
Sabrina Johnson is calling to request a refill on the following medication(s):    Medication Request:  Requested Prescriptions     Pending Prescriptions Disp Refills    fluticasone (FLONASE) 50 MCG/ACT nasal spray 3 each 1     Sig: SPRAY TWO SPRAYS IN EACH NOSTRIL ONCE DAILY    traMADol (ULTRAM) 50 MG tablet 180 tablet 0     Sig: Take 2 tablets by mouth every 8 hours as needed for Pain for up to 30 days.       Last Visit Date (If Applicable):  12/1/2023    Next Visit Date:    3/1/2024

## 2024-02-21 DIAGNOSIS — M79.7 FIBROMYALGIA: ICD-10-CM

## 2024-02-21 DIAGNOSIS — J30.2 SEASONAL ALLERGIC RHINITIS, UNSPECIFIED TRIGGER: ICD-10-CM

## 2024-02-26 DIAGNOSIS — M79.7 FIBROMYALGIA: ICD-10-CM

## 2024-02-26 RX ORDER — TRAMADOL HYDROCHLORIDE 50 MG/1
100 TABLET ORAL EVERY 8 HOURS PRN
Qty: 180 TABLET | Refills: 0 | Status: SHIPPED | OUTPATIENT
Start: 2024-02-26 | End: 2024-03-27

## 2024-02-26 RX ORDER — FLUTICASONE PROPIONATE 50 MCG
SPRAY, SUSPENSION (ML) NASAL
Qty: 3 EACH | Refills: 1 | Status: SHIPPED | OUTPATIENT
Start: 2024-02-26

## 2024-02-27 RX ORDER — TRAMADOL HYDROCHLORIDE 50 MG/1
TABLET ORAL
Qty: 180 TABLET | OUTPATIENT
Start: 2024-02-27

## 2024-03-22 DIAGNOSIS — M79.7 FIBROMYALGIA: ICD-10-CM

## 2024-03-25 RX ORDER — TRAMADOL HYDROCHLORIDE 50 MG/1
100 TABLET ORAL EVERY 8 HOURS PRN
Qty: 180 TABLET | Refills: 0 | Status: SHIPPED | OUTPATIENT
Start: 2024-03-27 | End: 2024-04-26

## 2024-03-25 NOTE — TELEPHONE ENCOUNTER
Sabrina Johnson is calling to request a refill on the following medication(s):    Medication Request:  Requested Prescriptions     Pending Prescriptions Disp Refills    traMADol (ULTRAM) 50 MG tablet [Pharmacy Med Name: traMADol HCL 50MG TABLET] 180 tablet      Sig: TAKE TWO TABLETS BY MOUTH EVERY 8 HOURS AS NEEDED FOR PAIN       Last Visit Date (If Applicable):  12/01/2023    Next Visit Date:    04/05/2024

## 2024-04-05 ENCOUNTER — OFFICE VISIT (OUTPATIENT)
Dept: FAMILY MEDICINE CLINIC | Age: 64
End: 2024-04-05
Payer: MEDICAID

## 2024-04-05 VITALS
DIASTOLIC BLOOD PRESSURE: 100 MMHG | WEIGHT: 266 LBS | BODY MASS INDEX: 41.66 KG/M2 | HEART RATE: 74 BPM | OXYGEN SATURATION: 99 % | SYSTOLIC BLOOD PRESSURE: 154 MMHG

## 2024-04-05 DIAGNOSIS — F33.2 MAJOR DEPRESSIVE DISORDER, RECURRENT EPISODE, SEVERE WITH MIXED FEATURES (HCC): ICD-10-CM

## 2024-04-05 DIAGNOSIS — E11.9 TYPE 2 DIABETES MELLITUS WITHOUT COMPLICATION, WITH LONG-TERM CURRENT USE OF INSULIN (HCC): Primary | ICD-10-CM

## 2024-04-05 DIAGNOSIS — G89.29 CHRONIC BILATERAL LOW BACK PAIN WITH BILATERAL SCIATICA: ICD-10-CM

## 2024-04-05 DIAGNOSIS — E53.8 VITAMIN B12 DEFICIENCY: ICD-10-CM

## 2024-04-05 DIAGNOSIS — M54.41 CHRONIC BILATERAL LOW BACK PAIN WITH BILATERAL SCIATICA: ICD-10-CM

## 2024-04-05 DIAGNOSIS — Z79.4 TYPE 2 DIABETES MELLITUS WITHOUT COMPLICATION, WITH LONG-TERM CURRENT USE OF INSULIN (HCC): Primary | ICD-10-CM

## 2024-04-05 DIAGNOSIS — I10 ESSENTIAL (PRIMARY) HYPERTENSION: ICD-10-CM

## 2024-04-05 DIAGNOSIS — M15.9 PRIMARY OSTEOARTHRITIS INVOLVING MULTIPLE JOINTS: ICD-10-CM

## 2024-04-05 DIAGNOSIS — M51.36 DDD (DEGENERATIVE DISC DISEASE), LUMBAR: ICD-10-CM

## 2024-04-05 DIAGNOSIS — M79.7 FIBROMYALGIA: ICD-10-CM

## 2024-04-05 DIAGNOSIS — M54.42 CHRONIC BILATERAL LOW BACK PAIN WITH BILATERAL SCIATICA: ICD-10-CM

## 2024-04-05 PROBLEM — F11.20 OPIOID DEPENDENCE WITH CURRENT USE (HCC): Status: ACTIVE | Noted: 2024-04-05

## 2024-04-05 PROCEDURE — 99214 OFFICE O/P EST MOD 30 MIN: CPT | Performed by: FAMILY MEDICINE

## 2024-04-05 PROCEDURE — 3077F SYST BP >= 140 MM HG: CPT | Performed by: FAMILY MEDICINE

## 2024-04-05 PROCEDURE — 83036 HEMOGLOBIN GLYCOSYLATED A1C: CPT | Performed by: FAMILY MEDICINE

## 2024-04-05 PROCEDURE — 3079F DIAST BP 80-89 MM HG: CPT | Performed by: FAMILY MEDICINE

## 2024-04-05 RX ORDER — OXYCODONE HYDROCHLORIDE AND ACETAMINOPHEN 5; 325 MG/1; MG/1
1 TABLET ORAL EVERY 6 HOURS PRN
Qty: 120 TABLET | Refills: 0 | Status: SHIPPED | OUTPATIENT
Start: 2024-04-26 | End: 2024-05-26

## 2024-04-05 RX ORDER — LISINOPRIL AND HYDROCHLOROTHIAZIDE 20; 12.5 MG/1; MG/1
2 TABLET ORAL DAILY
Qty: 180 TABLET | Refills: 3 | Status: SHIPPED | OUTPATIENT
Start: 2024-04-05

## 2024-04-05 RX ORDER — CYANOCOBALAMIN 1000 UG/ML
1000 INJECTION, SOLUTION INTRAMUSCULAR; SUBCUTANEOUS ONCE
Status: SHIPPED | OUTPATIENT
Start: 2024-04-05

## 2024-04-05 SDOH — ECONOMIC STABILITY: FOOD INSECURITY: WITHIN THE PAST 12 MONTHS, THE FOOD YOU BOUGHT JUST DIDN'T LAST AND YOU DIDN'T HAVE MONEY TO GET MORE.: NEVER TRUE

## 2024-04-05 SDOH — ECONOMIC STABILITY: FOOD INSECURITY: WITHIN THE PAST 12 MONTHS, YOU WORRIED THAT YOUR FOOD WOULD RUN OUT BEFORE YOU GOT MONEY TO BUY MORE.: NEVER TRUE

## 2024-04-05 SDOH — ECONOMIC STABILITY: HOUSING INSECURITY
IN THE LAST 12 MONTHS, WAS THERE A TIME WHEN YOU DID NOT HAVE A STEADY PLACE TO SLEEP OR SLEPT IN A SHELTER (INCLUDING NOW)?: NO

## 2024-04-05 SDOH — ECONOMIC STABILITY: INCOME INSECURITY: HOW HARD IS IT FOR YOU TO PAY FOR THE VERY BASICS LIKE FOOD, HOUSING, MEDICAL CARE, AND HEATING?: NOT HARD AT ALL

## 2024-04-05 ASSESSMENT — PATIENT HEALTH QUESTIONNAIRE - PHQ9
9. THOUGHTS THAT YOU WOULD BE BETTER OFF DEAD, OR OF HURTING YOURSELF: NOT AT ALL
3. TROUBLE FALLING OR STAYING ASLEEP: SEVERAL DAYS
6. FEELING BAD ABOUT YOURSELF - OR THAT YOU ARE A FAILURE OR HAVE LET YOURSELF OR YOUR FAMILY DOWN: NOT AT ALL
SUM OF ALL RESPONSES TO PHQ QUESTIONS 1-9: 5
SUM OF ALL RESPONSES TO PHQ QUESTIONS 1-9: 5
SUM OF ALL RESPONSES TO PHQ9 QUESTIONS 1 & 2: 3
7. TROUBLE CONCENTRATING ON THINGS, SUCH AS READING THE NEWSPAPER OR WATCHING TELEVISION: NOT AT ALL
SUM OF ALL RESPONSES TO PHQ QUESTIONS 1-9: 5
8. MOVING OR SPEAKING SO SLOWLY THAT OTHER PEOPLE COULD HAVE NOTICED. OR THE OPPOSITE, BEING SO FIGETY OR RESTLESS THAT YOU HAVE BEEN MOVING AROUND A LOT MORE THAN USUAL: NOT AT ALL
10. IF YOU CHECKED OFF ANY PROBLEMS, HOW DIFFICULT HAVE THESE PROBLEMS MADE IT FOR YOU TO DO YOUR WORK, TAKE CARE OF THINGS AT HOME, OR GET ALONG WITH OTHER PEOPLE: SOMEWHAT DIFFICULT
5. POOR APPETITE OR OVEREATING: NOT AT ALL
2. FEELING DOWN, DEPRESSED OR HOPELESS: NEARLY EVERY DAY
1. LITTLE INTEREST OR PLEASURE IN DOING THINGS: NOT AT ALL
SUM OF ALL RESPONSES TO PHQ QUESTIONS 1-9: 5
4. FEELING TIRED OR HAVING LITTLE ENERGY: SEVERAL DAYS

## 2024-04-05 NOTE — PROGRESS NOTES
[facility] Progress Note    Date:4/5/2024         Patient Name:Sabrina Johnson     YOB: 1960     Age:63 y.o.    Assessment/Plan        Problem List Items Addressed This Visit          Circulatory    Essential (primary) hypertension      Uncontrolled, changes made today: increase BP medication, medication adherence emphasized, and lifestyle modifications recommended         Relevant Medications    lisinopril-hydroCHLOROthiazide (PRINZIDE;ZESTORETIC) 20-12.5 MG per tablet       Endocrine    Type 2 diabetes mellitus without complication, with long-term current use of insulin (HCC) - Primary      Unclear control, continue current plan pending work up below, medication adherence emphasized, and lifestyle modifications recommended         Relevant Orders    POCT glycosylated hemoglobin (Hb A1C)       Nervous and Auditory    Chronic bilateral low back pain with bilateral sciatica      Uncontrolled, changes made today: long discussion about MEDD 30 and pain medication equivalents and that I cannot increase her pain medication above that threshold but can offer referral to pain management  She would like to switch from Tramadol to Percocet and if that doesn't help then will accept PM referral and establish care         Relevant Medications    oxyCODONE-acetaminophen (PERCOCET) 5-325 MG per tablet (Start on 4/26/2024)    Other Relevant Orders    Lorne Lee MD, Pain Management, [place]       Musculoskeletal and Integument    Primary osteoarthritis involving multiple joints     Uncontrolled, changes made today: switch back from Tramadol to Percocet - advised that I cannot prescribe over MEDD 30 no matter what medication she wants to try          Relevant Medications    oxyCODONE-acetaminophen (PERCOCET) 5-325 MG per tablet (Start on 4/26/2024)    DDD (degenerative disc disease), lumbar      Uncontrolled, changes made today: switch back from Tramadol to Percocet - advised that I cannot prescribe over MEDD

## 2024-04-05 NOTE — ASSESSMENT & PLAN NOTE
Uncontrolled, changes made today: increase BP medication, medication adherence emphasized, and lifestyle modifications recommended

## 2024-04-05 NOTE — PATIENT INSTRUCTIONS
[email or web address] and enter H967 to learn more about \"DASH Diet: Care Instructions.\"  Current as of: September 20, 2023               Content Version: 14.0  © 6791-4163 [employer].   Care instructions adapted under license by [employer]. If you have questions about a medical condition or this instruction, always ask your healthcare professional. [employer] disclaims any warranty or liability for your use of this information.

## 2024-04-05 NOTE — ASSESSMENT & PLAN NOTE
Uncontrolled, changes made today: long discussion about MEDD 30 and pain medication equivalents and that I cannot increase her pain medication above that threshold but can offer referral to pain management  She would like to switch from Tramadol to Percocet and if that doesn't help then will accept PM referral and establish care

## 2024-05-16 ENCOUNTER — NURSE ONLY (OUTPATIENT)
Dept: FAMILY MEDICINE CLINIC | Age: 64
End: 2024-05-16
Payer: MEDICAID

## 2024-05-16 DIAGNOSIS — E53.8 VITAMIN B12 DEFICIENCY: Primary | ICD-10-CM

## 2024-05-16 PROCEDURE — 99999 PR OFFICE/OUTPT VISIT,PROCEDURE ONLY: CPT | Performed by: FAMILY MEDICINE

## 2024-05-16 RX ORDER — CYANOCOBALAMIN 1000 UG/ML
1000 INJECTION, SOLUTION INTRAMUSCULAR; SUBCUTANEOUS ONCE
Status: COMPLETED | OUTPATIENT
Start: 2024-05-16 | End: 2024-05-17

## 2024-05-16 NOTE — PROGRESS NOTES
After obtaining consent, and per orders of Dr. Nuñez, injection of B12 given in Left deltoid by Ector Cool MA. Patient instructed to remain in clinic for 20 minutes afterwards, and to report any adverse reaction to me immediately.

## 2024-05-17 PROCEDURE — 96372 THER/PROPH/DIAG INJ SC/IM: CPT | Performed by: FAMILY MEDICINE

## 2024-05-17 RX ADMIN — CYANOCOBALAMIN 1000 MCG: 1000 INJECTION, SOLUTION INTRAMUSCULAR; SUBCUTANEOUS at 08:05

## 2024-05-24 DIAGNOSIS — J30.2 SEASONAL ALLERGIC RHINITIS, UNSPECIFIED TRIGGER: ICD-10-CM

## 2024-05-24 DIAGNOSIS — M15.9 PRIMARY OSTEOARTHRITIS INVOLVING MULTIPLE JOINTS: ICD-10-CM

## 2024-05-24 DIAGNOSIS — M51.36 DDD (DEGENERATIVE DISC DISEASE), LUMBAR: ICD-10-CM

## 2024-05-24 DIAGNOSIS — M79.7 FIBROMYALGIA: ICD-10-CM

## 2024-05-24 DIAGNOSIS — I10 ESSENTIAL (PRIMARY) HYPERTENSION: ICD-10-CM

## 2024-05-24 RX ORDER — OXYCODONE HYDROCHLORIDE AND ACETAMINOPHEN 5; 325 MG/1; MG/1
1 TABLET ORAL EVERY 6 HOURS PRN
Qty: 120 TABLET | Refills: 0 | Status: SHIPPED | OUTPATIENT
Start: 2024-05-26 | End: 2024-06-25

## 2024-05-24 RX ORDER — FLUTICASONE PROPIONATE 50 MCG
SPRAY, SUSPENSION (ML) NASAL
Qty: 3 EACH | Refills: 1 | Status: SHIPPED | OUTPATIENT
Start: 2024-05-24

## 2024-05-24 RX ORDER — METOPROLOL TARTRATE 50 MG/1
50 TABLET, FILM COATED ORAL DAILY
Qty: 90 TABLET | Refills: 3 | Status: SHIPPED | OUTPATIENT
Start: 2024-05-24

## 2024-05-24 NOTE — TELEPHONE ENCOUNTER
Please call in today so she can get them on Sunday    Nicko Johnson is calling to request a refill on the following medication(s):    Medication Request:  Requested Prescriptions     Pending Prescriptions Disp Refills    fluticasone (FLONASE) 50 MCG/ACT nasal spray 3 each 1     Sig: SPRAY TWO SPRAYS IN EACH NOSTRIL ONCE DAILY    oxyCODONE-acetaminophen (PERCOCET) 5-325 MG per tablet 120 tablet 0     Sig: Take 1 tablet by mouth every 6 hours as needed for Pain for up to 30 days. Take lowest dose possible to manage pain    metoprolol tartrate (LOPRESSOR) 50 MG tablet 90 tablet 3     Sig: Take 1 tablet by mouth daily       Last Visit Date (If Applicable):  4/5/2024    Next Visit Date:    6/3/2024

## 2024-06-10 DIAGNOSIS — I10 ESSENTIAL (PRIMARY) HYPERTENSION: ICD-10-CM

## 2024-06-10 DIAGNOSIS — K21.9 GASTROESOPHAGEAL REFLUX DISEASE, UNSPECIFIED WHETHER ESOPHAGITIS PRESENT: ICD-10-CM

## 2024-06-10 NOTE — TELEPHONE ENCOUNTER
Sabrina Johnson is calling to request a refill on the following medication(s):    Medication Request:  Requested Prescriptions     Pending Prescriptions Disp Refills    omeprazole (PRILOSEC) 40 MG delayed release capsule 90 capsule 1     Sig: Take 1 capsule by mouth every morning (before breakfast)       Last Visit Date (If Applicable):  4/5/2024    Next Visit Date:    7/12/2024

## 2024-06-11 RX ORDER — OMEPRAZOLE 40 MG/1
40 CAPSULE, DELAYED RELEASE ORAL
Qty: 90 CAPSULE | Refills: 1 | Status: SHIPPED | OUTPATIENT
Start: 2024-06-11

## 2024-06-25 DIAGNOSIS — M15.9 PRIMARY OSTEOARTHRITIS INVOLVING MULTIPLE JOINTS: ICD-10-CM

## 2024-06-25 DIAGNOSIS — M79.7 FIBROMYALGIA: ICD-10-CM

## 2024-06-25 DIAGNOSIS — M51.36 DDD (DEGENERATIVE DISC DISEASE), LUMBAR: ICD-10-CM

## 2024-06-25 DIAGNOSIS — E11.9 TYPE 2 DIABETES MELLITUS WITHOUT COMPLICATION, WITH LONG-TERM CURRENT USE OF INSULIN (HCC): ICD-10-CM

## 2024-06-25 DIAGNOSIS — Z79.4 TYPE 2 DIABETES MELLITUS WITHOUT COMPLICATION, WITH LONG-TERM CURRENT USE OF INSULIN (HCC): ICD-10-CM

## 2024-06-25 RX ORDER — OXYCODONE HYDROCHLORIDE AND ACETAMINOPHEN 5; 325 MG/1; MG/1
1 TABLET ORAL EVERY 6 HOURS PRN
Qty: 120 TABLET | Refills: 0 | Status: SHIPPED | OUTPATIENT
Start: 2024-06-25 | End: 2024-07-25

## 2024-06-25 RX ORDER — INSULIN GLARGINE 100 [IU]/ML
INJECTION, SOLUTION SUBCUTANEOUS
Qty: 15 ML | Refills: 5 | Status: SHIPPED | OUTPATIENT
Start: 2024-06-25

## 2024-07-22 ENCOUNTER — HOSPITAL ENCOUNTER (OUTPATIENT)
Age: 64
Setting detail: SPECIMEN
Discharge: HOME | End: 2024-07-22

## 2024-07-22 ENCOUNTER — OFFICE VISIT (OUTPATIENT)
Dept: FAMILY MEDICINE CLINIC | Age: 64
End: 2024-07-22
Payer: MEDICAID

## 2024-07-22 ENCOUNTER — TELEPHONE (OUTPATIENT)
Dept: FAMILY MEDICINE CLINIC | Age: 64
End: 2024-07-22

## 2024-07-22 VITALS — SYSTOLIC BLOOD PRESSURE: 132 MMHG | BODY MASS INDEX: 40.41 KG/M2 | DIASTOLIC BLOOD PRESSURE: 82 MMHG | WEIGHT: 258 LBS

## 2024-07-22 DIAGNOSIS — M15.9 PRIMARY OSTEOARTHRITIS INVOLVING MULTIPLE JOINTS: ICD-10-CM

## 2024-07-22 DIAGNOSIS — E78.5 DYSLIPIDEMIA: ICD-10-CM

## 2024-07-22 DIAGNOSIS — L03.316 CELLULITIS OF UMBILICUS: ICD-10-CM

## 2024-07-22 DIAGNOSIS — I10 ESSENTIAL (PRIMARY) HYPERTENSION: ICD-10-CM

## 2024-07-22 DIAGNOSIS — E11.9 TYPE 2 DIABETES MELLITUS WITHOUT COMPLICATION, WITH LONG-TERM CURRENT USE OF INSULIN (HCC): ICD-10-CM

## 2024-07-22 DIAGNOSIS — Z79.4 TYPE 2 DIABETES MELLITUS WITHOUT COMPLICATION, WITH LONG-TERM CURRENT USE OF INSULIN (HCC): Primary | ICD-10-CM

## 2024-07-22 DIAGNOSIS — Z79.4 TYPE 2 DIABETES MELLITUS WITHOUT COMPLICATION, WITH LONG-TERM CURRENT USE OF INSULIN (HCC): ICD-10-CM

## 2024-07-22 DIAGNOSIS — E53.8 VITAMIN B12 DEFICIENCY: ICD-10-CM

## 2024-07-22 DIAGNOSIS — M54.42 CHRONIC BILATERAL LOW BACK PAIN WITH BILATERAL SCIATICA: ICD-10-CM

## 2024-07-22 DIAGNOSIS — R25.2 LEG CRAMPS: ICD-10-CM

## 2024-07-22 DIAGNOSIS — M79.7 FIBROMYALGIA: ICD-10-CM

## 2024-07-22 DIAGNOSIS — F11.20 OPIOID DEPENDENCE WITH CURRENT USE (HCC): ICD-10-CM

## 2024-07-22 DIAGNOSIS — M51.36 DDD (DEGENERATIVE DISC DISEASE), LUMBAR: ICD-10-CM

## 2024-07-22 DIAGNOSIS — G89.29 CHRONIC BILATERAL LOW BACK PAIN WITH BILATERAL SCIATICA: ICD-10-CM

## 2024-07-22 DIAGNOSIS — E11.9 TYPE 2 DIABETES MELLITUS WITHOUT COMPLICATION, WITH LONG-TERM CURRENT USE OF INSULIN (HCC): Primary | ICD-10-CM

## 2024-07-22 DIAGNOSIS — M54.41 CHRONIC BILATERAL LOW BACK PAIN WITH BILATERAL SCIATICA: ICD-10-CM

## 2024-07-22 LAB
ALBUMIN SERPL-MCNC: 4.1 G/DL (ref 3.5–5.2)
ALBUMIN/GLOB SERPL: 2 {RATIO} (ref 1–2.5)
ALP SERPL-CCNC: 134 U/L (ref 35–104)
ALT SERPL-CCNC: 21 U/L (ref 10–35)
ANION GAP SERPL CALCULATED.3IONS-SCNC: 14 MMOL/L (ref 9–16)
AST SERPL-CCNC: 17 U/L (ref 10–35)
BASOPHILS # BLD: 0.09 K/UL (ref 0–0.2)
BASOPHILS NFR BLD: 1 % (ref 0–2)
BILIRUB SERPL-MCNC: 0.4 MG/DL (ref 0–1.2)
BUN SERPL-MCNC: 15 MG/DL (ref 8–23)
CALCIUM SERPL-MCNC: 9.5 MG/DL (ref 8.6–10.4)
CHLORIDE SERPL-SCNC: 104 MMOL/L (ref 98–107)
CHOLEST SERPL-MCNC: 147 MG/DL (ref 0–199)
CHOLESTEROL/HDL RATIO: 4
CO2 SERPL-SCNC: 20 MMOL/L (ref 20–31)
CREAT SERPL-MCNC: 0.9 MG/DL (ref 0.5–0.9)
CREAT UR-MCNC: 144 MG/DL (ref 28–217)
EOSINOPHIL # BLD: 0.32 K/UL (ref 0–0.44)
EOSINOPHILS RELATIVE PERCENT: 3 % (ref 1–4)
ERYTHROCYTE [DISTWIDTH] IN BLOOD BY AUTOMATED COUNT: 13.8 % (ref 11.8–14.4)
FOLATE SERPL-MCNC: 6.5 NG/ML (ref 4.8–24.2)
GFR, ESTIMATED: 73 ML/MIN/1.73M2
GLUCOSE SERPL-MCNC: 298 MG/DL (ref 74–99)
HBA1C MFR BLD: 10.6 %
HCT VFR BLD AUTO: 51.6 % (ref 36.3–47.1)
HDLC SERPL-MCNC: 36 MG/DL
HGB BLD-MCNC: 17 G/DL (ref 11.9–15.1)
IMM GRANULOCYTES # BLD AUTO: 0.05 K/UL (ref 0–0.3)
IMM GRANULOCYTES NFR BLD: 1 %
LDLC SERPL CALC-MCNC: 82 MG/DL (ref 0–100)
LYMPHOCYTES NFR BLD: 2.94 K/UL (ref 1.1–3.7)
LYMPHOCYTES RELATIVE PERCENT: 30 % (ref 24–43)
MAGNESIUM SERPL-MCNC: 2.2 MG/DL (ref 1.6–2.4)
MCH RBC QN AUTO: 28.1 PG (ref 25.2–33.5)
MCHC RBC AUTO-ENTMCNC: 32.9 G/DL (ref 28.4–34.8)
MCV RBC AUTO: 85.3 FL (ref 82.6–102.9)
MICROALBUMIN UR-MCNC: 13 MG/L (ref 0–20)
MICROALBUMIN/CREAT UR-RTO: 9 MCG/MG CREAT (ref 0–25)
MONOCYTES NFR BLD: 0.63 K/UL (ref 0.1–1.2)
MONOCYTES NFR BLD: 7 % (ref 3–12)
NEUTROPHILS NFR BLD: 58 % (ref 36–65)
NEUTS SEG NFR BLD: 5.68 K/UL (ref 1.5–8.1)
NRBC BLD-RTO: 0 PER 100 WBC
PLATELET # BLD AUTO: 192 K/UL (ref 138–453)
PMV BLD AUTO: 13 FL (ref 8.1–13.5)
POTASSIUM SERPL-SCNC: 4.2 MMOL/L (ref 3.7–5.3)
PROT SERPL-MCNC: 6.7 G/DL (ref 6.6–8.7)
RBC # BLD AUTO: 6.05 M/UL (ref 3.95–5.11)
SODIUM SERPL-SCNC: 138 MMOL/L (ref 136–145)
TRIGL SERPL-MCNC: 144 MG/DL
VIT B12 SERPL-MCNC: 742 PG/ML (ref 232–1245)
VLDLC SERPL CALC-MCNC: 29 MG/DL
WBC OTHER # BLD: 9.7 K/UL (ref 3.5–11.3)

## 2024-07-22 PROCEDURE — 3075F SYST BP GE 130 - 139MM HG: CPT | Performed by: FAMILY MEDICINE

## 2024-07-22 PROCEDURE — 99214 OFFICE O/P EST MOD 30 MIN: CPT | Performed by: FAMILY MEDICINE

## 2024-07-22 PROCEDURE — 83036 HEMOGLOBIN GLYCOSYLATED A1C: CPT | Performed by: FAMILY MEDICINE

## 2024-07-22 PROCEDURE — 3079F DIAST BP 80-89 MM HG: CPT | Performed by: FAMILY MEDICINE

## 2024-07-22 PROCEDURE — 3046F HEMOGLOBIN A1C LEVEL >9.0%: CPT | Performed by: FAMILY MEDICINE

## 2024-07-22 RX ORDER — MUPIROCIN CALCIUM 20 MG/G
CREAM TOPICAL
Qty: 30 G | Refills: 0 | Status: SHIPPED | OUTPATIENT
Start: 2024-07-22 | End: 2024-08-21

## 2024-07-22 RX ORDER — OXYCODONE HYDROCHLORIDE AND ACETAMINOPHEN 5; 325 MG/1; MG/1
1 TABLET ORAL EVERY 6 HOURS PRN
Qty: 120 TABLET | Refills: 0 | Status: SHIPPED | OUTPATIENT
Start: 2024-07-25 | End: 2024-08-24

## 2024-07-22 ASSESSMENT — ENCOUNTER SYMPTOMS
ALLERGIC/IMMUNOLOGIC NEGATIVE: 1
EYES NEGATIVE: 1
BOWEL INCONTINENCE: 0
BACK PAIN: 1
RESPIRATORY NEGATIVE: 1
BLURRED VISION: 0
SHORTNESS OF BREATH: 0
ORTHOPNEA: 0
GASTROINTESTINAL NEGATIVE: 1

## 2024-07-22 NOTE — PATIENT INSTRUCTIONS
Patient Education        Preventing Falls: Care Instructions  Injuries and health problems such as trouble walking or poor eyesight can increase your risk of falling. So can some medicines. But there are things you can do to help prevent falls. You can exercise to get stronger. You can also arrange your home to make it safer.    Talk to your doctor about the medicines you take. Ask if any of them increase the risk of falls and whether they can be changed or stopped.   Try to exercise regularly. It can help improve your strength and balance. This can help lower your risk of falling.         Practice fall safety and prevention.   Wear low-heeled shoes that fit well and give your feet good support. Talk to your doctor if you have foot problems that make this hard.  Carry a cellphone or wear a medical alert device that you can use to call for help.  Use stepladders instead of chairs to reach high objects. Don't climb if you're at risk for falls. Ask for help, if needed.  Wear the correct eyeglasses, if you need them.        Make your home safer.   Remove rugs, cords, clutter, and furniture from walkways.  Keep your house well lit. Use night-lights in hallways and bathrooms.  Install and use sturdy handrails on stairways.  Wear nonskid footwear, even inside. Don't walk barefoot or in socks without shoes.        Be safe outside.   Use handrails, curb cuts, and ramps whenever possible.  Keep your hands free by using a shoulder bag or backpack.  Try to walk in well-lit areas. Watch out for uneven ground, changes in pavement, and debris.  Be careful in the winter. Walk on the grass or gravel when sidewalks are slippery. Use de-icer on steps and walkways. Add non-slip devices to shoes.    Put grab bars and nonskid mats in your shower or tub and near the toilet. Try to use a shower chair or bath bench when bathing.   Get into a tub or shower by putting in your weaker leg first. Get out with your strong side first. Have a phone

## 2024-07-22 NOTE — ASSESSMENT & PLAN NOTE
Uncontrolled, continue current medications, continue current treatment plan, medication adherence emphasized, and lifestyle modifications recommended

## 2024-07-22 NOTE — PROGRESS NOTES
[facility] Progress Note    Date:7/22/2024         Patient Name:Sabrina Johnson     YOB: 1960     Age:63 y.o.    Assessment/Plan        Problem List Items Addressed This Visit          Circulatory    Essential (primary) hypertension      Borderline controlled, continue current medications, continue current treatment plan, medication adherence emphasized, and lifestyle modifications recommended         Relevant Orders    CBC with Auto Differential       Endocrine    Type 2 diabetes mellitus without complication, with long-term current use of insulin (HCC) - Primary      Uncontrolled, continue current medications, continue current treatment plan, medication adherence emphasized, and lifestyle modifications recommended         Relevant Orders    POCT glycosylated hemoglobin (Hb A1C)    CBC with Auto Differential    Microalbumin, Ur       Nervous and Auditory    Chronic bilateral low back pain with bilateral sciatica      Borderline controlled, continue current medications, continue current treatment plan, and medication adherence emphasized            Other    Fibromyalgia      Borderline controlled, continue current medications and continue current treatment plan         Vitamin B12 deficiency      Unclear control, continue current plan pending work up below         Relevant Orders    Vitamin B12 & Folate    Opioid dependence with current use (HCC)      At goal, continue current medications and continue current treatment plan         Dyslipidemia      Unclear control, continue current plan pending work up below         Relevant Orders    Comprehensive Metabolic Panel    Lipid Panel     Other Visit Diagnoses       Leg cramps        Check labwork    Relevant Orders    Magnesium    Cellulitis of umbilicus        Topical abx    Relevant Medications    mupirocin (BACTROBAN) 2 % cream             Return in about 3 months (around 10/22/2024).    Electronically signed by Shan Nuñez DO on 7/22/24

## 2024-07-22 NOTE — TELEPHONE ENCOUNTER
Left patient a message letting her know that pcp refilled her percocet's but it is not due to be refilled yet until 7/25/2024.    Three doses of Covid vaccine.  Monitor blood pressure at home and salt intake and bring the blood pressure log

## 2024-07-22 NOTE — TELEPHONE ENCOUNTER
Sabrina Johnson is calling to request a refill on the following medication(s):    Medication Request:  Requested Prescriptions     Pending Prescriptions Disp Refills    oxyCODONE-acetaminophen (PERCOCET) 5-325 MG per tablet 120 tablet 0     Sig: Take 1 tablet by mouth every 6 hours as needed for Pain for up to 30 days. Take lowest dose possible to manage pain       Last Visit Date (If Applicable):  7/22/2024    Next Visit Date:    10/11/2024

## 2024-07-23 ENCOUNTER — NURSE ONLY (OUTPATIENT)
Dept: FAMILY MEDICINE CLINIC | Age: 64
End: 2024-07-23

## 2024-07-23 ENCOUNTER — TELEPHONE (OUTPATIENT)
Dept: FAMILY MEDICINE CLINIC | Age: 64
End: 2024-07-23

## 2024-07-23 DIAGNOSIS — M15.9 PRIMARY OSTEOARTHRITIS INVOLVING MULTIPLE JOINTS: ICD-10-CM

## 2024-07-23 DIAGNOSIS — E53.8 VITAMIN B12 DEFICIENCY: Primary | ICD-10-CM

## 2024-07-23 RX ORDER — CYANOCOBALAMIN 1000 UG/ML
1000 INJECTION, SOLUTION INTRAMUSCULAR; SUBCUTANEOUS ONCE
Status: COMPLETED | OUTPATIENT
Start: 2024-07-23 | End: 2024-07-23

## 2024-07-23 RX ADMIN — CYANOCOBALAMIN 1000 MCG: 1000 INJECTION, SOLUTION INTRAMUSCULAR; SUBCUTANEOUS at 14:43

## 2024-07-23 NOTE — PROGRESS NOTES
After obtaining consent, and per orders of Dr. yoon, injection of b12 and toradol given in Left deltoid and left ventralglutal by Jaz Baldwin MA. Patient instructed to remain in clinic for 20 minutes afterwards, and to report any adverse reaction to me immediately.

## 2024-08-23 DIAGNOSIS — M51.36 DDD (DEGENERATIVE DISC DISEASE), LUMBAR: ICD-10-CM

## 2024-08-23 DIAGNOSIS — Z79.4 TYPE 2 DIABETES MELLITUS WITHOUT COMPLICATION, WITH LONG-TERM CURRENT USE OF INSULIN (HCC): ICD-10-CM

## 2024-08-23 DIAGNOSIS — E11.9 TYPE 2 DIABETES MELLITUS WITHOUT COMPLICATION, WITH LONG-TERM CURRENT USE OF INSULIN (HCC): ICD-10-CM

## 2024-08-23 DIAGNOSIS — M79.7 FIBROMYALGIA: ICD-10-CM

## 2024-08-23 DIAGNOSIS — M15.9 PRIMARY OSTEOARTHRITIS INVOLVING MULTIPLE JOINTS: ICD-10-CM

## 2024-08-23 RX ORDER — OXYCODONE HYDROCHLORIDE AND ACETAMINOPHEN 5; 325 MG/1; MG/1
1 TABLET ORAL EVERY 6 HOURS PRN
Qty: 120 TABLET | Refills: 0 | Status: SHIPPED | OUTPATIENT
Start: 2024-08-23 | End: 2024-09-22

## 2024-08-23 RX ORDER — INSULIN GLARGINE 100 [IU]/ML
20 INJECTION, SOLUTION SUBCUTANEOUS 2 TIMES DAILY
Qty: 15 ML | Refills: 5 | Status: SHIPPED | OUTPATIENT
Start: 2024-08-23

## 2024-08-23 NOTE — TELEPHONE ENCOUNTER
Sabrina Johnson is calling to request a refill on the following medication(s):    Last Visit Date (If Applicable):  7/22/2024    Next Visit Date:    10/11/2024    Medication Request:  Requested Prescriptions     Pending Prescriptions Disp Refills    oxyCODONE-acetaminophen (PERCOCET) 5-325 MG per tablet 120 tablet 0     Sig: Take 1 tablet by mouth every 6 hours as needed for Pain for up to 30 days. Take lowest dose possible to manage pain    insulin glargine (LANTUS SOLOSTAR) 100 UNIT/ML injection pen 15 mL 5

## 2024-09-04 ENCOUNTER — TELEPHONE (OUTPATIENT)
Dept: FAMILY MEDICINE CLINIC | Age: 64
End: 2024-09-04

## 2024-09-05 NOTE — TELEPHONE ENCOUNTER
I do not have any openings unfortunately. Please see if she can see any other provider with an opening tomorrow or early next week. Thank you

## 2024-09-09 ENCOUNTER — OFFICE VISIT (OUTPATIENT)
Dept: FAMILY MEDICINE CLINIC | Age: 64
End: 2024-09-09

## 2024-09-09 VITALS
TEMPERATURE: 97 F | OXYGEN SATURATION: 97 % | WEIGHT: 258 LBS | BODY MASS INDEX: 40.41 KG/M2 | SYSTOLIC BLOOD PRESSURE: 182 MMHG | DIASTOLIC BLOOD PRESSURE: 115 MMHG | HEART RATE: 98 BPM

## 2024-09-09 DIAGNOSIS — E11.65 TYPE 2 DIABETES MELLITUS WITH HYPERGLYCEMIA, WITH LONG-TERM CURRENT USE OF INSULIN (HCC): ICD-10-CM

## 2024-09-09 DIAGNOSIS — I10 ESSENTIAL (PRIMARY) HYPERTENSION: ICD-10-CM

## 2024-09-09 DIAGNOSIS — M48.062 LUMBAR STENOSIS WITH NEUROGENIC CLAUDICATION: Primary | ICD-10-CM

## 2024-09-09 DIAGNOSIS — M51.36 DDD (DEGENERATIVE DISC DISEASE), LUMBAR: ICD-10-CM

## 2024-09-09 DIAGNOSIS — Z79.4 TYPE 2 DIABETES MELLITUS WITH HYPERGLYCEMIA, WITH LONG-TERM CURRENT USE OF INSULIN (HCC): ICD-10-CM

## 2024-09-09 RX ORDER — METOPROLOL TARTRATE 50 MG
50 TABLET ORAL 2 TIMES DAILY
Qty: 90 TABLET | Refills: 3 | Status: SHIPPED
Start: 2024-09-09

## 2024-09-09 RX ORDER — KETOROLAC TROMETHAMINE 30 MG/ML
30 INJECTION, SOLUTION INTRAMUSCULAR; INTRAVENOUS ONCE
Status: COMPLETED | OUTPATIENT
Start: 2024-09-09 | End: 2024-09-09

## 2024-09-09 RX ADMIN — KETOROLAC TROMETHAMINE 30 MG: 30 INJECTION, SOLUTION INTRAMUSCULAR; INTRAVENOUS at 16:38

## 2024-09-09 ASSESSMENT — ENCOUNTER SYMPTOMS: BACK PAIN: 1

## 2024-09-09 ASSESSMENT — PATIENT HEALTH QUESTIONNAIRE - PHQ9
9. THOUGHTS THAT YOU WOULD BE BETTER OFF DEAD, OR OF HURTING YOURSELF: NOT AT ALL
SUM OF ALL RESPONSES TO PHQ9 QUESTIONS 1 & 2: 2
1. LITTLE INTEREST OR PLEASURE IN DOING THINGS: NOT AT ALL
5. POOR APPETITE OR OVEREATING: NOT AT ALL
SUM OF ALL RESPONSES TO PHQ QUESTIONS 1-9: 4
SUM OF ALL RESPONSES TO PHQ QUESTIONS 1-9: 4
4. FEELING TIRED OR HAVING LITTLE ENERGY: NOT AT ALL
10. IF YOU CHECKED OFF ANY PROBLEMS, HOW DIFFICULT HAVE THESE PROBLEMS MADE IT FOR YOU TO DO YOUR WORK, TAKE CARE OF THINGS AT HOME, OR GET ALONG WITH OTHER PEOPLE: NOT DIFFICULT AT ALL
3. TROUBLE FALLING OR STAYING ASLEEP: MORE THAN HALF THE DAYS
7. TROUBLE CONCENTRATING ON THINGS, SUCH AS READING THE NEWSPAPER OR WATCHING TELEVISION: NOT AT ALL
2. FEELING DOWN, DEPRESSED OR HOPELESS: MORE THAN HALF THE DAYS
SUM OF ALL RESPONSES TO PHQ QUESTIONS 1-9: 4
6. FEELING BAD ABOUT YOURSELF - OR THAT YOU ARE A FAILURE OR HAVE LET YOURSELF OR YOUR FAMILY DOWN: NOT AT ALL
SUM OF ALL RESPONSES TO PHQ QUESTIONS 1-9: 4
8. MOVING OR SPEAKING SO SLOWLY THAT OTHER PEOPLE COULD HAVE NOTICED. OR THE OPPOSITE, BEING SO FIGETY OR RESTLESS THAT YOU HAVE BEEN MOVING AROUND A LOT MORE THAN USUAL: NOT AT ALL

## 2024-09-17 ENCOUNTER — TELEPHONE (OUTPATIENT)
Dept: PAIN MANAGEMENT | Age: 64
End: 2024-09-17

## 2024-09-17 NOTE — TELEPHONE ENCOUNTER
Patient calling to get scheduled for NP appointment, please call patient at [phone] [facility]/sc

## 2024-09-23 DIAGNOSIS — M15.9 PRIMARY OSTEOARTHRITIS INVOLVING MULTIPLE JOINTS: ICD-10-CM

## 2024-09-23 DIAGNOSIS — M51.36 DDD (DEGENERATIVE DISC DISEASE), LUMBAR: ICD-10-CM

## 2024-09-23 DIAGNOSIS — J30.2 SEASONAL ALLERGIC RHINITIS, UNSPECIFIED TRIGGER: ICD-10-CM

## 2024-09-23 DIAGNOSIS — M79.7 FIBROMYALGIA: ICD-10-CM

## 2024-09-23 DIAGNOSIS — I10 ESSENTIAL (PRIMARY) HYPERTENSION: ICD-10-CM

## 2024-09-24 RX ORDER — FLUTICASONE PROPIONATE 50 MCG
SPRAY, SUSPENSION (ML) NASAL
Qty: 3 EACH | Refills: 1 | Status: SHIPPED | OUTPATIENT
Start: 2024-09-24

## 2024-09-24 RX ORDER — LISINOPRIL AND HYDROCHLOROTHIAZIDE 12.5; 2 MG/1; MG/1
2 TABLET ORAL DAILY
Qty: 180 TABLET | Refills: 1 | Status: SHIPPED | OUTPATIENT
Start: 2024-09-24

## 2024-09-24 RX ORDER — OXYCODONE AND ACETAMINOPHEN 5; 325 MG/1; MG/1
1 TABLET ORAL EVERY 6 HOURS PRN
Qty: 120 TABLET | Refills: 0 | Status: SHIPPED | OUTPATIENT
Start: 2024-09-24 | End: 2024-10-24

## 2024-10-08 ENCOUNTER — INITIAL CONSULT (OUTPATIENT)
Dept: PAIN MANAGEMENT | Age: 64
End: 2024-10-08
Payer: MEDICAID

## 2024-10-08 VITALS — HEIGHT: 67 IN | WEIGHT: 258 LBS | BODY MASS INDEX: 40.49 KG/M2

## 2024-10-08 DIAGNOSIS — E66.01 CLASS 3 SEVERE OBESITY DUE TO EXCESS CALORIES WITH SERIOUS COMORBIDITY AND BODY MASS INDEX (BMI) OF 40.0 TO 44.9 IN ADULT: ICD-10-CM

## 2024-10-08 DIAGNOSIS — E66.813 CLASS 3 SEVERE OBESITY DUE TO EXCESS CALORIES WITH SERIOUS COMORBIDITY AND BODY MASS INDEX (BMI) OF 40.0 TO 44.9 IN ADULT: ICD-10-CM

## 2024-10-08 DIAGNOSIS — M48.062 SPINAL STENOSIS OF LUMBAR REGION WITH NEUROGENIC CLAUDICATION: Primary | ICD-10-CM

## 2024-10-08 DIAGNOSIS — F11.20 OPIOID DEPENDENCE WITH CURRENT USE (HCC): ICD-10-CM

## 2024-10-08 PROCEDURE — 99244 OFF/OP CNSLTJ NEW/EST MOD 40: CPT | Performed by: ANESTHESIOLOGY

## 2024-10-08 ASSESSMENT — ENCOUNTER SYMPTOMS
EYES NEGATIVE: 1
RESPIRATORY NEGATIVE: 1
ALLERGIC/IMMUNOLOGIC NEGATIVE: 1
GASTROINTESTINAL NEGATIVE: 1
BACK PAIN: 1

## 2024-10-08 NOTE — PROGRESS NOTES
The patient is a 64 y.o.Non- / non  female.    Chief Complaint   Patient presents with    New Patient     Back pain        HPI    Pain History  -This is a 64-year-old female with past medical history significant for diabetes, obesity and opioid dependence  Patient is complaining of back pain going on for 10+ years located in the lower lumbar area with radiation down both legs describes it as burning stabbing sensation  Pain aggravates with minimal activity  Particularly walking  States that nighttime get a spasm in the legs  Denies any loss of bladder or bowel control  No previous physical therapy  No previous spine interventional procedure  No previous lumbar spine surgical history    Hide last MRI lumbar spine in 2022 that showed moderate spinal stenosis at L4 level  Patient states that the surgeon told her that she needs surgery but she is scared of surgery    She is opioid dependent for many years as per her  She is prescribed oxycodone 5 mg 4 times a day by PCP  Daily morphine equivalent 30  Report 9 out of 10 pain despite of daily use of opioids    Pain score today:  9  1. Location: back     2. Radiation: both legs   3. Character: burning, stabbing    5. Duration:    6. Onset: 10+  7. Did an injury cause pain: falls   8. Aggravating factors:  ADLs  9. Alleviating factors:  Percocet  10. Associated symptoms (numbness / tingling / weakness): weakness  -Where at: arms & legs  -Down into finger tips or toes (specify which finger or toes):    -constant or intermitting:  constant   11. Red Flags: (weight loss / chills / loss of bladder or bowel control): no     Previous management history  1. Previous diagnostic workup: (Imaging/EMG)   CT, MRI, or Xray: MRI   What part of the body: Lumbar   What facility [facility]  What year or specific date: 09/01/2022  EMG:  yes     2. Previous non interventional treatments tried:  chiropractor or physical therapy: n/a  What part of the body: n/a

## 2024-10-22 ENCOUNTER — TELEPHONE (OUTPATIENT)
Dept: FAMILY MEDICINE CLINIC | Age: 64
End: 2024-10-22

## 2024-10-22 ENCOUNTER — OFFICE VISIT (OUTPATIENT)
Dept: FAMILY MEDICINE CLINIC | Age: 64
End: 2024-10-22
Payer: MEDICAID

## 2024-10-22 VITALS
DIASTOLIC BLOOD PRESSURE: 100 MMHG | OXYGEN SATURATION: 96 % | WEIGHT: 256.5 LBS | HEART RATE: 94 BPM | BODY MASS INDEX: 40.17 KG/M2 | SYSTOLIC BLOOD PRESSURE: 144 MMHG | TEMPERATURE: 97.4 F

## 2024-10-22 DIAGNOSIS — M79.7 FIBROMYALGIA: ICD-10-CM

## 2024-10-22 DIAGNOSIS — Z23 IMMUNIZATION DUE: ICD-10-CM

## 2024-10-22 DIAGNOSIS — M15.0 PRIMARY OSTEOARTHRITIS INVOLVING MULTIPLE JOINTS: ICD-10-CM

## 2024-10-22 DIAGNOSIS — M54.41 CHRONIC BILATERAL LOW BACK PAIN WITH BILATERAL SCIATICA: ICD-10-CM

## 2024-10-22 DIAGNOSIS — M51.369 DDD (DEGENERATIVE DISC DISEASE), LUMBAR: ICD-10-CM

## 2024-10-22 DIAGNOSIS — I10 ESSENTIAL (PRIMARY) HYPERTENSION: ICD-10-CM

## 2024-10-22 DIAGNOSIS — Z79.4 TYPE 2 DIABETES MELLITUS WITH HYPERGLYCEMIA, WITH LONG-TERM CURRENT USE OF INSULIN (HCC): Primary | ICD-10-CM

## 2024-10-22 DIAGNOSIS — M54.42 CHRONIC BILATERAL LOW BACK PAIN WITH BILATERAL SCIATICA: ICD-10-CM

## 2024-10-22 DIAGNOSIS — E53.8 VITAMIN B12 DEFICIENCY: ICD-10-CM

## 2024-10-22 DIAGNOSIS — F33.2 MAJOR DEPRESSIVE DISORDER, RECURRENT EPISODE, SEVERE WITH MIXED FEATURES (HCC): ICD-10-CM

## 2024-10-22 DIAGNOSIS — G89.29 CHRONIC BILATERAL LOW BACK PAIN WITH BILATERAL SCIATICA: ICD-10-CM

## 2024-10-22 DIAGNOSIS — E11.65 TYPE 2 DIABETES MELLITUS WITH HYPERGLYCEMIA, WITH LONG-TERM CURRENT USE OF INSULIN (HCC): Primary | ICD-10-CM

## 2024-10-22 PROBLEM — E11.9 TYPE 2 DIABETES MELLITUS WITHOUT COMPLICATION, WITH LONG-TERM CURRENT USE OF INSULIN (HCC): Status: RESOLVED | Noted: 2018-12-03 | Resolved: 2024-10-22

## 2024-10-22 LAB — HBA1C MFR BLD: 8.4 %

## 2024-10-22 PROCEDURE — 83036 HEMOGLOBIN GLYCOSYLATED A1C: CPT | Performed by: FAMILY MEDICINE

## 2024-10-22 PROCEDURE — 3080F DIAST BP >= 90 MM HG: CPT | Performed by: FAMILY MEDICINE

## 2024-10-22 PROCEDURE — 90661 CCIIV3 VAC ABX FR 0.5 ML IM: CPT | Performed by: FAMILY MEDICINE

## 2024-10-22 PROCEDURE — 99214 OFFICE O/P EST MOD 30 MIN: CPT | Performed by: FAMILY MEDICINE

## 2024-10-22 PROCEDURE — 3077F SYST BP >= 140 MM HG: CPT | Performed by: FAMILY MEDICINE

## 2024-10-22 PROCEDURE — 90471 IMMUNIZATION ADMIN: CPT | Performed by: FAMILY MEDICINE

## 2024-10-22 PROCEDURE — 3052F HG A1C>EQUAL 8.0%<EQUAL 9.0%: CPT | Performed by: FAMILY MEDICINE

## 2024-10-22 RX ORDER — CYANOCOBALAMIN 1000 UG/ML
1000 INJECTION, SOLUTION INTRAMUSCULAR; SUBCUTANEOUS ONCE
Status: COMPLETED | OUTPATIENT
Start: 2024-10-22 | End: 2024-10-22

## 2024-10-22 RX ORDER — KETOROLAC TROMETHAMINE 30 MG/ML
30 INJECTION, SOLUTION INTRAMUSCULAR; INTRAVENOUS ONCE
Status: COMPLETED | OUTPATIENT
Start: 2024-10-22 | End: 2024-10-22

## 2024-10-22 RX ORDER — OXYCODONE AND ACETAMINOPHEN 5; 325 MG/1; MG/1
1 TABLET ORAL EVERY 6 HOURS PRN
Qty: 120 TABLET | Refills: 0 | Status: SHIPPED | OUTPATIENT
Start: 2024-10-24 | End: 2024-11-23

## 2024-10-22 RX ADMIN — KETOROLAC TROMETHAMINE 30 MG: 30 INJECTION, SOLUTION INTRAMUSCULAR; INTRAVENOUS at 10:53

## 2024-10-22 RX ADMIN — CYANOCOBALAMIN 1000 MCG: 1000 INJECTION, SOLUTION INTRAMUSCULAR; SUBCUTANEOUS at 10:52

## 2024-10-22 SDOH — ECONOMIC STABILITY: INCOME INSECURITY: HOW HARD IS IT FOR YOU TO PAY FOR THE VERY BASICS LIKE FOOD, HOUSING, MEDICAL CARE, AND HEATING?: NOT HARD AT ALL

## 2024-10-22 SDOH — ECONOMIC STABILITY: FOOD INSECURITY: WITHIN THE PAST 12 MONTHS, YOU WORRIED THAT YOUR FOOD WOULD RUN OUT BEFORE YOU GOT MONEY TO BUY MORE.: NEVER TRUE

## 2024-10-22 SDOH — ECONOMIC STABILITY: FOOD INSECURITY: WITHIN THE PAST 12 MONTHS, THE FOOD YOU BOUGHT JUST DIDN'T LAST AND YOU DIDN'T HAVE MONEY TO GET MORE.: NEVER TRUE

## 2024-10-22 ASSESSMENT — ENCOUNTER SYMPTOMS
SHORTNESS OF BREATH: 0
ORTHOPNEA: 0
BLURRED VISION: 0
GASTROINTESTINAL NEGATIVE: 1
BOWEL INCONTINENCE: 0
BACK PAIN: 1
RESPIRATORY NEGATIVE: 1
ALLERGIC/IMMUNOLOGIC NEGATIVE: 1
EYES NEGATIVE: 1

## 2024-10-22 ASSESSMENT — PATIENT HEALTH QUESTIONNAIRE - PHQ9
9. THOUGHTS THAT YOU WOULD BE BETTER OFF DEAD, OR OF HURTING YOURSELF: NOT AT ALL
10. IF YOU CHECKED OFF ANY PROBLEMS, HOW DIFFICULT HAVE THESE PROBLEMS MADE IT FOR YOU TO DO YOUR WORK, TAKE CARE OF THINGS AT HOME, OR GET ALONG WITH OTHER PEOPLE: SOMEWHAT DIFFICULT
3. TROUBLE FALLING OR STAYING ASLEEP: NEARLY EVERY DAY
4. FEELING TIRED OR HAVING LITTLE ENERGY: NEARLY EVERY DAY
SUM OF ALL RESPONSES TO PHQ QUESTIONS 1-9: 10
SUM OF ALL RESPONSES TO PHQ9 QUESTIONS 1 & 2: 2
SUM OF ALL RESPONSES TO PHQ QUESTIONS 1-9: 10
5. POOR APPETITE OR OVEREATING: NOT AT ALL
1. LITTLE INTEREST OR PLEASURE IN DOING THINGS: NOT AT ALL
SUM OF ALL RESPONSES TO PHQ QUESTIONS 1-9: 10
6. FEELING BAD ABOUT YOURSELF - OR THAT YOU ARE A FAILURE OR HAVE LET YOURSELF OR YOUR FAMILY DOWN: NOT AT ALL
2. FEELING DOWN, DEPRESSED OR HOPELESS: MORE THAN HALF THE DAYS
7. TROUBLE CONCENTRATING ON THINGS, SUCH AS READING THE NEWSPAPER OR WATCHING TELEVISION: MORE THAN HALF THE DAYS
8. MOVING OR SPEAKING SO SLOWLY THAT OTHER PEOPLE COULD HAVE NOTICED. OR THE OPPOSITE, BEING SO FIGETY OR RESTLESS THAT YOU HAVE BEEN MOVING AROUND A LOT MORE THAN USUAL: NOT AT ALL
SUM OF ALL RESPONSES TO PHQ QUESTIONS 1-9: 10

## 2024-10-22 NOTE — ASSESSMENT & PLAN NOTE
Chronic, not at goal (unstable), continue current treatment plan, medication adherence emphasized, and lifestyle modifications recommended  A1c improving

## 2024-10-22 NOTE — TELEPHONE ENCOUNTER
Patient called and stated she was seen earlier today and prescriptions were to be sent to her pharmacy but they have not received any prescriptions yet. She would like her prescriptions to be sent in as soon as possible.

## 2024-10-22 NOTE — PATIENT INSTRUCTIONS
[email or web address] and enter H967 to learn more about \"DASH Diet: Care Instructions.\"  Current as of: September 20, 2023  Content Version: 14.2  © 2024 [employer].   Care instructions adapted under license by [employer]. If you have questions about a medical condition or this instruction, always ask your healthcare professional. [employer] disclaims any warranty or liability for your use of this information.

## 2024-10-22 NOTE — PROGRESS NOTES
Past Surgical History:   Procedure Laterality Date   • COLONOSCOPY  06/2016    poor prep, there appears to be some polyps in the colon mixed with stool, could not be removed        Physical Examination      Vitals:  BP (!) 144/100 (Site: Left Upper Arm, Position: Sitting, Cuff Size: Large Adult)   Pulse 94   Temp 97.4 °F (36.3 °C) (Temporal)   Wt 116.3 kg (256 lb 8 oz)   SpO2 96%   BMI 40.17 kg/m²     Physical Exam  Vitals and nursing note reviewed.   Constitutional:       General: She is not in acute distress.     Appearance: Normal appearance. She is obese. She is not ill-appearing, toxic-appearing or diaphoretic.   HENT:      Head: Normocephalic and atraumatic.      Right Ear: Tympanic membrane, ear canal and external ear normal. There is no impacted cerumen.      Left Ear: Tympanic membrane, ear canal and external ear normal. There is no impacted cerumen.      Nose: Nose normal. No congestion or rhinorrhea.      Mouth/Throat:      Mouth: Mucous membranes are moist.      Pharynx: Oropharynx is clear. No oropharyngeal exudate or posterior oropharyngeal erythema.   Eyes:      General: No scleral icterus.        Right eye: No discharge.         Left eye: No discharge.      Extraocular Movements: Extraocular movements intact.      Conjunctiva/sclera: Conjunctivae normal.      Pupils: Pupils are equal, round, and reactive to light.   Cardiovascular:      Rate and Rhythm: Normal rate and regular rhythm.      Pulses: Normal pulses.      Heart sounds: Normal heart sounds. No murmur heard.     No friction rub. No gallop.   Pulmonary:      Effort: Pulmonary effort is normal. No respiratory distress.      Breath sounds: Normal breath sounds. No stridor. No wheezing, rhonchi or rales.   Chest:      Chest wall: No tenderness.   Abdominal:      General: Abdomen is flat. Bowel sounds are normal. There is no distension.      Palpations: Abdomen is soft. There is no mass.      Tenderness: There is no abdominal tenderness.

## 2024-10-22 NOTE — ASSESSMENT & PLAN NOTE
Chronic, not at goal (unstable), continue current treatment plan and medication adherence emphasized

## 2024-10-24 DIAGNOSIS — K21.9 GASTROESOPHAGEAL REFLUX DISEASE, UNSPECIFIED WHETHER ESOPHAGITIS PRESENT: ICD-10-CM

## 2024-10-24 RX ORDER — OMEPRAZOLE 40 MG/1
40 CAPSULE, DELAYED RELEASE ORAL
Qty: 90 CAPSULE | Refills: 1 | Status: SHIPPED | OUTPATIENT
Start: 2024-10-24

## 2024-10-24 NOTE — TELEPHONE ENCOUNTER
Sabrina Johnson is calling to request a refill on the following medication(s):    Medication Request:  Requested Prescriptions     Pending Prescriptions Disp Refills    omeprazole (PRILOSEC) 40 MG delayed release capsule [Pharmacy Med Name: OMEPRAZOLE DR 40 MG CAPSULE] 90 capsule 1     Sig: TAKE ONE CAPSULE BY MOUTH EVERY MORNING BEFORE BREAKFAST       Last Visit Date (If Applicable):  10/22/2024    Next Visit Date:    Visit date not found

## 2024-11-22 ENCOUNTER — APPOINTMENT (OUTPATIENT)
Dept: GENERAL RADIOLOGY | Age: 64
End: 2024-11-22
Payer: MEDICAID

## 2024-11-22 ENCOUNTER — HOSPITAL ENCOUNTER (EMERGENCY)
Age: 64
Discharge: HOME | End: 2024-11-22
Attending: EMERGENCY MEDICINE
Payer: MEDICAID

## 2024-11-22 VITALS
RESPIRATION RATE: 18 BRPM | HEIGHT: 67 IN | HEART RATE: 95 BPM | SYSTOLIC BLOOD PRESSURE: 163 MMHG | WEIGHT: 254 LBS | TEMPERATURE: 98.4 F | DIASTOLIC BLOOD PRESSURE: 106 MMHG | BODY MASS INDEX: 39.87 KG/M2 | OXYGEN SATURATION: 97 %

## 2024-11-22 DIAGNOSIS — Z79.4 TYPE 2 DIABETES MELLITUS WITHOUT COMPLICATION, WITH LONG-TERM CURRENT USE OF INSULIN (HCC): ICD-10-CM

## 2024-11-22 DIAGNOSIS — G89.29 ACUTE EXACERBATION OF CHRONIC LOW BACK PAIN: Primary | ICD-10-CM

## 2024-11-22 DIAGNOSIS — E11.9 TYPE 2 DIABETES MELLITUS WITHOUT COMPLICATION, WITH LONG-TERM CURRENT USE OF INSULIN (HCC): ICD-10-CM

## 2024-11-22 DIAGNOSIS — M54.50 ACUTE EXACERBATION OF CHRONIC LOW BACK PAIN: Primary | ICD-10-CM

## 2024-11-22 DIAGNOSIS — Z76.0 ENCOUNTER FOR MEDICATION REFILL: ICD-10-CM

## 2024-11-22 DIAGNOSIS — I10 ESSENTIAL (PRIMARY) HYPERTENSION: ICD-10-CM

## 2024-11-22 PROCEDURE — 6370000000 HC RX 637 (ALT 250 FOR IP): Performed by: PHYSICIAN ASSISTANT

## 2024-11-22 PROCEDURE — 6360000002 HC RX W HCPCS: Performed by: PHYSICIAN ASSISTANT

## 2024-11-22 PROCEDURE — 96372 THER/PROPH/DIAG INJ SC/IM: CPT

## 2024-11-22 PROCEDURE — 99284 EMERGENCY DEPT VISIT MOD MDM: CPT

## 2024-11-22 PROCEDURE — 73562 X-RAY EXAM OF KNEE 3: CPT

## 2024-11-22 PROCEDURE — 72100 X-RAY EXAM L-S SPINE 2/3 VWS: CPT

## 2024-11-22 RX ORDER — OXYCODONE AND ACETAMINOPHEN 5; 325 MG/1; MG/1
1-2 TABLET ORAL EVERY 6 HOURS PRN
Qty: 15 TABLET | Refills: 0 | Status: SHIPPED | OUTPATIENT
Start: 2024-11-22 | End: 2024-11-27

## 2024-11-22 RX ORDER — MORPHINE SULFATE 4 MG/ML
4 INJECTION, SOLUTION INTRAMUSCULAR; INTRAVENOUS ONCE
Status: COMPLETED | OUTPATIENT
Start: 2024-11-22 | End: 2024-11-22

## 2024-11-22 RX ORDER — ORPHENADRINE CITRATE 30 MG/ML
60 INJECTION INTRAMUSCULAR; INTRAVENOUS ONCE
Status: COMPLETED | OUTPATIENT
Start: 2024-11-22 | End: 2024-11-22

## 2024-11-22 RX ORDER — METOPROLOL TARTRATE 50 MG
50 TABLET ORAL 2 TIMES DAILY
Qty: 90 TABLET | Refills: 3 | Status: SHIPPED | OUTPATIENT
Start: 2024-11-22

## 2024-11-22 RX ORDER — ONDANSETRON 4 MG/1
4 TABLET, ORALLY DISINTEGRATING ORAL ONCE
Status: COMPLETED | OUTPATIENT
Start: 2024-11-22 | End: 2024-11-22

## 2024-11-22 RX ORDER — OXYCODONE AND ACETAMINOPHEN 5; 325 MG/1; MG/1
1 TABLET ORAL ONCE
Status: COMPLETED | OUTPATIENT
Start: 2024-11-22 | End: 2024-11-22

## 2024-11-22 RX ORDER — LANCETS 30 GAUGE
EACH MISCELLANEOUS
Qty: 100 EACH | Refills: 5 | Status: SHIPPED | OUTPATIENT
Start: 2024-11-22

## 2024-11-22 RX ORDER — METOPROLOL TARTRATE 25 MG/1
50 TABLET, FILM COATED ORAL ONCE
Status: COMPLETED | OUTPATIENT
Start: 2024-11-22 | End: 2024-11-22

## 2024-11-22 RX ADMIN — ONDANSETRON 4 MG: 4 TABLET, ORALLY DISINTEGRATING ORAL at 13:41

## 2024-11-22 RX ADMIN — METOPROLOL TARTRATE 50 MG: 25 TABLET, FILM COATED ORAL at 14:38

## 2024-11-22 RX ADMIN — OXYCODONE AND ACETAMINOPHEN 1 TABLET: 5; 325 TABLET ORAL at 14:56

## 2024-11-22 RX ADMIN — MORPHINE SULFATE 4 MG: 4 INJECTION, SOLUTION INTRAMUSCULAR; INTRAVENOUS at 13:46

## 2024-11-22 RX ADMIN — ORPHENADRINE CITRATE 60 MG: 60 INJECTION INTRAMUSCULAR; INTRAVENOUS at 13:41

## 2024-11-22 ASSESSMENT — PAIN DESCRIPTION - ORIENTATION: ORIENTATION: LOWER

## 2024-11-22 ASSESSMENT — PAIN SCALES - GENERAL
PAINLEVEL_OUTOF10: 10
PAINLEVEL_OUTOF10: 10
PAINLEVEL_OUTOF10: 9

## 2024-11-22 ASSESSMENT — PAIN DESCRIPTION - FREQUENCY: FREQUENCY: CONTINUOUS

## 2024-11-22 ASSESSMENT — PAIN DESCRIPTION - PAIN TYPE: TYPE: ACUTE PAIN;CHRONIC PAIN

## 2024-11-22 ASSESSMENT — PAIN - FUNCTIONAL ASSESSMENT: PAIN_FUNCTIONAL_ASSESSMENT: 0-10

## 2024-11-22 ASSESSMENT — PAIN DESCRIPTION - DESCRIPTORS: DESCRIPTORS: POUNDING

## 2024-11-22 ASSESSMENT — PAIN DESCRIPTION - LOCATION: LOCATION: BACK

## 2024-11-22 NOTE — ED PROVIDER NOTES
eMERGENCY dEPARTMENT eNCOUnter   Independent Attestation     Pt Name: Sabrina Johnson  MRN: [id number]  Birthdate 1960  Date of evaluation: 11/22/24     Sabrina Johnson is a 64 y.o. female with CC: Lower Back Pain (Hx chronic back pain with sciatica)        This visit was performed by both a physician and an APC. I performed all aspects of the MDM as documented.      Cara Fry MD  Attending Emergency Physician            Cara Fry MD  11/22/24 [id number]

## 2024-11-22 NOTE — DISCHARGE INSTRUCTIONS
Take meds as prescribed.  Follow outpatient tomorrow with doctor or by calling [phone] or [phone].   Return to ER immediately if symptoms worsen or persist.    Do not drive, operate machinery, climb or engage in any dangerous activity while taking narcotics or percocet.

## 2024-11-23 NOTE — ED PROVIDER NOTES
[facility] ED  eMERGENCY dEPARTMENTeNCOUnter      Pt Name: Sabrina Johnson  MRN: [id number]  Birthdate 1960  Date ofevaluation: 11/22/2024  Provider: Kirby Grey PA-C    CHIEF COMPLAINT       Chief Complaint   Patient presents with    Lower Back Pain     Hx chronic back pain with sciatica         HISTORY OF PRESENT ILLNESS  (Location/Symptom, Timing/Onset, Context/Setting, Quality, Duration, Modifying Factors, Severity.)   Sabrina Johnson is a 64 y.o. female who presents to the emergency department with back pain radiating down the left leg.  This is a chronic issue.  Patient also needs refills of some of her medications including her blood pressure meds.  Was hypertensive upon arrival.  She has not had her second dose of metoprolol.  Patient states her doctor who prescribed her pain medication has since left the practice and she is in need of a new doctor.  She did have a fall onto her buttock area around a week ago.      Nursing Notes were reviewed.    ALLERGIES     Patient has no known allergies.    CURRENT MEDICATIONS       Discharge Medication List as of 11/22/2024  2:52 PM        CONTINUE these medications which have NOT CHANGED    Details   omeprazole (PRILOSEC) 40 MG delayed release capsule TAKE ONE CAPSULE BY MOUTH EVERY MORNING BEFORE BREAKFAST, Disp-90 capsule, R-1Normal      fluticasone (FLONASE) 50 MCG/ACT nasal spray SPRAY TWO SPRAYS IN EACH NOSTRIL ONCE DAILY, Disp-3 each, R-1Normal      lisinopril-hydroCHLOROthiazide (PRINZIDE;ZESTORETIC) 20-12.5 MG per tablet Take 2 tablets by mouth daily, Disp-180 tablet, R-1Normal      insulin glargine (LANTUS SOLOSTAR) 100 UNIT/ML injection pen Inject 20 Units into the skin 2 times daily, Disp-15 mL, R-5Normal      fluocinonide (LIDEX) 0.05 % cream Apply topically 2 times daily., Disp-120 g, R-2, Normal      potassium chloride (KLOR-CON M) 20 MEQ extended release tablet Take 1 tablet by mouth daily, Disp-90 tablet, R-1Normal

## 2024-12-04 ENCOUNTER — HOSPITAL ENCOUNTER (EMERGENCY)
Age: 64
Discharge: HOME | End: 2024-12-04
Attending: EMERGENCY MEDICINE
Payer: MEDICAID

## 2024-12-04 VITALS
OXYGEN SATURATION: 97 % | SYSTOLIC BLOOD PRESSURE: 183 MMHG | WEIGHT: 258 LBS | BODY MASS INDEX: 39.1 KG/M2 | HEART RATE: 91 BPM | RESPIRATION RATE: 18 BRPM | HEIGHT: 68 IN | DIASTOLIC BLOOD PRESSURE: 91 MMHG | TEMPERATURE: 97.9 F

## 2024-12-04 DIAGNOSIS — M54.50 ACUTE EXACERBATION OF CHRONIC LOW BACK PAIN: Primary | ICD-10-CM

## 2024-12-04 DIAGNOSIS — G89.29 ACUTE EXACERBATION OF CHRONIC LOW BACK PAIN: Primary | ICD-10-CM

## 2024-12-04 PROCEDURE — 99283 EMERGENCY DEPT VISIT LOW MDM: CPT

## 2024-12-04 RX ORDER — OXYCODONE AND ACETAMINOPHEN 5; 325 MG/1; MG/1
1 TABLET ORAL EVERY 8 HOURS PRN
Qty: 12 TABLET | Refills: 0 | Status: SHIPPED | OUTPATIENT
Start: 2024-12-04 | End: 2024-12-09

## 2024-12-04 ASSESSMENT — PAIN - FUNCTIONAL ASSESSMENT: PAIN_FUNCTIONAL_ASSESSMENT: 0-10

## 2024-12-04 ASSESSMENT — PAIN SCALES - GENERAL: PAINLEVEL_OUTOF10: 10

## 2024-12-04 ASSESSMENT — ENCOUNTER SYMPTOMS: BACK PAIN: 1

## 2024-12-04 ASSESSMENT — PAIN DESCRIPTION - LOCATION: LOCATION: KNEE

## 2024-12-04 ASSESSMENT — PAIN DESCRIPTION - ORIENTATION: ORIENTATION: RIGHT;LEFT

## 2024-12-04 NOTE — DISCHARGE INSTRUCTIONS
You were previously on 4 tablets of Percocet per day.  It is important that you start trying to wean down on this medication.  This means you need to start taking less daily.  I recommend starting off with 3 times a day at most.  If you are able to use less this is preferred.  It is very important to follow-up with pain management.  Their options for managing this pain may be very beneficial even if narcotics are not the preference.  Physical therapy and injections were recommended at your last visit.    Primary care follow-up is also indicated.  Please call [phone] for PCP follow-up information.

## 2024-12-04 NOTE — ED NOTES
Dr. Goldberger at bedside to devon pt.  Pt reports chronic bilat knee and lower back pain for which she previously saw her PCP and was prescribed Percocet for an extended period of time. Pt reports she was referred to pain management and was recommended to have injections - pt unsure of when her next appt with pain management is. States her PCP left the practice they were in and did not leave her in the care of another physician - states \"that's why I'm here\". Pt requesting pain medication despite pain management recommendation for pt to no longer be on Percocet.

## 2024-12-04 NOTE — ED PROVIDER NOTES
EMERGENCY DEPARTMENT ENCOUNTER    Pt Name: Sabrina Johnson  MRN: [id number]  Birthdate 1960  Date of evaluation: 12/4/24  CHIEF COMPLAINT       Chief Complaint   Patient presents with    Back Pain    Knee Pain     Pt reports mid lower back pain and bilat. knee pain. Worsening over the past few years. Denies recent injury. States she was recently seen here and was given 2 shots and sent home on pain meds. Reports out of pain med.    Hypertension     192/99 states elevated due to pain.     HISTORY OF PRESENT ILLNESS   64-year-old female presents emergency room with continued issues with back pain and knee pain bilaterally.  Patient has spinal stenosis and osteoarthritis in her knees.  She has dealt with pain in these areas for a long time.  She was recently sent to pain management by her primary care doctor.  Pain management note remarks on her opioids not likely being beneficial to her given severity of pain despite taking them.  They recommend discontinuing.  Patient however has been dependent on these medications for a number of years.  Her primary care doctor is no longer practicing and she has not been able to get any pain meds.  She reports pain is out of control.             REVIEW OF SYSTEMS     Review of Systems   Musculoskeletal:  Positive for arthralgias and back pain.     PASTMEDICAL HISTORY     Past Medical History:   Diagnosis Date    Back pain     Sciatica      Past Problem List  Patient Active Problem List   Diagnosis Code    History of total hysterectomy with bilateral salpingo-oophorectomy (BSO) Z90.710, Z90.722, Z90.79    Osteoarthritis M19.90    Essential (primary) hypertension I10    Chronic headaches R51.9, G89.29    Lumbar stenosis with neurogenic claudication M48.062    Class 3 severe obesity due to excess calories with serious comorbidity and body mass index (BMI) of 40.0 to 44.9 in adult E66.813, E66.01, Z68.41    Colon polyps K63.5    Major depressive disorder, recurrent episode, severe

## 2025-06-06 ENCOUNTER — HOSPITAL ENCOUNTER (EMERGENCY)
Age: 65
Discharge: HOME | End: 2025-06-06
Attending: EMERGENCY MEDICINE
Payer: MEDICAID

## 2025-06-06 VITALS
SYSTOLIC BLOOD PRESSURE: 180 MMHG | HEART RATE: 89 BPM | RESPIRATION RATE: 18 BRPM | BODY MASS INDEX: 34.1 KG/M2 | WEIGHT: 225 LBS | HEIGHT: 68 IN | DIASTOLIC BLOOD PRESSURE: 90 MMHG | TEMPERATURE: 98.2 F | OXYGEN SATURATION: 98 %

## 2025-06-06 DIAGNOSIS — M54.50 CHRONIC BILATERAL LOW BACK PAIN WITHOUT SCIATICA: Primary | ICD-10-CM

## 2025-06-06 DIAGNOSIS — G89.29 CHRONIC BILATERAL LOW BACK PAIN WITHOUT SCIATICA: Primary | ICD-10-CM

## 2025-06-06 PROCEDURE — 96372 THER/PROPH/DIAG INJ SC/IM: CPT

## 2025-06-06 PROCEDURE — 99284 EMERGENCY DEPT VISIT MOD MDM: CPT

## 2025-06-06 PROCEDURE — 6370000000 HC RX 637 (ALT 250 FOR IP): Performed by: EMERGENCY MEDICINE

## 2025-06-06 PROCEDURE — 6360000002 HC RX W HCPCS: Performed by: EMERGENCY MEDICINE

## 2025-06-06 RX ORDER — DEXAMETHASONE SODIUM PHOSPHATE 10 MG/ML
10 INJECTION, SOLUTION INTRAMUSCULAR; INTRAVENOUS ONCE
Status: COMPLETED | OUTPATIENT
Start: 2025-06-06 | End: 2025-06-06

## 2025-06-06 RX ORDER — LIDOCAINE 4 G/G
1 PATCH TOPICAL ONCE
Status: DISCONTINUED | OUTPATIENT
Start: 2025-06-06 | End: 2025-06-06 | Stop reason: HOSPADM

## 2025-06-06 RX ORDER — IBUPROFEN 800 MG/1
800 TABLET, FILM COATED ORAL EVERY 8 HOURS PRN
Qty: 30 TABLET | Refills: 1 | Status: SHIPPED | OUTPATIENT
Start: 2025-06-06 | End: 2025-06-10 | Stop reason: SDUPTHER

## 2025-06-06 RX ORDER — KETOROLAC TROMETHAMINE 15 MG/ML
15 INJECTION, SOLUTION INTRAMUSCULAR; INTRAVENOUS ONCE
Status: COMPLETED | OUTPATIENT
Start: 2025-06-06 | End: 2025-06-06

## 2025-06-06 RX ORDER — ORPHENADRINE CITRATE 30 MG/ML
60 INJECTION INTRAMUSCULAR; INTRAVENOUS ONCE
Status: COMPLETED | OUTPATIENT
Start: 2025-06-06 | End: 2025-06-06

## 2025-06-06 RX ORDER — CYCLOBENZAPRINE HCL 10 MG
10 TABLET ORAL 3 TIMES DAILY PRN
Qty: 21 TABLET | Refills: 0 | Status: SHIPPED | OUTPATIENT
Start: 2025-06-06 | End: 2025-06-16

## 2025-06-06 RX ADMIN — ORPHENADRINE CITRATE 60 MG: 30 INJECTION, SOLUTION INTRAMUSCULAR; INTRAVENOUS at 09:14

## 2025-06-06 RX ADMIN — KETOROLAC TROMETHAMINE 15 MG: 15 INJECTION, SOLUTION INTRAMUSCULAR; INTRAVENOUS at 09:13

## 2025-06-06 RX ADMIN — DEXAMETHASONE SODIUM PHOSPHATE 10 MG: 10 INJECTION INTRAMUSCULAR; INTRAVENOUS at 09:14

## 2025-06-06 ASSESSMENT — PAIN - FUNCTIONAL ASSESSMENT
PAIN_FUNCTIONAL_ASSESSMENT: INTOLERABLE, UNABLE TO DO ANY ACTIVE OR PASSIVE ACTIVITIES
PAIN_FUNCTIONAL_ASSESSMENT: 0-10

## 2025-06-06 ASSESSMENT — PAIN DESCRIPTION - PAIN TYPE: TYPE: CHRONIC PAIN

## 2025-06-06 ASSESSMENT — PAIN SCALES - GENERAL
PAINLEVEL_OUTOF10: 10
PAINLEVEL_OUTOF10: 10

## 2025-06-06 ASSESSMENT — PAIN DESCRIPTION - DESCRIPTORS: DESCRIPTORS: ACHING;POUNDING

## 2025-06-06 ASSESSMENT — PAIN DESCRIPTION - LOCATION
LOCATION: BACK
LOCATION: BACK

## 2025-06-06 ASSESSMENT — PAIN DESCRIPTION - ORIENTATION: ORIENTATION: LOWER

## 2025-06-06 NOTE — ED PROVIDER NOTES
EMERGENCY DEPARTMENT ENCOUNTER    Pt Name: Sabrina Johnson  MRN: [id number]  Birthdate 1960  Date of evaluation: 6/6/25  CHIEF COMPLAINT       Chief Complaint   Patient presents with    Back Pain     Pounding in lower back and radiating into both hips     HISTORY OF PRESENT ILLNESS   64-year-old female presenting to the emergency department today for evaluation of flareup of her chronic back pain.  Reports she sustained injury to her lower back several years ago and describes her pain started since then.  Denies any new changes to her chronic back pain.  States that she has not tried anything for pain alleviation prior to ED arrival.  She does not have any numbness, tingling, weakness, saddle anesthesia, urinary or bowel incontinence.  She denies any new trauma or injuries.  She denies any other systemic signs or symptoms.             REVIEW OF SYSTEMS     Review of Systems   Musculoskeletal:  Positive for back pain.   All other systems reviewed and are negative.    PASTMEDICAL HISTORY     Past Medical History:   Diagnosis Date    Back pain     Sciatica      Past Problem List  Patient Active Problem List   Diagnosis Code    History of total hysterectomy with bilateral salpingo-oophorectomy (BSO) Z90.710, Z90.722, Z90.79    Osteoarthritis M19.90    Essential (primary) hypertension I10    Chronic headaches R51.9, G89.29    Lumbar stenosis with neurogenic claudication M48.062    Class 3 severe obesity due to excess calories with serious comorbidity and body mass index (BMI) of 40.0 to 44.9 in adult ([facility]) E66.813, Z68.41    Colon polyps K63.5    Major depressive disorder, recurrent episode, severe with mixed features ([facility]) F33.2    Fibromyalgia M79.7    Chronic vertigo R42    Primary osteoarthritis involving multiple joints M15.0    DDD (degenerative disc disease), lumbar M51.369    Gastroesophageal reflux disease K21.9    Vitamin B12 deficiency E53.8    Diverticulitis K57.92    Opioid dependence with current use    cyclobenzaprine (FLEXERIL) 10 MG tablet Take 1 tablet by mouth 3 times daily as needed for Muscle spasms, Disp-21 tablet, R-0Normal           PHYSICIAN CONSULTS ORDERED THIS ENCOUNTER:  None  FINAL IMPRESSION      1. Chronic bilateral low back pain without sciatica          DISPOSITION/PLAN   DISPOSITION       Discharge           OUTPATIENT FOLLOW UP THE PATIENT:  [facility] Emergency Department  [address] [address]  [phone]  Go to   If symptoms worsen      Noah Hickman MD        This note was created with the assistance of a speech-recognition program. While intending to generate a document that actually reflects the content of the visit, no guarantees can be provided that every mistake has been identified and corrected by editing.       Noah Hickman MD  06/08/25 [id number]

## 2025-06-06 NOTE — DISCHARGE INSTRUCTIONS
You may take medications as prescribed as needed.  Follow-up with your primary care physician.  Return back to the emergency department immediately if you notice any concerning or worsening signs or symptoms.

## 2025-06-06 NOTE — ED NOTES
Patient arrived to ED c/o lower back pain and knee pain. Pain is chronic and she has experienced it for a few years. Pt diagnosed with sciatica, and spinal stenosis. She also states that she does not take anything at home or follow up with pain management.

## 2025-06-10 ENCOUNTER — TELEPHONE (OUTPATIENT)
Dept: FAMILY MEDICINE CLINIC | Age: 65
End: 2025-06-10

## 2025-06-10 ENCOUNTER — OFFICE VISIT (OUTPATIENT)
Dept: FAMILY MEDICINE CLINIC | Age: 65
End: 2025-06-10

## 2025-06-10 VITALS
RESPIRATION RATE: 18 BRPM | OXYGEN SATURATION: 99 % | HEART RATE: 110 BPM | SYSTOLIC BLOOD PRESSURE: 158 MMHG | DIASTOLIC BLOOD PRESSURE: 92 MMHG | WEIGHT: 224.6 LBS | HEIGHT: 67 IN | BODY MASS INDEX: 35.25 KG/M2 | TEMPERATURE: 97 F

## 2025-06-10 DIAGNOSIS — G89.29 CHRONIC BILATERAL LOW BACK PAIN WITH BILATERAL SCIATICA: ICD-10-CM

## 2025-06-10 DIAGNOSIS — E53.8 VITAMIN B 12 DEFICIENCY: ICD-10-CM

## 2025-06-10 DIAGNOSIS — Z79.4 TYPE 2 DIABETES MELLITUS WITH HYPERGLYCEMIA, WITH LONG-TERM CURRENT USE OF INSULIN (HCC): ICD-10-CM

## 2025-06-10 DIAGNOSIS — Z12.31 ENCOUNTER FOR SCREENING MAMMOGRAM FOR MALIGNANT NEOPLASM OF BREAST: ICD-10-CM

## 2025-06-10 DIAGNOSIS — M54.41 CHRONIC BILATERAL LOW BACK PAIN WITH BILATERAL SCIATICA: ICD-10-CM

## 2025-06-10 DIAGNOSIS — R60.0 BILATERAL LOWER EXTREMITY EDEMA: ICD-10-CM

## 2025-06-10 DIAGNOSIS — E11.65 TYPE 2 DIABETES MELLITUS WITH HYPERGLYCEMIA, WITH LONG-TERM CURRENT USE OF INSULIN (HCC): ICD-10-CM

## 2025-06-10 DIAGNOSIS — K21.9 GASTROESOPHAGEAL REFLUX DISEASE, UNSPECIFIED WHETHER ESOPHAGITIS PRESENT: ICD-10-CM

## 2025-06-10 DIAGNOSIS — M54.42 CHRONIC BILATERAL LOW BACK PAIN WITH BILATERAL SCIATICA: ICD-10-CM

## 2025-06-10 DIAGNOSIS — I10 ESSENTIAL (PRIMARY) HYPERTENSION: ICD-10-CM

## 2025-06-10 DIAGNOSIS — Z76.89 ENCOUNTER TO ESTABLISH CARE: Primary | ICD-10-CM

## 2025-06-10 DIAGNOSIS — J30.2 SEASONAL ALLERGIC RHINITIS, UNSPECIFIED TRIGGER: ICD-10-CM

## 2025-06-10 PROBLEM — E27.8 RIGHT ADRENAL MASS: Status: ACTIVE | Noted: 2025-02-07

## 2025-06-10 PROBLEM — E11.42 TYPE 2 DIABETES MELLITUS WITH DIABETIC POLYNEUROPATHY (HCC): Status: ACTIVE | Noted: 2025-06-10

## 2025-06-10 PROBLEM — F17.210 NICOTINE DEPENDENCE, CIGARETTES, UNCOMPLICATED: Status: ACTIVE | Noted: 2025-06-10

## 2025-06-10 PROBLEM — M17.0 BILATERAL PRIMARY OSTEOARTHRITIS OF KNEE: Status: ACTIVE | Noted: 2025-06-10

## 2025-06-10 PROBLEM — K57.92 ACUTE DIVERTICULITIS: Status: ACTIVE | Noted: 2025-02-07

## 2025-06-10 LAB — HBA1C MFR BLD: 6.3 %

## 2025-06-10 RX ORDER — MAGNESIUM OXIDE 400 MG/1
400 TABLET ORAL DAILY
Qty: 30 TABLET | Refills: 2 | Status: SHIPPED | OUTPATIENT
Start: 2025-06-10 | End: 2025-09-08

## 2025-06-10 RX ORDER — PREGABALIN 100 MG/1
100 CAPSULE ORAL 3 TIMES DAILY
Qty: 90 CAPSULE | Refills: 2 | Status: CANCELLED | OUTPATIENT
Start: 2025-06-10 | End: 2025-09-08

## 2025-06-10 RX ORDER — IBUPROFEN 800 MG/1
800 TABLET, FILM COATED ORAL EVERY 8 HOURS PRN
Qty: 30 TABLET | Refills: 1 | Status: SHIPPED | OUTPATIENT
Start: 2025-06-10

## 2025-06-10 RX ORDER — KETOROLAC TROMETHAMINE 30 MG/ML
30 INJECTION, SOLUTION INTRAMUSCULAR; INTRAVENOUS ONCE
Status: COMPLETED | OUTPATIENT
Start: 2025-06-10 | End: 2025-06-10

## 2025-06-10 RX ORDER — INSULIN GLARGINE 100 [IU]/ML
20 INJECTION, SOLUTION SUBCUTANEOUS 2 TIMES DAILY
Qty: 15 ML | Refills: 5 | Status: SHIPPED | OUTPATIENT
Start: 2025-06-10

## 2025-06-10 RX ORDER — FLUTICASONE PROPIONATE 50 MCG
SPRAY, SUSPENSION (ML) NASAL
Qty: 3 EACH | Refills: 1 | Status: SHIPPED | OUTPATIENT
Start: 2025-06-10

## 2025-06-10 RX ORDER — METOPROLOL TARTRATE 50 MG
50 TABLET ORAL 2 TIMES DAILY
Qty: 90 TABLET | Refills: 3 | Status: SHIPPED | OUTPATIENT
Start: 2025-06-10

## 2025-06-10 RX ORDER — LISINOPRIL AND HYDROCHLOROTHIAZIDE 12.5; 2 MG/1; MG/1
2 TABLET ORAL DAILY
Qty: 180 TABLET | Refills: 1 | Status: SHIPPED | OUTPATIENT
Start: 2025-06-10

## 2025-06-10 RX ORDER — OMEPRAZOLE 40 MG/1
40 CAPSULE, DELAYED RELEASE ORAL
Qty: 90 CAPSULE | Refills: 1 | Status: SHIPPED | OUTPATIENT
Start: 2025-06-10

## 2025-06-10 RX ORDER — CYCLOBENZAPRINE HCL 10 MG
10 TABLET ORAL 3 TIMES DAILY PRN
Qty: 21 TABLET | Refills: 0 | Status: CANCELLED | OUTPATIENT
Start: 2025-06-10 | End: 2025-06-20

## 2025-06-10 RX ORDER — INSULIN LISPRO 100 [IU]/ML
5 INJECTION, SOLUTION INTRAVENOUS; SUBCUTANEOUS
Qty: 3 ML | Refills: 4 | Status: SHIPPED | OUTPATIENT
Start: 2025-06-10

## 2025-06-10 RX ORDER — POTASSIUM CHLORIDE 1500 MG/1
20 TABLET, EXTENDED RELEASE ORAL DAILY
Qty: 90 TABLET | Refills: 1 | Status: SHIPPED | OUTPATIENT
Start: 2025-06-10 | End: 2025-12-07

## 2025-06-10 RX ORDER — CELECOXIB 200 MG/1
200 CAPSULE ORAL 2 TIMES DAILY
Qty: 60 CAPSULE | Refills: 1 | Status: SHIPPED | OUTPATIENT
Start: 2025-06-10

## 2025-06-10 RX ADMIN — KETOROLAC TROMETHAMINE 30 MG: 30 INJECTION, SOLUTION INTRAMUSCULAR; INTRAVENOUS at 15:34

## 2025-06-10 SDOH — ECONOMIC STABILITY: FOOD INSECURITY: WITHIN THE PAST 12 MONTHS, THE FOOD YOU BOUGHT JUST DIDN'T LAST AND YOU DIDN'T HAVE MONEY TO GET MORE.: NEVER TRUE

## 2025-06-10 SDOH — ECONOMIC STABILITY: FOOD INSECURITY: WITHIN THE PAST 12 MONTHS, YOU WORRIED THAT YOUR FOOD WOULD RUN OUT BEFORE YOU GOT MONEY TO BUY MORE.: NEVER TRUE

## 2025-06-10 ASSESSMENT — PATIENT HEALTH QUESTIONNAIRE - PHQ9
SUM OF ALL RESPONSES TO PHQ QUESTIONS 1-9: 0
6. FEELING BAD ABOUT YOURSELF - OR THAT YOU ARE A FAILURE OR HAVE LET YOURSELF OR YOUR FAMILY DOWN: NOT AT ALL
7. TROUBLE CONCENTRATING ON THINGS, SUCH AS READING THE NEWSPAPER OR WATCHING TELEVISION: NOT AT ALL
SUM OF ALL RESPONSES TO PHQ QUESTIONS 1-9: 0
9. THOUGHTS THAT YOU WOULD BE BETTER OFF DEAD, OR OF HURTING YOURSELF: NOT AT ALL
8. MOVING OR SPEAKING SO SLOWLY THAT OTHER PEOPLE COULD HAVE NOTICED. OR THE OPPOSITE, BEING SO FIGETY OR RESTLESS THAT YOU HAVE BEEN MOVING AROUND A LOT MORE THAN USUAL: NOT AT ALL
3. TROUBLE FALLING OR STAYING ASLEEP: NOT AT ALL
SUM OF ALL RESPONSES TO PHQ QUESTIONS 1-9: 0
SUM OF ALL RESPONSES TO PHQ QUESTIONS 1-9: 0
5. POOR APPETITE OR OVEREATING: NOT AT ALL
1. LITTLE INTEREST OR PLEASURE IN DOING THINGS: NOT AT ALL
4. FEELING TIRED OR HAVING LITTLE ENERGY: NOT AT ALL
2. FEELING DOWN, DEPRESSED OR HOPELESS: NOT AT ALL
10. IF YOU CHECKED OFF ANY PROBLEMS, HOW DIFFICULT HAVE THESE PROBLEMS MADE IT FOR YOU TO DO YOUR WORK, TAKE CARE OF THINGS AT HOME, OR GET ALONG WITH OTHER PEOPLE: NOT DIFFICULT AT ALL

## 2025-06-10 ASSESSMENT — ENCOUNTER SYMPTOMS
ABDOMINAL PAIN: 0
CHEST TIGHTNESS: 0
EYE PAIN: 0
EYES NEGATIVE: 1
GASTROINTESTINAL NEGATIVE: 1
SHORTNESS OF BREATH: 0
ALLERGIC/IMMUNOLOGIC NEGATIVE: 1
RESPIRATORY NEGATIVE: 1

## 2025-06-10 NOTE — PROGRESS NOTES
Housing in the Last Year: No     Number of Times Moved in the Last Year: 0     Homeless in the Last Year: No        History reviewed. No pertinent family history.     PHYSICAL EXAM:     BP (!) 158/92 (BP Site: Left Upper Arm, Patient Position: Sitting)   Pulse (!) 110   Temp 97 °F (36.1 °C) (Temporal)   Resp 18   Ht 1.702 m (5' 7\")   Wt 101.9 kg (224 lb 9.6 oz)   SpO2 99%   BMI 35.18 kg/m²    Physical Exam  Vitals reviewed.   Constitutional:       Appearance: Normal appearance.   HENT:      Head: Normocephalic.      Nose: Nose normal.   Eyes:      Pupils: Pupils are equal, round, and reactive to light.   Cardiovascular:      Rate and Rhythm: Normal rate and regular rhythm.   Pulmonary:      Effort: Pulmonary effort is normal.      Breath sounds: Normal breath sounds.   Abdominal:      General: Bowel sounds are normal.   Musculoskeletal:         General: Normal range of motion.      Cervical back: Normal range of motion and neck supple.   Skin:     General: Skin is warm and dry.   Neurological:      General: No focal deficit present.      Mental Status: She is alert. Mental status is at baseline.   Psychiatric:         Mood and Affect: Mood normal.         Behavior: Behavior normal.         Thought Content: Thought content normal.         ASSESSMENT/PLAN:     1. Encounter to establish care  - History reviewed and updated.     2. Type 2 diabetes mellitus with hyperglycemia, with long-term current use of insulin ([facility])  -     POCT glycosylated hemoglobin (Hb A1C)  -     insulin lispro, 1 Unit Dial, (HUMALOG/ADMELOG) 100 UNIT/ML SOPN; Inject 5 Units into the skin 3 times daily (before meals), Disp-3 mL, R-4Normal  -     insulin glargine (LANTUS SOLOSTAR) 100 UNIT/ML injection pen; Inject 20 Units into the skin 2 times daily, Disp-15 mL, R-5Normal    - Education provided on improving dietary efforts to maintain A1c.  Continue taking insulin as prescribed and monitoring blood sugars at home.     3. Encounter for

## 2025-06-11 ENCOUNTER — TELEPHONE (OUTPATIENT)
Dept: FAMILY MEDICINE CLINIC | Age: 65
End: 2025-06-11

## 2025-06-11 PROBLEM — E11.42 TYPE 2 DIABETES MELLITUS WITH DIABETIC POLYNEUROPATHY (HCC): Status: RESOLVED | Noted: 2025-06-10 | Resolved: 2025-06-11

## 2025-06-11 PROBLEM — E11.42 TYPE 2 DIABETES MELLITUS WITH DIABETIC POLYNEUROPATHY, WITH LONG-TERM CURRENT USE OF INSULIN (HCC): Status: ACTIVE | Noted: 2024-09-09

## 2025-06-11 NOTE — TELEPHONE ENCOUNTER
Patient called and stated she was referred to Pain management but they will not see her without her previous records but she was seen in [place], [place] by Dr Patel and cannot drive there to get those records. She is wondering if she should get a different referral.

## 2025-06-11 NOTE — TELEPHONE ENCOUNTER
Discussed records with Matilda NOLAN and patient is advised to come to our office to sign for the release of records and we can fax it to patients old PM doctor.

## 2025-06-11 NOTE — TELEPHONE ENCOUNTER
Patient called and I told her that she should have some left. She said that she takes more than three a day and only has two pills left. I informed her that Matilda would not be sending in any additional medication at this time. Patient verbally understood.

## 2025-06-16 ENCOUNTER — TELEPHONE (OUTPATIENT)
Dept: FAMILY MEDICINE CLINIC | Age: 65
End: 2025-06-16

## 2025-06-16 DIAGNOSIS — R25.2 LEG CRAMPS: ICD-10-CM

## 2025-06-16 DIAGNOSIS — M17.0 BILATERAL PRIMARY OSTEOARTHRITIS OF KNEE: Primary | ICD-10-CM

## 2025-06-16 DIAGNOSIS — M48.062 LUMBAR STENOSIS WITH NEUROGENIC CLAUDICATION: ICD-10-CM

## 2025-06-16 RX ORDER — CYCLOBENZAPRINE HCL 10 MG
10 TABLET ORAL 3 TIMES DAILY PRN
Qty: 21 TABLET | Refills: 0 | Status: SHIPPED | OUTPATIENT
Start: 2025-06-16 | End: 2025-06-26

## 2025-06-16 NOTE — TELEPHONE ENCOUNTER
Pt advised she said ok  faxed Referral and Demo sheet and last Office note to [facility]. [phone]

## 2025-06-16 NOTE — TELEPHONE ENCOUNTER
Patient was prescribed Flexeril on 6/6/25 at ER for 1 tablet 3 times a day with a quantity of 21 tablet. Patient is asking if you will prescribe her the medication for cramps in her back, knees and joints. Patient uses [facility] on [facility].

## 2025-07-17 DIAGNOSIS — E11.9 TYPE 2 DIABETES MELLITUS WITHOUT COMPLICATION, WITH LONG-TERM CURRENT USE OF INSULIN (HCC): ICD-10-CM

## 2025-07-17 DIAGNOSIS — E66.01 MORBID OBESITY DUE TO EXCESS CALORIES (HCC): ICD-10-CM

## 2025-07-17 DIAGNOSIS — Z79.4 TYPE 2 DIABETES MELLITUS WITHOUT COMPLICATION, WITH LONG-TERM CURRENT USE OF INSULIN (HCC): ICD-10-CM

## 2025-07-17 RX ORDER — GLUCOSAMINE HCL/CHONDROITIN SU 500-400 MG
CAPSULE ORAL
Qty: 150 STRIP | Refills: 1 | Status: CANCELLED | OUTPATIENT
Start: 2025-07-17

## 2025-07-17 NOTE — TELEPHONE ENCOUNTER
Sabrina Johnson is calling to request a refill on the following medication(s):    Medication Request:  Requested Prescriptions     Pending Prescriptions Disp Refills    blood glucose monitor strips 150 strip 1     Sig: Test 4  times a day as needed for symptoms of irregular blood glucose. Has true metrix meter.       Last Visit Date (If Applicable):  6/10/2025    Next Visit Date:    9/11/2025      Prescription pending.

## 2025-08-15 ENCOUNTER — TRANSCRIBE ORDERS (OUTPATIENT)
Dept: ADMINISTRATIVE | Age: 65
End: 2025-08-15

## 2025-08-15 DIAGNOSIS — M79.7 FIBROMYALGIA: Primary | ICD-10-CM

## 2025-08-27 ENCOUNTER — TELEPHONE (OUTPATIENT)
Dept: GASTROENTEROLOGY | Age: 65
End: 2025-08-27